# Patient Record
Sex: FEMALE | Race: WHITE | NOT HISPANIC OR LATINO | Employment: OTHER | URBAN - METROPOLITAN AREA
[De-identification: names, ages, dates, MRNs, and addresses within clinical notes are randomized per-mention and may not be internally consistent; named-entity substitution may affect disease eponyms.]

---

## 2017-01-12 ENCOUNTER — OFFICE VISIT (OUTPATIENT)
Dept: CARDIOLOGY | Facility: CLINIC | Age: 72
End: 2017-01-12
Payer: MEDICARE

## 2017-01-12 VITALS
DIASTOLIC BLOOD PRESSURE: 81 MMHG | HEIGHT: 66 IN | BODY MASS INDEX: 34.22 KG/M2 | HEART RATE: 83 BPM | SYSTOLIC BLOOD PRESSURE: 152 MMHG | WEIGHT: 212.94 LBS

## 2017-01-12 DIAGNOSIS — Z78.9 STATIN INTOLERANCE: ICD-10-CM

## 2017-01-12 DIAGNOSIS — E78.5 DYSLIPIDEMIA: ICD-10-CM

## 2017-01-12 DIAGNOSIS — Z95.5 S/P CORONARY ARTERY STENT PLACEMENT: Chronic | ICD-10-CM

## 2017-01-12 DIAGNOSIS — I10 ESSENTIAL HYPERTENSION: ICD-10-CM

## 2017-01-12 DIAGNOSIS — I25.10 CORONARY ARTERY DISEASE INVOLVING NATIVE CORONARY ARTERY OF NATIVE HEART WITHOUT ANGINA PECTORIS: Chronic | ICD-10-CM

## 2017-01-12 DIAGNOSIS — Z95.1 S/P CABG (CORONARY ARTERY BYPASS GRAFT): Primary | ICD-10-CM

## 2017-01-12 DIAGNOSIS — E11.9 TYPE 2 DIABETES MELLITUS WITHOUT COMPLICATION, WITHOUT LONG-TERM CURRENT USE OF INSULIN: ICD-10-CM

## 2017-01-12 DIAGNOSIS — R94.39 ABNORMAL THALLIUM STRESS TEST: ICD-10-CM

## 2017-01-12 PROCEDURE — 99999 PR PBB SHADOW E&M-EST. PATIENT-LVL III: CPT | Mod: PBBFAC,,, | Performed by: INTERNAL MEDICINE

## 2017-01-12 PROCEDURE — 3046F HEMOGLOBIN A1C LEVEL >9.0%: CPT | Mod: S$GLB,,, | Performed by: INTERNAL MEDICINE

## 2017-01-12 PROCEDURE — 3079F DIAST BP 80-89 MM HG: CPT | Mod: S$GLB,,, | Performed by: INTERNAL MEDICINE

## 2017-01-12 PROCEDURE — 3077F SYST BP >= 140 MM HG: CPT | Mod: S$GLB,,, | Performed by: INTERNAL MEDICINE

## 2017-01-12 PROCEDURE — 99214 OFFICE O/P EST MOD 30 MIN: CPT | Mod: S$GLB,,, | Performed by: INTERNAL MEDICINE

## 2017-01-12 PROCEDURE — 1157F ADVNC CARE PLAN IN RCRD: CPT | Mod: S$GLB,,, | Performed by: INTERNAL MEDICINE

## 2017-01-12 PROCEDURE — 2022F DILAT RTA XM EVC RTNOPTHY: CPT | Mod: S$GLB,,, | Performed by: INTERNAL MEDICINE

## 2017-01-12 PROCEDURE — 1159F MED LIST DOCD IN RCRD: CPT | Mod: S$GLB,,, | Performed by: INTERNAL MEDICINE

## 2017-01-12 PROCEDURE — 1126F AMNT PAIN NOTED NONE PRSNT: CPT | Mod: S$GLB,,, | Performed by: INTERNAL MEDICINE

## 2017-01-12 PROCEDURE — 1160F RVW MEDS BY RX/DR IN RCRD: CPT | Mod: S$GLB,,, | Performed by: INTERNAL MEDICINE

## 2017-01-12 PROCEDURE — 4010F ACE/ARB THERAPY RXD/TAKEN: CPT | Mod: S$GLB,,, | Performed by: INTERNAL MEDICINE

## 2017-01-12 RX ORDER — LOSARTAN POTASSIUM 25 MG/1
25 TABLET ORAL EVERY MORNING
Qty: 90 TABLET | Refills: 6 | Status: SHIPPED | OUTPATIENT
Start: 2017-01-12 | End: 2017-04-24 | Stop reason: SDUPTHER

## 2017-01-12 RX ORDER — CARVEDILOL 12.5 MG/1
12.5 TABLET ORAL 2 TIMES DAILY
Qty: 180 TABLET | Refills: 5 | Status: SHIPPED | OUTPATIENT
Start: 2017-01-12 | End: 2017-04-05

## 2017-01-12 RX ORDER — FENOFIBRATE 54 MG/1
54 TABLET ORAL NIGHTLY
Qty: 90 TABLET | Refills: 3 | Status: SHIPPED | OUTPATIENT
Start: 2017-01-12 | End: 2017-04-24

## 2017-01-12 NOTE — MR AVS SNAPSHOT
[place] [facility]  [address]  [facility] [address]  Phone: [phone]                  Mayra Pinedo   2017 3:00 PM   Office Visit    Description:  Female : 1945   Provider:  Jun Thompson MD   Department:  [place] [facility]           Reason for Visit     Follow-up     Hypertension     Coronary Artery Disease           Diagnoses this Visit        Comments    S/P CABG (coronary artery bypass graft)    -  Primary     S/P coronary artery stent placement         Essential hypertension         Dyslipidemia         Coronary artery disease involving native coronary artery of native heart without angina pectoris         Abnormal thallium stress test         S/P coronary artery stent placement     2015 D1- promus 2.5x16    Coronary artery disease involving native coronary artery of native heart without angina pectoris     2015 TUCKER [facility]  LM- 50%, LAD occluded mid. D1-90%, Cx/OM- mild dsx, RCA- occluded. VG- RCA patent LIMA- LAD ??50% at anastomosis VG- OM occluded    Aortic sclerosis         Type 2 diabetes mellitus without complication, without long-term current use of insulin                To Do List           Goals (5 Years of Data)     None      Follow-Up and Disposition     Return in about 10 months (around 2017).       These Medications        Disp Refills Start End    losartan (COZAAR) 25 MG tablet 90 tablet 6 2017     Take 1 tablet (25 mg total) by mouth every morning. - Oral    Pharmacy: [id number] - [place], [place]  [address] Ph #: [phone]       carvedilol (COREG) 12.5 MG tablet 180 tablet 5 2017    Take 1 tablet (12.5 mg total) by mouth 2 (two) times daily. - Oral    Pharmacy: [id number] - [place], [place] - [address] Ph #: [phone]       fenofibrate (TRICOR) 54 MG tablet 90 tablet 3 2017    Take 1 tablet (54 mg total) by mouth nightly. - Oral    Pharmacy: [facility]  [place] - [address]  #: [phone]         [facility]     [facility] - 24/7 Assistance  Registered nurses in the [facility] provide clinical advisement, health education, appointment booking, and other advisory services.  Call for this free service at [phone].             Medications           Message regarding Medications     Verify the changes and/or additions to your medication regime listed below are the same as discussed with your clinician today.  If any of these changes or additions are incorrect, please notify your healthcare provider.        START taking these NEW medications        Refills    fenofibrate (TRICOR) 54 MG tablet 3    Sig: Take 1 tablet (54 mg total) by mouth nightly.    Class: Normal    Route: Oral      CHANGE how you are taking these medications     Start Taking Instead of    losartan (COZAAR) 25 MG tablet losartan (COZAAR) 25 MG tablet    Dosage:  Take 1 tablet (25 mg total) by mouth every morning. Dosage:  Take 1 tablet (25 mg total) by mouth once daily.    Reason for Change:  Reorder     carvedilol (COREG) 12.5 MG tablet carvedilol (COREG) 6.25 MG tablet    Dosage:  Take 1 tablet (12.5 mg total) by mouth 2 (two) times daily. Dosage:  Take 1 tablet (6.25 mg total) by mouth 2 (two) times daily.    Reason for Change:  Reorder       STOP taking these medications     gemfibrozil (LOPID) 600 MG tablet Take 1 tablet (600 mg total) by mouth 2 (two) times daily before meals.           Verify that the below list of medications is an accurate representation of the medications you are currently taking.  If none reported, the list may be blank. If incorrect, please contact your healthcare provider. Carry this list with you in case of emergency.           Current Medications     ALBUTEROL SULFATE (VENTOLIN HFA INHL) Inhale 2 Inhalers into the lungs as needed.     aspirin (ECOTRIN) 81 MG EC tablet Take 81 mg by mouth once daily.   "    carvedilol (COREG) 12.5 MG tablet Take 1 tablet (12.5 mg total) by mouth 2 (two) times daily.    cetirizine (ZYRTEC) 10 MG tablet Take 1 tablet by mouth.    clopidogrel (PLAVIX) 75 mg tablet Take 1 tablet (75 mg total) by mouth once.    cyclobenzaprine (FLEXERIL) 10 MG tablet Take 10 mg by mouth 3 (three) times daily as needed for Muscle spasms.    isosorbide mononitrate (IMDUR) 60 MG 24 hr tablet Take 1 tablet (60 mg total) by mouth every evening.    losartan (COZAAR) 25 MG tablet Take 1 tablet (25 mg total) by mouth every morning.    meloxicam (MOBIC) 7.5 MG tablet Take 15 mg by mouth once daily.     metformin (GLUCOPHAGE-XR) 500 MG 24 hr tablet Take 1 tablet (500 mg total) by mouth once daily.    nitroGLYCERIN (NITROSTAT) 0.4 MG SL tablet Place 1 tablet (0.4 mg total) under the tongue every 5 (five) minutes as needed for Chest pain.    omega-3 fatty acids-vitamin E (FISH OIL) 1,000 mg Cap Take 2,400 mg by mouth 2 (two) times daily.     pantoprazole (PROTONIX) 40 MG tablet Take 40 mg by mouth once daily.    pitavastatin (LIVALO) 4 mg Tab Take 4 mg by mouth once daily.    fenofibrate (TRICOR) 54 MG tablet Take 1 tablet (54 mg total) by mouth nightly.           Clinical Reference Information           Vital Signs - Last Recorded  Most recent update: 1/12/2017  3:20 PM by Taylor Huffman    BP Pulse Ht Wt BMI    (!) 152/81 (BP Location: Left arm, Patient Position: Sitting, BP Method: Automatic) 83 5' 6" (1.676 m) 96.6 kg (212 lb 15.4 oz) 34.37 kg/m2      Blood Pressure          Most Recent Value    BP  (!)  152/81      Allergies as of 1/12/2017     Oxycodone-acetaminophen    Digitalis Glycosides    Statins-hmg-coa Reductase Inhibitors    Erythromycin    Latex, Natural Rubber    Penicillins    Sulfa (Sulfonamide Antibiotics)      Immunizations Administered on Date of Encounter - 1/12/2017     None      Orders Placed During Today's Visit     Future Labs/Procedures Expected by Expires    2D echo with color flow " doppler  11/12/2017 1/13/2018    CAR Ultrasound doppler carotid bliateral  11/12/2017 1/12/2018    CBC auto differential  11/12/2017 3/13/2018    CK  11/12/2017 1/13/2018    Comprehensive metabolic panel  11/12/2017 1/13/2018    Lipid panel  11/12/2017 1/13/2018

## 2017-01-12 NOTE — PROGRESS NOTES
Subjective:    Patient ID:  Mayra Pinedo is a 71 y.o. female who presents for follow-up of Follow-up (stent 07/08/2016); Hypertension; and Coronary Artery Disease      HPI   Here for follow up of CABG-PCI 7/16. No angina. 2 episodes of palpitation occurring while on vacation lasting < 4 hrs. None since then.       Review of Systems   Constitution: Negative for malaise/fatigue.   Eyes: Negative for blurred vision.   Cardiovascular: Negative for chest pain, claudication, cyanosis, dyspnea on exertion, irregular heartbeat, leg swelling, near-syncope, orthopnea, palpitations, paroxysmal nocturnal dyspnea and syncope.   Respiratory: Negative for cough and shortness of breath.    Hematologic/Lymphatic: Does not bruise/bleed easily.   Musculoskeletal: Negative for back pain, falls, joint pain, muscle cramps, muscle weakness and myalgias.   Gastrointestinal: Negative for abdominal pain, change in bowel habit, nausea and vomiting.   Genitourinary: Negative for urgency.   Neurological: Negative for dizziness, focal weakness and light-headedness.        Objective:    Physical Exam   Constitutional: She is oriented to person, place, and time. She appears well-developed and well-nourished.   HENT:   Head: Normocephalic.   Eyes: Conjunctivae are normal.   Neck: Normal range of motion. Neck supple. No JVD present.   Cardiovascular: Normal rate, regular rhythm, normal heart sounds and intact distal pulses.    Pulses:       Carotid pulses are 2+ on the right side, and 2+ on the left side.       Radial pulses are 2+ on the right side, and 2+ on the left side.        Dorsalis pedis pulses are 2+ on the right side, and 2+ on the left side.        Posterior tibial pulses are 2+ on the right side, and 2+ on the left side.   Well healed midline sternal incision.     Pulmonary/Chest: Effort normal and breath sounds normal.   Abdominal: Soft. Bowel sounds are normal.   Musculoskeletal: She exhibits no edema or tenderness.    Neurological: She is alert and oriented to person, place, and time. Gait normal.   Skin: Skin is warm, dry and intact. No cyanosis. Nails show no clubbing.   Psychiatric: She has a normal mood and affect. Her speech is normal and behavior is normal. Thought content normal.             ..    Chemistry        Component Value Date/Time     07/08/2016 0652    K 4.1 07/08/2016 0652     07/08/2016 0652    CO2 26 07/08/2016 0652    BUN 14 07/08/2016 0652    CREATININE 0.67 07/08/2016 0652     (H) 07/08/2016 0652        Component Value Date/Time    CALCIUM 9.4 07/08/2016 0652    ALKPHOS 57 06/21/2016 0843    AST 22 06/21/2016 0843    ALT 24 06/21/2016 0843    BILITOT 0.6 06/21/2016 0843            ..  Lab Results   Component Value Date    CHOL 134 06/21/2016     Lab Results   Component Value Date    HDL 34 (L) 06/21/2016     Lab Results   Component Value Date    LDLCALC 59.8 (L) 06/21/2016     Lab Results   Component Value Date    TRIG 201 (H) 06/21/2016     Lab Results   Component Value Date    CHOLHDL 25.4 06/21/2016     ..  Lab Results   Component Value Date    WBC 3.89 (L) 07/08/2016    HGB 12.3 07/08/2016    HCT 36.3 (L) 07/08/2016    MCV 93 07/08/2016     07/08/2016 12/2016   HDL 45 LDL 43  CMP- NL    Test(s) Reviewed  I have reviewed the following in detail:  [] Stress test   [] Angiography   [x] Echocardiogram   [x] Labs   [x] Other:       Assessment:       1. S/P CABG (coronary artery bypass graft) x 3 1991    2. S/P coronary artery stent placement    3. Essential hypertension    4. Dyslipidemia    5. Coronary artery disease involving native coronary artery of native heart without angina pectoris    6. Abnormal thallium stress test         Plan:       Return to clinic 8 months   Aerobic exercise 5x's/wk. Heart healthy diet and risk factor modification.    See labs and med orders.  INCREASE COREG

## 2017-02-20 ENCOUNTER — TELEPHONE (OUTPATIENT)
Dept: CARDIOLOGY | Facility: CLINIC | Age: 72
End: 2017-02-20

## 2017-02-20 NOTE — TELEPHONE ENCOUNTER
----- Message from Ashley Zaldivar sent at 2/20/2017  9:33 AM CST -----  Contact: self  Patient called stating she was seen in the ER at [facility] over the week end for AFIB   She was told to follow up with her cardio   Nothing showing until 4/16/17  Please call her at  her cell to schedule or advise.     Thanks

## 2017-03-02 ENCOUNTER — TELEPHONE (OUTPATIENT)
Dept: CARDIOLOGY | Facility: CLINIC | Age: 72
End: 2017-03-02

## 2017-03-02 ENCOUNTER — PATIENT MESSAGE (OUTPATIENT)
Dept: CARDIOLOGY | Facility: CLINIC | Age: 72
End: 2017-03-02

## 2017-03-02 NOTE — TELEPHONE ENCOUNTER
Dr Thompson,    email below from Patient. Do you want to re-adjust her Coreg dosage? Please advise    Ever since I have increased the Coreg after my SVT/A-Fib, and going to [facility],  I have been extremely dizzy when I wake up in the morning. It usually gets better as the day goes on, but a few times it has lasted all day. Earlier this week I tried cutting back to the 12.5 ml to see if that would help, it did for a couple of days. But today I am just as dizzy as when taking 25 ml.     Could the Feniofibrate make me dizzy, as that is new to my medications, too.? I take it as prescribed.     This is very uncomfortable to wake up that way, hope I don't fall, and when it stays with me I feel so limited in what I am able to do.

## 2017-04-05 ENCOUNTER — OFFICE VISIT (OUTPATIENT)
Dept: CARDIOLOGY | Facility: CLINIC | Age: 72
End: 2017-04-05
Payer: MEDICARE

## 2017-04-05 ENCOUNTER — TELEPHONE (OUTPATIENT)
Dept: CARDIOLOGY | Facility: CLINIC | Age: 72
End: 2017-04-05

## 2017-04-05 ENCOUNTER — TELEPHONE (OUTPATIENT)
Dept: ELECTROPHYSIOLOGY | Facility: CLINIC | Age: 72
End: 2017-04-05

## 2017-04-05 VITALS
SYSTOLIC BLOOD PRESSURE: 134 MMHG | DIASTOLIC BLOOD PRESSURE: 72 MMHG | HEIGHT: 66 IN | WEIGHT: 212.94 LBS | BODY MASS INDEX: 34.22 KG/M2 | HEART RATE: 66 BPM

## 2017-04-05 DIAGNOSIS — I25.10 CORONARY ARTERY DISEASE INVOLVING NATIVE CORONARY ARTERY OF NATIVE HEART WITHOUT ANGINA PECTORIS: Chronic | ICD-10-CM

## 2017-04-05 DIAGNOSIS — Z95.5 S/P CORONARY ARTERY STENT PLACEMENT: Primary | Chronic | ICD-10-CM

## 2017-04-05 DIAGNOSIS — I10 ESSENTIAL HYPERTENSION: ICD-10-CM

## 2017-04-05 DIAGNOSIS — Z95.1 S/P CABG (CORONARY ARTERY BYPASS GRAFT): ICD-10-CM

## 2017-04-05 DIAGNOSIS — E78.5 DYSLIPIDEMIA: ICD-10-CM

## 2017-04-05 PROCEDURE — 1160F RVW MEDS BY RX/DR IN RCRD: CPT | Mod: S$GLB,,, | Performed by: INTERNAL MEDICINE

## 2017-04-05 PROCEDURE — 99999 PR PBB SHADOW E&M-EST. PATIENT-LVL III: CPT | Mod: PBBFAC,,, | Performed by: INTERNAL MEDICINE

## 2017-04-05 PROCEDURE — 3078F DIAST BP <80 MM HG: CPT | Mod: S$GLB,,, | Performed by: INTERNAL MEDICINE

## 2017-04-05 PROCEDURE — 1126F AMNT PAIN NOTED NONE PRSNT: CPT | Mod: S$GLB,,, | Performed by: INTERNAL MEDICINE

## 2017-04-05 PROCEDURE — 1157F ADVNC CARE PLAN IN RCRD: CPT | Mod: S$GLB,,, | Performed by: INTERNAL MEDICINE

## 2017-04-05 PROCEDURE — 99213 OFFICE O/P EST LOW 20 MIN: CPT | Mod: S$GLB,,, | Performed by: INTERNAL MEDICINE

## 2017-04-05 PROCEDURE — 1159F MED LIST DOCD IN RCRD: CPT | Mod: S$GLB,,, | Performed by: INTERNAL MEDICINE

## 2017-04-05 PROCEDURE — 3075F SYST BP GE 130 - 139MM HG: CPT | Mod: S$GLB,,, | Performed by: INTERNAL MEDICINE

## 2017-04-05 RX ORDER — CARVEDILOL 25 MG/1
25 TABLET ORAL 2 TIMES DAILY
Qty: 180 TABLET | Refills: 5 | Status: SHIPPED | OUTPATIENT
Start: 2017-04-05 | End: 2018-04-12 | Stop reason: SDUPTHER

## 2017-04-05 NOTE — PROGRESS NOTES
Subjective:    Patient ID:  Mayra Pinedo is a 71 y.o. female who presents for follow-up of No chief complaint on file.      HPI   Here for follow up of CABG- PCI (NICOLE 7/16). Was seen in ED for PSVT assoc with dizziness (has has 3 episodes similar in past 2 years). Doing well since then. Patients states is doing well no chest pain, SOB or change in exertional tolerence. Patient does not exercise but remains very active with out change in exertional tolerance or chest pain.     Review of Systems   Constitution: Negative for chills, decreased appetite, weakness and night sweats.   HENT: Negative for headaches and nosebleeds.    Eyes: Negative for blurred vision and visual disturbance.   Cardiovascular: Negative for chest pain, claudication, cyanosis, dyspnea on exertion, irregular heartbeat, leg swelling, near-syncope, orthopnea, palpitations, paroxysmal nocturnal dyspnea and syncope.   Respiratory: Negative for cough and shortness of breath.    Endocrine: Negative for polyuria.   Hematologic/Lymphatic: Does not bruise/bleed easily.   Skin: Negative for flushing and rash.   Musculoskeletal: Negative for arthritis, joint pain, muscle cramps and myalgias.   Gastrointestinal: Negative for abdominal pain, change in bowel habit and heartburn.   Genitourinary: Negative for bladder incontinence.   Neurological: Negative for dizziness, light-headedness and loss of balance.   Psychiatric/Behavioral: Negative for altered mental status.        Objective:    Physical Exam   Constitutional: She is oriented to person, place, and time. She appears well-developed and well-nourished.   HENT:   Head: Normocephalic.   Eyes: Conjunctivae are normal.   Neck: Normal range of motion. Neck supple. No JVD present.   Cardiovascular: Normal rate, regular rhythm, normal heart sounds and intact distal pulses.    Pulses:       Carotid pulses are 2+ on the right side, and 2+ on the left side.       Radial pulses are 2+ on the right side, and 2+  on the left side.        Dorsalis pedis pulses are 2+ on the right side, and 2+ on the left side.        Posterior tibial pulses are 2+ on the right side, and 2+ on the left side.   Well healed midline sternal incision.     Pulmonary/Chest: Effort normal and breath sounds normal.   Abdominal: Soft. Bowel sounds are normal.   Musculoskeletal: She exhibits no edema or tenderness.   Neurological: She is alert and oriented to person, place, and time. Gait normal.   Skin: Skin is warm, dry and intact. No cyanosis. Nails show no clubbing.   Psychiatric: She has a normal mood and affect. Her speech is normal and behavior is normal. Thought content normal.             ..    Chemistry        Component Value Date/Time     02/18/2017 1439    K 4.1 02/18/2017 1439     02/18/2017 1439    CO2 24 02/18/2017 1439    BUN 15 02/18/2017 1439    CREATININE 0.85 02/18/2017 1439     (H) 02/18/2017 1439        Component Value Date/Time    CALCIUM 9.6 02/18/2017 1439    ALKPHOS 55 02/18/2017 1439    AST 28 02/18/2017 1439    ALT 41 02/18/2017 1439    BILITOT 0.6 02/18/2017 1439            ..  Lab Results   Component Value Date    CHOL 134 06/21/2016     Lab Results   Component Value Date    HDL 34 (L) 06/21/2016     Lab Results   Component Value Date    LDLCALC 59.8 (L) 06/21/2016     Lab Results   Component Value Date    TRIG 201 (H) 06/21/2016     Lab Results   Component Value Date    CHOLHDL 25.4 06/21/2016     ..  Lab Results   Component Value Date    WBC 6.84 02/18/2017    HGB 13.5 02/18/2017    HCT 39.9 02/18/2017    MCV 93 02/18/2017     02/18/2017       Test(s) Reviewed  I have reviewed the following in detail:  [] Stress test   [] Angiography   [] Echocardiogram   [x] Labs   [x] Other:       Assessment:         ICD-10-CM ICD-9-CM   1. S/P coronary artery stent placement Z95.5 V45.82   2. S/P CABG (coronary artery bypass graft) x 3 1991 Z95.1 V45.81   3. Dyslipidemia E78.5 272.4   4. Essential hypertension  I10 401.9   5. Coronary artery disease involving native coronary artery of native heart without angina pectoris I25.10 414.01     Problem List Items Addressed This Visit     Coronary artery disease involving native coronary artery of native heart without angina pectoris (Chronic)    Dyslipidemia    Essential hypertension    S/P CABG (coronary artery bypass graft) x 3 1991    Overview     S/P CABG  x 3 1991 LIMA-LAD, VG-D (occluded), VG-RCA         S/P coronary artery stent placement - Primary (Chronic)    Overview     7/2016 PDA- 2.25 x 24, synergy    8/15 lad/diag promus 2.5x16    8/2009 vg-rca- promus 3.5x8                Plan:           Return to clinic 6 months   Aerobic exercise 5x's/wk. Heart healthy diet and risk factor modification.    See labs and med orders.  Refer to EP due to PSVT with sx's.

## 2017-04-05 NOTE — MR AVS SNAPSHOT
[facility]  [address]  [place] [address]  Phone: [phone]                  Mayra Pinedo   2017 10:00 AM   Office Visit    Description:  Female : 1945   Provider:  Jun Thompson MD   Department:  [place] - Cardiology           Reason for Visit     PSVT           Diagnoses this Visit        Comments    S/P coronary artery stent placement    -  Primary     S/P CABG (coronary artery bypass graft)         Dyslipidemia         Essential hypertension         Coronary artery disease involving native coronary artery of native heart without angina pectoris                To Do List           Goals (5 Years of Data)     None      Follow-Up and Disposition     Return in about 6 months (around 10/5/2017).       These Medications        Disp Refills Start End    carvedilol (COREG) 25 MG tablet 180 tablet 5 2017    Take 1 tablet (25 mg total) by mouth 2 (two) times daily. - Oral    Pharmacy: [facility] [address] [place], [place] [address]  #: [phone]         [facility]     [facility] -  Assistance  Unless otherwise directed by your provider, please contact [facility], our nurse care line that is available for  assistance.     Registered nurses in the [facility] provide: appointment scheduling, clinical advisement, health education, and other advisory services.  Call: [phone] (toll free)               Medications           Message regarding Medications     Verify the changes and/or additions to your medication regime listed below are the same as discussed with your clinician today.  If any of these changes or additions are incorrect, please notify your healthcare provider.             Verify that the below list of medications is an accurate representation of the medications you are currently taking.  If none reported, the list may be blank. If incorrect, please contact your healthcare  "provider. Carry this list with you in case of emergency.           Current Medications     ALBUTEROL SULFATE (VENTOLIN HFA INHL) Inhale 2 Inhalers into the lungs as needed.     aspirin (ECOTRIN) 81 MG EC tablet Take 81 mg by mouth once daily.      carvedilol (COREG) 25 MG tablet Take 1 tablet (25 mg total) by mouth 2 (two) times daily.    cetirizine (ZYRTEC) 10 MG tablet Take 1 tablet by mouth.    clopidogrel (PLAVIX) 75 mg tablet Take 1 tablet (75 mg total) by mouth once.    cyclobenzaprine (FLEXERIL) 10 MG tablet Take 10 mg by mouth 3 (three) times daily as needed for Muscle spasms.    fenofibrate (TRICOR) 54 MG tablet Take 1 tablet (54 mg total) by mouth nightly.    isosorbide mononitrate (IMDUR) 60 MG 24 hr tablet Take 1 tablet (60 mg total) by mouth every evening.    losartan (COZAAR) 25 MG tablet Take 1 tablet (25 mg total) by mouth every morning.    meloxicam (MOBIC) 7.5 MG tablet Take 15 mg by mouth once daily.     metformin (GLUCOPHAGE-XR) 500 MG 24 hr tablet Take 1 tablet (500 mg total) by mouth once daily.    nitroGLYCERIN (NITROSTAT) 0.4 MG SL tablet Place 1 tablet (0.4 mg total) under the tongue every 5 (five) minutes as needed for Chest pain.    omega-3 fatty acids-vitamin E (FISH OIL) 1,000 mg Cap Take 2,400 mg by mouth 2 (two) times daily.     pantoprazole (PROTONIX) 40 MG tablet Take 40 mg by mouth once daily.    pitavastatin (LIVALO) 4 mg Tab Take 4 mg by mouth once daily.           Clinical Reference Information           Your Vitals Were     BP Pulse Height Weight BMI    134/72 (BP Location: Right arm, Patient Position: Sitting, BP Method: Automatic) 66 5' 6" (1.676 m) 96.6 kg (212 lb 15.4 oz) 34.37 kg/m2      Blood Pressure          Most Recent Value    BP  134/72      Allergies as of 4/5/2017     Oxycodone-acetaminophen    Digitalis Glycosides    Statins-hmg-coa Reductase Inhibitors    Erythromycin    Latex, Natural Rubber    Penicillins    Sulfa (Sulfonamide Antibiotics)      Immunizations " Administered on Date of Encounter - 4/5/2017     None      Orders Placed During Today's Visit     Future Labs/Procedures Expected by Expires    Ambulatory Referral to Electrophysiology  11/5/2017 4/5/2018    CBC auto differential  11/5/2017 6/4/2018    CK  11/5/2017 4/6/2018    Comprehensive metabolic panel  11/5/2017 4/6/2018    Lipid panel  11/5/2017 4/6/2018      Language Assistance Services     ATTENTION: Language assistance services are available, free of charge. Please call [phone].      ATENCIÓN: Si habla español, tiene a barreto disposición servicios gratuitos de asistencia lingüística. Llame al [phone].     CHÚ Ý: N?u b?n nói Ti?ng Vi?t, có các d?ch v? h? tr? ngôn ng? mi?n phí dành cho b?n. G?i s? [phone].         [facility] complies with applicable Federal civil rights laws and does not discriminate on the basis of race, color, national origin, age, disability, or sex.

## 2017-04-05 NOTE — TELEPHONE ENCOUNTER
Dr Thompson put in a referral for pt to see Dr [place]. i am unable to schedule. Please call pt to schedule.   Thank you

## 2017-04-14 RX ORDER — PITAVASTATIN CALCIUM 4.18 MG/1
TABLET, FILM COATED ORAL
Qty: 90 TABLET | Refills: 0 | Status: SHIPPED | OUTPATIENT
Start: 2017-04-14 | End: 2017-07-20 | Stop reason: SDUPTHER

## 2017-04-24 ENCOUNTER — INITIAL CONSULT (OUTPATIENT)
Dept: CARDIOLOGY | Facility: CLINIC | Age: 72
End: 2017-04-24
Payer: MEDICARE

## 2017-04-24 VITALS
WEIGHT: 215.63 LBS | HEIGHT: 66 IN | HEART RATE: 59 BPM | SYSTOLIC BLOOD PRESSURE: 160 MMHG | DIASTOLIC BLOOD PRESSURE: 74 MMHG | BODY MASS INDEX: 34.65 KG/M2

## 2017-04-24 DIAGNOSIS — Z95.1 S/P CABG (CORONARY ARTERY BYPASS GRAFT): ICD-10-CM

## 2017-04-24 DIAGNOSIS — I47.10 SVT (SUPRAVENTRICULAR TACHYCARDIA): ICD-10-CM

## 2017-04-24 DIAGNOSIS — I25.10 CORONARY ARTERY DISEASE INVOLVING NATIVE CORONARY ARTERY OF NATIVE HEART WITHOUT ANGINA PECTORIS: Chronic | ICD-10-CM

## 2017-04-24 DIAGNOSIS — I10 ESSENTIAL HYPERTENSION: ICD-10-CM

## 2017-04-24 DIAGNOSIS — Z95.5 S/P CORONARY ARTERY STENT PLACEMENT: Chronic | ICD-10-CM

## 2017-04-24 DIAGNOSIS — E78.5 DYSLIPIDEMIA: ICD-10-CM

## 2017-04-24 DIAGNOSIS — E11.9 TYPE 2 DIABETES MELLITUS WITHOUT COMPLICATION, WITHOUT LONG-TERM CURRENT USE OF INSULIN: Primary | ICD-10-CM

## 2017-04-24 PROCEDURE — 93000 ELECTROCARDIOGRAM COMPLETE: CPT | Mod: S$GLB,,, | Performed by: INTERNAL MEDICINE

## 2017-04-24 PROCEDURE — 4010F ACE/ARB THERAPY RXD/TAKEN: CPT | Mod: S$GLB,,, | Performed by: INTERNAL MEDICINE

## 2017-04-24 PROCEDURE — 1126F AMNT PAIN NOTED NONE PRSNT: CPT | Mod: S$GLB,,, | Performed by: INTERNAL MEDICINE

## 2017-04-24 PROCEDURE — 99999 PR PBB SHADOW E&M-EST. PATIENT-LVL III: CPT | Mod: PBBFAC,,, | Performed by: INTERNAL MEDICINE

## 2017-04-24 PROCEDURE — 3078F DIAST BP <80 MM HG: CPT | Mod: S$GLB,,, | Performed by: INTERNAL MEDICINE

## 2017-04-24 PROCEDURE — 1159F MED LIST DOCD IN RCRD: CPT | Mod: S$GLB,,, | Performed by: INTERNAL MEDICINE

## 2017-04-24 PROCEDURE — 1160F RVW MEDS BY RX/DR IN RCRD: CPT | Mod: S$GLB,,, | Performed by: INTERNAL MEDICINE

## 2017-04-24 PROCEDURE — 3077F SYST BP >= 140 MM HG: CPT | Mod: S$GLB,,, | Performed by: INTERNAL MEDICINE

## 2017-04-24 PROCEDURE — 99204 OFFICE O/P NEW MOD 45 MIN: CPT | Mod: S$GLB,,, | Performed by: INTERNAL MEDICINE

## 2017-04-24 RX ORDER — LOSARTAN POTASSIUM 25 MG/1
25 TABLET ORAL EVERY MORNING
Qty: 90 TABLET | Refills: 6 | Status: SHIPPED | OUTPATIENT
Start: 2017-04-24 | End: 2018-07-05 | Stop reason: SDUPTHER

## 2017-04-24 NOTE — PROGRESS NOTES
Subjective:    Patient ID:  Mayra Pinedo is a 71 y.o. female who presents for evaluation of Tachycardia (Ref by Dr. Thompson )      HPI 70 yo female with SVT, CAD s/p CABG + PCI, Htn, DM, obesity.  Primary cardiologist is Dr. Thompson.  Has noted palpitations over the past 5 years.  Can usually terminate them with deep breathing and bearing down.  Presented 2/18/17 with sustained palpitations, with chest pressure, unable to terminate.  ECG revealed SVT at 160 bpm.  IV Lopressor >> conversion to nsr.  Had another episode yesterday.  Occurring once every couple of months.  Denies syncope.  PCI to SVG >> RCA 7/8/16  ECG reveals nsr without evidence of pre-excitation.      Review of Systems   Constitution: Negative. Negative for weakness and malaise/fatigue.   Cardiovascular: Positive for chest pain and palpitations. Negative for dyspnea on exertion, irregular heartbeat, leg swelling, near-syncope, orthopnea, paroxysmal nocturnal dyspnea and syncope.   Respiratory: Negative for cough and shortness of breath.    Neurological: Negative for dizziness and light-headedness.   All other systems reviewed and are negative.       Objective:    Physical Exam   Constitutional: She is oriented to person, place, and time. She appears well-developed and well-nourished.   Eyes: Conjunctivae are normal. No scleral icterus.   Neck: No JVD present. No tracheal deviation present.   Cardiovascular: Normal rate and regular rhythm.  PMI is not displaced.    Pulmonary/Chest: Effort normal and breath sounds normal. No respiratory distress.   Abdominal: Soft. There is no hepatosplenomegaly. There is no tenderness.   Musculoskeletal: She exhibits no edema or tenderness.   Neurological: She is alert and oriented to person, place, and time.   Skin: Skin is warm and dry. No rash noted.   Psychiatric: She has a normal mood and affect. Her behavior is normal.         Assessment:       1. Type 2 diabetes mellitus without complication, without long-term  current use of insulin    2. S/P coronary artery stent placement    3. S/P CABG (coronary artery bypass graft) x 3 1991    4. Dyslipidemia    5. Essential hypertension    6. Coronary artery disease involving native coronary artery of native heart without angina pectoris    7. SVT (supraventricular tachycardia)         Plan:       Recurrent SVT.  Recommend RFA.  Risks and benefits of RFA discussed, she would like to proceed.  Anesthesia (prefer MAC).  Hold Coreg 2 days prior.   Increase Cozaar back to 25 mg daily.

## 2017-04-24 NOTE — LETTER
April 24, 2017      Jun Thompson MD  [address] [facility] [address] [place] [address]           [facility]  [address] [facility] [address] [place] [address]  Phone: [phone]          Patient: Mayra Pinedo   MR Number: [id number]   YOB: 1945   Date of Visit: 4/24/2017       Dear Dr. Jun Thompson:    Thank you for referring Mayra Pinedo to me for evaluation. Attached you will find relevant portions of my assessment and plan of care.    If you have questions, please do not hesitate to call me. I look forward to following Mayra Pinedo along with you.    Sincerely,    Emmanuel Colorado MD    Enclosure  CC:  No Recipients    If you would like to receive this communication electronically, please contact [email or web address] or [phone] to request more information on [employer] Link access.    For providers and/or their staff who would like to refer a patient to [facility], please contact us through our one-stop-shop provider referral line, [facility], at [phone].    If you feel you have received this communication in error or would no longer like to receive these types of communications, please e-mail [email or web address]

## 2017-04-25 DIAGNOSIS — I47.10 SVT (SUPRAVENTRICULAR TACHYCARDIA): Primary | ICD-10-CM

## 2017-05-07 ENCOUNTER — TELEPHONE (OUTPATIENT)
Dept: ELECTROPHYSIOLOGY | Facility: CLINIC | Age: 72
End: 2017-05-07

## 2017-05-07 NOTE — TELEPHONE ENCOUNTER
ABLATION EDUCATION CHECKLIST    6/06/17 - Lab Work   Pre-procedure labs have been ordered for you @ [facility].  [address] [place] [address].  Orders have already been faxed to this facility.   You do not have to fast for this lab work!    6/13/17 @ 11 AM - [facility]blation (arrival time)  Report to Cardiology Waiting Room on 3rd floor of the Hospital    (Do not report to clinic)  Directions for Reporting to Cardiology Waiting Area in the Hospital  If you park in the Parking Garage:  Take elevators to the 2nd floor  Walk up ramp and turn right by Gold Elevators  Take elevator to the 3rd floor  Upon exiting the elevator, turn away from the clinic areas  Walk long martinez around to front of hospital to area with windows overlooking [facility]  Check in at Reception Desk  OR  If family is dropping you off:  Have them drop you off at the front of the Hospital  (Near the ER, where all the flags are hung).  Take the E elevators to the 3rd floor.  Check in at the Reception Desk in the waiting room.    Do not eat or drink anything after: 12 mn on the night before your procedure    Medications:   HOLD carvedilol (Coreg) two (2) days prior to your procedure. YOUR LAST DOSE: Saturday, Lulu 10, 2017.   HOLD your blood sugar pill on the morning of your procedure - metformin (Glucophage).   You may take your OTHER usual morning medications with a sip of water    You will be spending the night after your procedure  You will need someone to drive you home the day after your procedure.    Your pain during your procedure will be managed by the anesthesia team.     THE ABOVE INSTRUCTIONS WERE GIVEN TO THE PATIENT VERBALLY AND THEY VERBALIZED UNDERSTANDING.  THEY DO NOT REQUIRE ANY SPECIAL NEEDS AND DO NOT HAVE ANY LEARNING BARRIERS.    Any need to reschedule or cancel procedures, or any questions regarding your procedures should be addressed directly with the Arrhythmia Department Nurses at the following phone number:  [phone]

## 2017-05-18 ENCOUNTER — PATIENT MESSAGE (OUTPATIENT)
Dept: ELECTROPHYSIOLOGY | Facility: CLINIC | Age: 72
End: 2017-05-18

## 2017-05-18 DIAGNOSIS — I47.10 SVT (SUPRAVENTRICULAR TACHYCARDIA): Primary | ICD-10-CM

## 2017-06-13 ENCOUNTER — SURGERY (OUTPATIENT)
Age: 72
End: 2017-06-13

## 2017-06-13 ENCOUNTER — HOSPITAL ENCOUNTER (OUTPATIENT)
Facility: HOSPITAL | Age: 72
Discharge: HOME | End: 2017-06-14
Attending: INTERNAL MEDICINE | Admitting: INTERNAL MEDICINE
Payer: MEDICARE

## 2017-06-13 ENCOUNTER — ANESTHESIA EVENT (OUTPATIENT)
Dept: MEDSURG UNIT | Facility: HOSPITAL | Age: 72
End: 2017-06-13
Payer: MEDICARE

## 2017-06-13 ENCOUNTER — ANESTHESIA (OUTPATIENT)
Dept: MEDSURG UNIT | Facility: HOSPITAL | Age: 72
End: 2017-06-13
Payer: MEDICARE

## 2017-06-13 DIAGNOSIS — I47.10 SVT (SUPRAVENTRICULAR TACHYCARDIA): ICD-10-CM

## 2017-06-13 LAB
POCT GLUCOSE: 104 MG/DL (ref 70–110)
POCT GLUCOSE: 113 MG/DL (ref 70–110)
POCT GLUCOSE: 149 MG/DL (ref 70–110)
POCT GLUCOSE: 95 MG/DL (ref 70–110)

## 2017-06-13 PROCEDURE — 25000003 PHARM REV CODE 250: Performed by: NURSE ANESTHETIST, CERTIFIED REGISTERED

## 2017-06-13 PROCEDURE — 93621 COMP EP EVL L PAC&REC C SINS: CPT | Mod: 26,,, | Performed by: INTERNAL MEDICINE

## 2017-06-13 PROCEDURE — 93010 ELECTROCARDIOGRAM REPORT: CPT | Mod: S$GLB,,, | Performed by: INTERNAL MEDICINE

## 2017-06-13 PROCEDURE — 93005 ELECTROCARDIOGRAM TRACING: CPT | Mod: 59

## 2017-06-13 PROCEDURE — 93623 PRGRMD STIMJ&PACG IV RX NFS: CPT | Mod: 26,,, | Performed by: INTERNAL MEDICINE

## 2017-06-13 PROCEDURE — 82962 GLUCOSE BLOOD TEST: CPT

## 2017-06-13 PROCEDURE — 82962 GLUCOSE BLOOD TEST: CPT | Mod: 91 | Performed by: INTERNAL MEDICINE

## 2017-06-13 PROCEDURE — 37000008 HC ANESTHESIA 1ST 15 MINUTES: Performed by: INTERNAL MEDICINE

## 2017-06-13 PROCEDURE — 93613 INTRACARDIAC EPHYS 3D MAPG: CPT | Mod: ,,, | Performed by: INTERNAL MEDICINE

## 2017-06-13 PROCEDURE — 63600175 PHARM REV CODE 636 W HCPCS: Performed by: NURSE ANESTHETIST, CERTIFIED REGISTERED

## 2017-06-13 PROCEDURE — D9220A PRA ANESTHESIA: Mod: CRNA,,, | Performed by: NURSE ANESTHETIST, CERTIFIED REGISTERED

## 2017-06-13 PROCEDURE — 25000003 PHARM REV CODE 250: Performed by: INTERNAL MEDICINE

## 2017-06-13 PROCEDURE — 25000003 PHARM REV CODE 250

## 2017-06-13 PROCEDURE — D9220A PRA ANESTHESIA: Mod: ANES,,, | Performed by: ANESTHESIOLOGY

## 2017-06-13 PROCEDURE — 37000009 HC ANESTHESIA EA ADD 15 MINS: Performed by: INTERNAL MEDICINE

## 2017-06-13 PROCEDURE — 93653 COMPRE EP EVAL TX SVT: CPT

## 2017-06-13 PROCEDURE — 93653 COMPRE EP EVAL TX SVT: CPT | Mod: ,,, | Performed by: INTERNAL MEDICINE

## 2017-06-13 RX ORDER — PROPOFOL 10 MG/ML
VIAL (ML) INTRAVENOUS
Status: DISCONTINUED | OUTPATIENT
Start: 2017-06-13 | End: 2017-06-13

## 2017-06-13 RX ORDER — ISOSORBIDE MONONITRATE 60 MG/1
60 TABLET, EXTENDED RELEASE ORAL DAILY
Status: DISCONTINUED | OUTPATIENT
Start: 2017-06-14 | End: 2017-06-14 | Stop reason: HOSPADM

## 2017-06-13 RX ORDER — ASPIRIN 81 MG/1
81 TABLET ORAL DAILY
Status: DISCONTINUED | OUTPATIENT
Start: 2017-06-14 | End: 2017-06-14 | Stop reason: HOSPADM

## 2017-06-13 RX ORDER — PHENYLEPHRINE HYDROCHLORIDE 10 MG/ML
INJECTION INTRAVENOUS
Status: DISCONTINUED | OUTPATIENT
Start: 2017-06-13 | End: 2017-06-13

## 2017-06-13 RX ORDER — LIDOCAINE HCL/PF 100 MG/5ML
SYRINGE (ML) INTRAVENOUS
Status: DISCONTINUED | OUTPATIENT
Start: 2017-06-13 | End: 2017-06-13

## 2017-06-13 RX ORDER — CARVEDILOL 25 MG/1
25 TABLET ORAL 2 TIMES DAILY
Status: DISCONTINUED | OUTPATIENT
Start: 2017-06-13 | End: 2017-06-14 | Stop reason: HOSPADM

## 2017-06-13 RX ORDER — ONDANSETRON 2 MG/ML
INJECTION INTRAMUSCULAR; INTRAVENOUS
Status: DISCONTINUED | OUTPATIENT
Start: 2017-06-13 | End: 2017-06-13

## 2017-06-13 RX ORDER — PANTOPRAZOLE SODIUM 40 MG/1
40 TABLET, DELAYED RELEASE ORAL DAILY
Status: DISCONTINUED | OUTPATIENT
Start: 2017-06-14 | End: 2017-06-14 | Stop reason: HOSPADM

## 2017-06-13 RX ORDER — LOSARTAN POTASSIUM 25 MG/1
25 TABLET ORAL EVERY MORNING
Status: DISCONTINUED | OUTPATIENT
Start: 2017-06-14 | End: 2017-06-14 | Stop reason: HOSPADM

## 2017-06-13 RX ORDER — CLOPIDOGREL BISULFATE 75 MG/1
75 TABLET ORAL NIGHTLY
Status: DISCONTINUED | OUTPATIENT
Start: 2017-06-13 | End: 2017-06-14 | Stop reason: HOSPADM

## 2017-06-13 RX ORDER — MIDAZOLAM HYDROCHLORIDE 1 MG/ML
INJECTION, SOLUTION INTRAMUSCULAR; INTRAVENOUS
Status: DISCONTINUED | OUTPATIENT
Start: 2017-06-13 | End: 2017-06-13

## 2017-06-13 RX ORDER — INSULIN ASPART 100 [IU]/ML
1-10 INJECTION, SOLUTION INTRAVENOUS; SUBCUTANEOUS
Status: DISCONTINUED | OUTPATIENT
Start: 2017-06-13 | End: 2017-06-14 | Stop reason: HOSPADM

## 2017-06-13 RX ORDER — CYCLOBENZAPRINE HCL 5 MG
10 TABLET ORAL 3 TIMES DAILY PRN
Status: DISCONTINUED | OUTPATIENT
Start: 2017-06-13 | End: 2017-06-14 | Stop reason: HOSPADM

## 2017-06-13 RX ORDER — IBUPROFEN 200 MG
16 TABLET ORAL
Status: DISCONTINUED | OUTPATIENT
Start: 2017-06-13 | End: 2017-06-14 | Stop reason: HOSPADM

## 2017-06-13 RX ORDER — LABETALOL HYDROCHLORIDE 5 MG/ML
INJECTION, SOLUTION INTRAVENOUS
Status: DISCONTINUED | OUTPATIENT
Start: 2017-06-13 | End: 2017-06-13

## 2017-06-13 RX ORDER — PROPOFOL 10 MG/ML
VIAL (ML) INTRAVENOUS CONTINUOUS PRN
Status: DISCONTINUED | OUTPATIENT
Start: 2017-06-13 | End: 2017-06-13

## 2017-06-13 RX ORDER — IBUPROFEN 200 MG
24 TABLET ORAL
Status: DISCONTINUED | OUTPATIENT
Start: 2017-06-13 | End: 2017-06-14 | Stop reason: HOSPADM

## 2017-06-13 RX ADMIN — LIDOCAINE HYDROCHLORIDE 75 MG: 20 INJECTION, SOLUTION INTRAVENOUS at 01:06

## 2017-06-13 RX ADMIN — PHENYLEPHRINE HYDROCHLORIDE 100 MCG: 10 INJECTION INTRAVENOUS at 02:06

## 2017-06-13 RX ADMIN — ONDANSETRON 4 MG: 2 INJECTION INTRAMUSCULAR; INTRAVENOUS at 03:06

## 2017-06-13 RX ADMIN — PHENYLEPHRINE HYDROCHLORIDE 200 MCG: 10 INJECTION INTRAVENOUS at 02:06

## 2017-06-13 RX ADMIN — PROPOFOL 75 MG: 10 INJECTION, EMULSION INTRAVENOUS at 01:06

## 2017-06-13 RX ADMIN — PROPOFOL 150 MCG/KG/MIN: 10 INJECTION, EMULSION INTRAVENOUS at 01:06

## 2017-06-13 RX ADMIN — SODIUM CHLORIDE, SODIUM GLUCONATE, SODIUM ACETATE, POTASSIUM CHLORIDE, MAGNESIUM CHLORIDE, SODIUM PHOSPHATE, DIBASIC, AND POTASSIUM PHOSPHATE: .53; .5; .37; .037; .03; .012; .00082 INJECTION, SOLUTION INTRAVENOUS at 01:06

## 2017-06-13 RX ADMIN — SODIUM CHLORIDE, SODIUM GLUCONATE, SODIUM ACETATE, POTASSIUM CHLORIDE, MAGNESIUM CHLORIDE, SODIUM PHOSPHATE, DIBASIC, AND POTASSIUM PHOSPHATE: .53; .5; .37; .037; .03; .012; .00082 INJECTION, SOLUTION INTRAVENOUS at 03:06

## 2017-06-13 RX ADMIN — MIDAZOLAM 2 MG: 1 INJECTION INTRAMUSCULAR; INTRAVENOUS at 01:06

## 2017-06-13 RX ADMIN — LABETALOL HYDROCHLORIDE 5 MG: 5 INJECTION INTRAVENOUS at 03:06

## 2017-06-13 RX ADMIN — ISOPROTERENOL HYDROCHLORIDE 2 MCG/MIN: 0.2 INJECTION, SOLUTION INTRACARDIAC; INTRAMUSCULAR; INTRAVENOUS; SUBCUTANEOUS at 02:06

## 2017-06-13 NOTE — ANESTHESIA POSTPROCEDURE EVALUATION
"Anesthesia Post Evaluation    Patient: Mayra Pinedo    Procedure(s) Performed: Procedure(s) (LRB):  ABLATION (N/A)    Final Anesthesia Type: general  Patient location during evaluation: PACU  Patient participation: Yes- Able to Participate  Level of consciousness: awake and alert and oriented  Post-procedure vital signs: reviewed and stable  Pain management: adequate  Airway patency: patent  PONV status at discharge: No PONV  Anesthetic complications: no      Cardiovascular status: stable  Respiratory status: unassisted  Hydration status: euvolemic  Follow-up not needed.        Visit Vitals  BP (!) 170/79   Pulse 74   Temp 36.6 °C (97.8 °F) (Oral)   Resp 20   Ht 5' 6" (1.676 m)   Wt 97.5 kg (215 lb)   SpO2 98%   Breastfeeding? No   BMI 34.70 kg/m²       Pain/Cristela Score: Pain Assessment Performed: Yes (6/13/2017  5:30 PM)  Presence of Pain: denies (6/13/2017  5:30 PM)  Cristela Score: 10 (6/13/2017  4:00 PM)      "

## 2017-06-13 NOTE — TRANSFER OF CARE
"Anesthesia Transfer of Care Note    Patient: Mayra Pinedo    Procedure(s) Performed: Procedure(s) (LRB):  ABLATION (N/A)    Patient location: PACU    Anesthesia Type: general    Transport from OR: Transported from OR on 6-10 L/min O2 by face mask with adequate spontaneous ventilation    Post pain: adequate analgesia    Post assessment: no apparent anesthetic complications    Post vital signs: stable    Level of consciousness: sedated    Nausea/Vomiting: no nausea/vomiting    Complications: none    Transfer of care protocol was followed      Last vitals:   Visit Vitals  BP (!) 149/74   Pulse 79   Temp 36.6 °C (97.8 °F) (Axillary)   Resp (!) 21   Ht 5' 6" (1.676 m)   Wt 97.5 kg (215 lb)   SpO2 99%   Breastfeeding? No   BMI 34.70 kg/m²     "

## 2017-06-13 NOTE — ANESTHESIA RELEASE NOTE
"Anesthesia Release from PACU Note    Patient: Mayra Pinedo    Procedure(s) Performed: Procedure(s) (LRB):  ABLATION (N/A)    Anesthesia type: general    Post pain: Adequate analgesia    Post assessment: no apparent anesthetic complications, tolerated procedure well and no evidence of recall    Last Vitals:   Visit Vitals  BP (!) 170/79   Pulse 74   Temp 36.6 °C (97.8 °F) (Oral)   Resp 20   Ht 5' 6" (1.676 m)   Wt 97.5 kg (215 lb)   SpO2 98%   Breastfeeding? No   BMI 34.70 kg/m²       Post vital signs: stable    Level of consciousness: awake, alert  and oriented    Nausea/Vomiting: no nausea/no vomiting    Complications: none    Airway Patency: patent    Respiratory: unassisted    Cardiovascular: stable and blood pressure at baseline    Hydration: euvolemic  "

## 2017-06-13 NOTE — PROGRESS NOTES
Bleeding noted to L Groin dsg. Constant pressure held for 15mins with new dsg applied. Dr. Colorado came to check on pt while RN holding pressure. Hemostasis time reset for 1630. Will continue to monitor.

## 2017-06-13 NOTE — H&P
[facility]  Cardiology Electrophysiology  History and Physical     Patient Name: Mayra Pinedo  MRN: [id number]  Admission Date: 6/13/2017  Attending Provider: Emmanuel Colorado MD  Principal Problem: SVT (supraventricular tachycardia)    Patient information was obtained from patient     Subjective:     Chief Complaint:  SVT     HPI:  72 year old female female withCAD s/p CABG + PCI ( PCI to SVG >> RCA 7/8/16), HTN, DM, obesity with    noted palpitations over the past 5 years.  EKG revealed SVT. Episodes occurring about 3 times per month, last episode was about 1 week ago.   Pt denies any acute symptoms at present. Pt doing well     EKG today reveals NSR at 80 BPM         Past Medical History:   Diagnosis Date    Anticoagulant long-term use     Plavix and ASA therapy    Arthritis     Asthma     seasonal, allergies    Blood in stool     Blood transfusion     Coronary artery disease     stent X1    Diabetes mellitus     GERD (gastroesophageal reflux disease)     Hypertension        Past Surgical History:   Procedure Laterality Date    ACHILLES TENDON SURGERY Left     torn tendon    CARDIAC SURGERY      x 4--22 years ago    cataract surgery      CHOLECYSTECTOMY      cholescystectomy      COLONOSCOPY  7/20/2006  [facility]    One 1 to 2 mm polyp in the proximal descending colon.  HYPERPLASTIC POLYP.    Internal hemorrhoids.    CORONARY STENT PLACEMENT      2009 X 1; 2014 X 1    HYSTERECTOMY            Review of patient's allergies indicates:   Allergen Reactions    Oxycodone-acetaminophen Hives    Digitalis glycosides Other (See Comments)     Severe joint pain    Statins-hmg-coa reductase inhibitors      Myalgias, alpoecia    Erythromycin Other (See Comments)     Pt reports tachycardia and andres skin to neck and face    Latex, natural rubber Nausea Only and Rash     Nausea at dentist    Penicillins Rash    Sulfa (sulfonamide antibiotics) Rash        No current facility-administered  medications on file prior to encounter.      Current Outpatient Prescriptions on File Prior to Encounter   Medication Sig Dispense Refill    aspirin (ECOTRIN) 81 MG EC tablet Take 81 mg by mouth once daily.        carvedilol (COREG) 25 MG tablet Take 1 tablet (25 mg total) by mouth 2 (two) times daily. 180 tablet 5    cetirizine (ZYRTEC) 10 MG tablet Take 1 tablet by mouth every evening.       clopidogrel (PLAVIX) 75 mg tablet Take 1 tablet (75 mg total) by mouth once. (Patient taking differently: Take 75 mg by mouth every evening. ) 90 tablet 6    isosorbide mononitrate (IMDUR) 60 MG 24 hr tablet Take 1 tablet (60 mg total) by mouth every evening. (Patient taking differently: Take 60 mg by mouth once daily. ) 90 tablet 6    LIVALO 4 mg Tab TAKE ONE TABLET BY MOUTH ONCE DAILY 90 tablet 0    losartan (COZAAR) 25 MG tablet Take 1 tablet (25 mg total) by mouth every morning. 90 tablet 6    meloxicam (MOBIC) 7.5 MG tablet Take 15 mg by mouth once daily.       metformin (GLUCOPHAGE-XR) 500 MG 24 hr tablet Take 1 tablet (500 mg total) by mouth once daily. 180 tablet 6    omega-3 fatty acids-vitamin E (FISH OIL) 1,000 mg Cap Take 2,400 mg by mouth 2 (two) times daily.       pantoprazole (PROTONIX) 40 MG tablet Take 40 mg by mouth once daily.      ALBUTEROL SULFATE (VENTOLIN HFA INHL) Inhale 2 Inhalers into the lungs as needed.       cyclobenzaprine (FLEXERIL) 10 MG tablet Take 10 mg by mouth 3 (three) times daily as needed for Muscle spasms.      nitroGLYCERIN (NITROSTAT) 0.4 MG SL tablet Place 1 tablet (0.4 mg total) under the tongue every 5 (five) minutes as needed for Chest pain. 30 tablet 12       Family History   Problem Relation Age of Onset    Hypertension Sister     Heart disease Brother     Hypertension Brother     Coronary artery disease Brother     Obesity Brother     Hypertension Maternal Grandmother     Heart disease Maternal Grandmother     Heart attack Maternal Grandmother      Hypertension Maternal Grandfather     Heart disease Maternal Grandfather     Breast cancer Neg Hx     Ovarian cancer Neg Hx        Social History   Substance Use Topics    Smoking status: Never Smoker    Smokeless tobacco: Not on file    Alcohol use Yes      Comment: 3 glasses wine daily         Review of Systems   Constitution: Negative  for weakness and malaise/fatigue.   HENT: Negative for ear pain and tinnitus.   Eyes: Negative for blurred vision.   Cardiovascular: Positive for chest pain and palpitations  Negative for near-syncope,syncope.   Respiratory:  Negative for shortness of breath.   Endocrine:  Negative for polyuria.   Hematologic/Lymphatic: Negative for bruise/bleed easily.   Skin:  Negative for rash.   Musculoskeletal: Negative for joint pain and muscle weakness.   Gastrointestinal:  Negative for abdominal pain and change in bowel habit.   Genitourinary: Negative for frequency.   Neurological:  Negative for dizziness.   Psychiatric/Behavioral:  Negative for depression, anxiety         Objective:     Temp: 98.2 °F (36.8 °C) (06/13/17 1155)  Pulse: 76 (06/13/17 1155)  Resp: 16 (06/13/17 1155)  BP: (!) 177/79 (06/13/17 1200)  SpO2: 98 % (06/13/17 1155)    Vital Signs (24h Range):  Temp:  [98.2 °F (36.8 °C)]   Pulse:  [76]   Resp:  [16]   BP: (177-206)/(79-86)   SpO2:  [98 %]       PE:   General: Well developed, well nourished. No distress on Room air   HEENT: Head is normocephalic, atraumatic;  Lungs: Clear to auscultation bilaterally.  No wheezing. Normal respiratory effort.  Cardiovascular:  S1 S2 Normal rate, regular rhythm and normal heart sounds.    PMI is not displaced  Extremities: No LE edema. Pulses 2+   Abdomen: Abdomen is soft, non-tender non-distended with normal bowel sounds.  Skin: Skin color, texture, turgor normal. No rashes.  Musculoskeletal: normal range of motion   Neurologic: Normal strength and tone. No focal numbness or weakness.   Psychiatric: normal mood and affect.   behavior is normal. Alert and oriented X 3.      Significant Labs:   Lab Results   Component Value Date    WBC 6.84 2017    HGB 13.5 2017    HCT 39.9 2017    MCV 93 2017     2017     BMP  Lab Results   Component Value Date     2017    K 4.1 2017     2017    CO2 24 2017    BUN 15 2017    CREATININE 0.85 2017    CALCIUM 9.6 2017    ANIONGAP 13 2017    ESTGFRAFRICA >60 2017    EGFRNONAA >60 2017      Lab Results   Component Value Date    INR 0.9 2017    INR 1.0 2016       Significant Imaging:   CONCLUSIONS:    NOTATIONS:  Occluded mid LAD, mid circumflex, vein graft to obtuse marginal, proximal right  coronary artery.  Patent LIMA to LAD.  Patent vein graft to right coronary artery with diffuse disease seen. The  posterolateral branch is seen filling via collaterals with successful  angioplasty and stenting of the PDA using 2.25 x 24, post-dilated to  approximately 2.5 proximally.    RECOMMENDATIONS, PLAN:  Dual antiplatelet therapy for one year and aggressive risk factor modification.    Prepared and signed by    Jun Thompson M.D.  Signed 2016 08:13:08    Recent 17  labs from outside labs reviewed,     EK2017 see above     Assessment and Plan:   73 y/o female with CAD s/p CABG, PCI on ASA/Plavix , with recurrent SVT. Coreg was held prior to procedure, last dose was Saturday morning     RFA for SVT  Anesthesia for sedation       Prior to procedure, extensive discussion with patient regarding risks and benefits of  ablation, and patient  would like to proceed.  Risks of procedure include but are not limited to the risk of infection, bleeding, stroke, paralysis,  MI, death, embolism, perforation requiring emergency draining or surgery, AV block, possibility for need for  pacemaker implantation.  The patient voices understanding and all questions have been answered. No further  questions/concerns voiced at this time.      Signed:  DAPHNEY Berman-BC  Cardiology Electrophysiology  NP   [facility]    Attending: MD Emmanuel Coloraod

## 2017-06-13 NOTE — ANESTHESIA PREPROCEDURE EVALUATION
06/13/2017  Mayra Pinedo is a 72 y.o., female with CAD s/p CABG (1990s) with subsequent PCI (1 year ago) now with SVT here for ablation.    Anesthesia Evaluation    I have reviewed the Patient Summary Reports.    I have reviewed the Nursing Notes.   I have reviewed the Medications.     Review of Systems  Anesthesia Hx:  No problems with previous Anesthesia    Cardiovascular:   Hypertension CAD  CABG/stent     Pulmonary:   Shortness of breath    Hepatic/GI:   GERD    Neurological:  Neurology Normal    Endocrine:   Diabetes, type 2        Physical Exam  General:  Obesity    Airway/Jaw/Neck:  Airway Findings: Mouth Opening: Normal Tongue: Normal  General Airway Assessment: Adult  Mallampati: II  TM Distance: 4 - 6 cm       Chest/Lungs:  Chest/Lungs Findings: Clear to auscultation, Normal Respiratory Rate     Heart/Vascular:  Heart Findings: Rate: Normal  Rhythm: Regular Rhythm             Anesthesia Plan  Type of Anesthesia, risks & benefits discussed:  Anesthesia Type:  general, MAC  Patient's Preference:   Intra-op Monitoring Plan:   Intra-op Monitoring Plan Comments:   Post Op Pain Control Plan: IV/PO Opioids PRN  Post Op Pain Control Plan Comments:   Induction:   IV  Beta Blocker:  Patient is on a Beta-Blocker and has received one dose within the past 24 hours (No further documentation required).       Informed Consent: Patient understands risks and agrees with Anesthesia plan.  Questions answered. Anesthesia consent signed with patient.  ASA Score: 3     Day of Surgery Review of History & Physical:            Ready For Surgery From Anesthesia Perspective.     Patient Active Problem List   Diagnosis    S/P coronary artery stent placement    Coronary artery disease involving native coronary artery of native heart without angina pectoris    Essential hypertension    Dyslipidemia    Type 2 diabetes  mellitus without complication    Aortic sclerosis    S/P CABG (coronary artery bypass graft) x 3 1991    SVT (supraventricular tachycardia)

## 2017-06-14 VITALS
BODY MASS INDEX: 34.55 KG/M2 | OXYGEN SATURATION: 96 % | HEART RATE: 76 BPM | RESPIRATION RATE: 18 BRPM | WEIGHT: 215 LBS | HEIGHT: 66 IN | DIASTOLIC BLOOD PRESSURE: 64 MMHG | TEMPERATURE: 99 F | SYSTOLIC BLOOD PRESSURE: 142 MMHG

## 2017-06-14 LAB — POCT GLUCOSE: 118 MG/DL (ref 70–110)

## 2017-06-14 PROCEDURE — 82962 GLUCOSE BLOOD TEST: CPT

## 2017-06-14 NOTE — DISCHARGE SUMMARY
[facility]  Discharge Note  Short Stay    Admit Date: 6/13/2017    Discharge Date and Time: 06/14/2017     Attending Physician: Emmanuel Colorado MD     Discharge Provider: Ayush Fan    Diagnoses:  Active Hospital Problems    Diagnosis  POA    *SVT (supraventricular tachycardia) [I47.1]  Yes      Resolved Hospital Problems    Diagnosis Date Resolved POA   No resolved problems to display.       Discharged Condition: good    Hospital Course: Patient was admitted for an outpatient procedure and tolerated the procedure well with no complications.  Bilateral groins upon discharge soft, non-tender, no hematoma.    Final Diagnoses: Same as principal problem.    Disposition: Home or Self Care    Follow up/Patient Instructions:    Medications:  Reconciled Home Medications:   Current Discharge Medication List      CONTINUE these medications which have NOT CHANGED    Details   aspirin (ECOTRIN) 81 MG EC tablet Take 81 mg by mouth once daily.        carvedilol (COREG) 25 MG tablet Take 1 tablet (25 mg total) by mouth 2 (two) times daily.  Qty: 180 tablet, Refills: 5    Associated Diagnoses: S/P coronary artery stent placement; S/P CABG (coronary artery bypass graft); Dyslipidemia; Essential hypertension; Coronary artery disease involving native coronary artery of native heart without angina pectoris      cetirizine (ZYRTEC) 10 MG tablet Take 1 tablet by mouth every evening.       clopidogrel (PLAVIX) 75 mg tablet Take 1 tablet (75 mg total) by mouth once.  Qty: 90 tablet, Refills: 6    Associated Diagnoses: S/P coronary artery stent placement; Coronary artery disease involving native coronary artery of native heart without angina pectoris; Essential hypertension; Dyslipidemia; Type 2 diabetes mellitus without complication; Aortic sclerosis      isosorbide mononitrate (IMDUR) 60 MG 24 hr tablet Take 1 tablet (60 mg total) by mouth every evening.  Qty: 90 tablet, Refills: 6    Associated Diagnoses: S/P coronary  artery stent placement; Coronary artery disease involving native coronary artery of native heart without angina pectoris; Essential hypertension; Dyslipidemia; Type 2 diabetes mellitus without complication; Aortic sclerosis      LIVALO 4 mg Tab TAKE ONE TABLET BY MOUTH ONCE DAILY  Qty: 90 tablet, Refills: 0      losartan (COZAAR) 25 MG tablet Take 1 tablet (25 mg total) by mouth every morning.  Qty: 90 tablet, Refills: 6    Associated Diagnoses: Essential hypertension; Dyslipidemia; Aortic sclerosis; S/P coronary artery stent placement; Coronary artery disease involving native coronary artery of native heart without angina pectoris      meloxicam (MOBIC) 7.5 MG tablet Take 15 mg by mouth once daily.       metformin (GLUCOPHAGE-XR) 500 MG 24 hr tablet Take 1 tablet (500 mg total) by mouth once daily.  Qty: 180 tablet, Refills: 6    Associated Diagnoses: S/P coronary artery stent placement; Coronary artery disease involving native coronary artery of native heart without angina pectoris; Essential hypertension; Dyslipidemia; Type 2 diabetes mellitus without complication; Aortic sclerosis      omega-3 fatty acids-vitamin E (FISH OIL) 1,000 mg Cap Take 2,400 mg by mouth 2 (two) times daily.       pantoprazole (PROTONIX) 40 MG tablet Take 40 mg by mouth once daily.      ALBUTEROL SULFATE (VENTOLIN HFA INHL) Inhale 2 Inhalers into the lungs as needed.       cyclobenzaprine (FLEXERIL) 10 MG tablet Take 10 mg by mouth 3 (three) times daily as needed for Muscle spasms.      nitroGLYCERIN (NITROSTAT) 0.4 MG SL tablet Place 1 tablet (0.4 mg total) under the tongue every 5 (five) minutes as needed for Chest pain.  Qty: 30 tablet, Refills: 12    Associated Diagnoses: S/P coronary artery stent placement; Coronary artery disease involving native coronary artery of native heart without angina pectoris; Essential hypertension; Dyslipidemia; Type 2 diabetes mellitus without complication; Aortic sclerosis           No discharge  procedures on file.  Follow-up Information     Emmanuel Colorado MD In 6 weeks.    Specialties:  Electrophysiology, Cardiology  Contact information:  [facility] [id number] [phone]                   Discharge Procedure Orders (must include Diet, Follow-up, Activity):  No discharge procedures on file.

## 2017-06-14 NOTE — NURSING TRANSFER
Nursing Transfer Note      6/13/2017     Transfer From: pacu    Transfer via stretcher    Transfer with cardiac monitoring    Transported by RN    Medicines sent: none    Chart send with patient: Yes    Notified: spouse    Patient reassessed at: 1830  6/13/2017    Upon arrival to floor: cardiac monitor applied, patient oriented to room, call bell in reach and bed in lowest position

## 2017-06-19 ENCOUNTER — PATIENT MESSAGE (OUTPATIENT)
Dept: CARDIOLOGY | Facility: CLINIC | Age: 72
End: 2017-06-19

## 2017-06-26 ENCOUNTER — TELEPHONE (OUTPATIENT)
Dept: ELECTROPHYSIOLOGY | Facility: CLINIC | Age: 72
End: 2017-06-26

## 2017-06-26 ENCOUNTER — HOSPITAL ENCOUNTER (OUTPATIENT)
Dept: RADIOLOGY | Facility: HOSPITAL | Age: 72
Discharge: HOME | End: 2017-06-26
Attending: OBSTETRICS & GYNECOLOGY
Payer: MEDICARE

## 2017-06-26 VITALS — BODY MASS INDEX: 34.55 KG/M2 | HEIGHT: 66 IN | WEIGHT: 215 LBS

## 2017-06-26 DIAGNOSIS — Z12.31 VISIT FOR SCREENING MAMMOGRAM: Primary | ICD-10-CM

## 2017-06-26 DIAGNOSIS — Z12.31 VISIT FOR SCREENING MAMMOGRAM: ICD-10-CM

## 2017-06-26 PROCEDURE — 77067 SCR MAMMO BI INCL CAD: CPT | Mod: TC

## 2017-06-26 PROCEDURE — 77067 SCR MAMMO BI INCL CAD: CPT | Mod: 26,,, | Performed by: RADIOLOGY

## 2017-06-26 PROCEDURE — 77063 BREAST TOMOSYNTHESIS BI: CPT | Mod: 26,,, | Performed by: RADIOLOGY

## 2017-06-26 NOTE — TELEPHONE ENCOUNTER
Pt called and LM that she will not be able to keep appts on 8/2/17- rescheduled appt for her convenience.

## 2017-07-12 DIAGNOSIS — I25.10 CORONARY ARTERY DISEASE INVOLVING NATIVE CORONARY ARTERY OF NATIVE HEART WITHOUT ANGINA PECTORIS: Chronic | ICD-10-CM

## 2017-07-12 DIAGNOSIS — Z95.5 S/P CORONARY ARTERY STENT PLACEMENT: Chronic | ICD-10-CM

## 2017-07-12 DIAGNOSIS — E78.5 DYSLIPIDEMIA: ICD-10-CM

## 2017-07-12 DIAGNOSIS — I10 ESSENTIAL HYPERTENSION: ICD-10-CM

## 2017-07-12 DIAGNOSIS — E11.9 TYPE 2 DIABETES MELLITUS WITHOUT COMPLICATION: ICD-10-CM

## 2017-07-12 RX ORDER — CLOPIDOGREL BISULFATE 75 MG/1
TABLET ORAL
Qty: 90 TABLET | Refills: 0 | Status: SHIPPED | OUTPATIENT
Start: 2017-07-12 | End: 2017-10-18 | Stop reason: SDUPTHER

## 2017-07-20 DIAGNOSIS — E11.9 TYPE 2 DIABETES MELLITUS WITHOUT COMPLICATION: ICD-10-CM

## 2017-07-20 DIAGNOSIS — I25.10 CORONARY ARTERY DISEASE INVOLVING NATIVE CORONARY ARTERY OF NATIVE HEART WITHOUT ANGINA PECTORIS: Chronic | ICD-10-CM

## 2017-07-20 DIAGNOSIS — I10 ESSENTIAL HYPERTENSION: ICD-10-CM

## 2017-07-20 DIAGNOSIS — E78.5 DYSLIPIDEMIA: ICD-10-CM

## 2017-07-20 DIAGNOSIS — Z95.5 S/P CORONARY ARTERY STENT PLACEMENT: Chronic | ICD-10-CM

## 2017-07-20 RX ORDER — PITAVASTATIN CALCIUM 4.18 MG/1
TABLET, FILM COATED ORAL
Qty: 90 TABLET | Refills: 4 | Status: SHIPPED | OUTPATIENT
Start: 2017-07-20 | End: 2017-11-30 | Stop reason: SDUPTHER

## 2017-07-20 RX ORDER — ISOSORBIDE MONONITRATE 60 MG/1
TABLET, EXTENDED RELEASE ORAL
Qty: 90 TABLET | Refills: 4 | Status: SHIPPED | OUTPATIENT
Start: 2017-07-20 | End: 2017-08-08 | Stop reason: SDUPTHER

## 2017-08-08 ENCOUNTER — OFFICE VISIT (OUTPATIENT)
Dept: ELECTROPHYSIOLOGY | Facility: CLINIC | Age: 72
End: 2017-08-08
Payer: MEDICARE

## 2017-08-08 ENCOUNTER — HOSPITAL ENCOUNTER (OUTPATIENT)
Dept: CARDIOLOGY | Facility: CLINIC | Age: 72
Discharge: HOME | End: 2017-08-08
Payer: MEDICARE

## 2017-08-08 VITALS
SYSTOLIC BLOOD PRESSURE: 124 MMHG | HEIGHT: 66 IN | WEIGHT: 219.56 LBS | HEART RATE: 72 BPM | BODY MASS INDEX: 35.29 KG/M2 | DIASTOLIC BLOOD PRESSURE: 68 MMHG

## 2017-08-08 DIAGNOSIS — I47.10 SVT (SUPRAVENTRICULAR TACHYCARDIA): ICD-10-CM

## 2017-08-08 DIAGNOSIS — E11.9 TYPE 2 DIABETES MELLITUS WITHOUT COMPLICATION, WITHOUT LONG-TERM CURRENT USE OF INSULIN: ICD-10-CM

## 2017-08-08 DIAGNOSIS — I25.10 CORONARY ARTERY DISEASE INVOLVING NATIVE CORONARY ARTERY OF NATIVE HEART WITHOUT ANGINA PECTORIS: Chronic | ICD-10-CM

## 2017-08-08 DIAGNOSIS — Z95.1 S/P CABG (CORONARY ARTERY BYPASS GRAFT): ICD-10-CM

## 2017-08-08 DIAGNOSIS — E78.5 DYSLIPIDEMIA: ICD-10-CM

## 2017-08-08 DIAGNOSIS — E66.01 SEVERE OBESITY (BMI 35.0-39.9) WITH COMORBIDITY: ICD-10-CM

## 2017-08-08 DIAGNOSIS — Z95.5 S/P CORONARY ARTERY STENT PLACEMENT: Chronic | ICD-10-CM

## 2017-08-08 DIAGNOSIS — I47.10 SVT (SUPRAVENTRICULAR TACHYCARDIA): Primary | ICD-10-CM

## 2017-08-08 DIAGNOSIS — I10 ESSENTIAL HYPERTENSION: ICD-10-CM

## 2017-08-08 PROCEDURE — 1125F AMNT PAIN NOTED PAIN PRSNT: CPT | Mod: S$GLB,,, | Performed by: NURSE PRACTITIONER

## 2017-08-08 PROCEDURE — 3078F DIAST BP <80 MM HG: CPT | Mod: S$GLB,,, | Performed by: NURSE PRACTITIONER

## 2017-08-08 PROCEDURE — 99999 PR PBB SHADOW E&M-EST. PATIENT-LVL III: CPT | Mod: PBBFAC,,, | Performed by: NURSE PRACTITIONER

## 2017-08-08 PROCEDURE — 3074F SYST BP LT 130 MM HG: CPT | Mod: S$GLB,,, | Performed by: NURSE PRACTITIONER

## 2017-08-08 PROCEDURE — 93000 ELECTROCARDIOGRAM COMPLETE: CPT | Mod: S$GLB,,, | Performed by: INTERNAL MEDICINE

## 2017-08-08 PROCEDURE — 3008F BODY MASS INDEX DOCD: CPT | Mod: S$GLB,,, | Performed by: NURSE PRACTITIONER

## 2017-08-08 PROCEDURE — 1159F MED LIST DOCD IN RCRD: CPT | Mod: S$GLB,,, | Performed by: NURSE PRACTITIONER

## 2017-08-08 PROCEDURE — 99214 OFFICE O/P EST MOD 30 MIN: CPT | Mod: S$GLB,,, | Performed by: NURSE PRACTITIONER

## 2017-08-08 PROCEDURE — 4010F ACE/ARB THERAPY RXD/TAKEN: CPT | Mod: S$GLB,,, | Performed by: NURSE PRACTITIONER

## 2017-08-08 RX ORDER — DICLOFENAC SODIUM 10 MG/G
4 GEL TOPICAL
COMMUNITY
Start: 2017-07-14

## 2017-08-08 RX ORDER — FENOFIBRATE 54 MG/1
54 TABLET ORAL DAILY
COMMUNITY
End: 2018-01-24 | Stop reason: SDUPTHER

## 2017-08-08 NOTE — PROGRESS NOTES
Subjective:    Patient ID:  Mayra Pinedo is a 72 y.o. female who presents for follow-up of SVT.     Mayra Pinedo is a patient of Dr. Colorado.  Primary Cardiologist: Dr. Thompson.    HPI     Ms. Pinedo is a 72 y.o. female with SVT s/p recent RFA (no accessory pathway; AT mapped to the posterior aspect near the IVC), CAD s/p CABG + PCI (2016), HTN, dyslipidemia, DM, and severe obesity. Ms. Pinedo has a long-standing hx of palpitations, dating back to at least 2012. She had historically been able to terminate episodes with deep breathing and/or valsalva maneuvers.  More recently however, Ms. Pinedo presented to [facility] ED (02/18/17) with sustained palpitations and associated chest pressure; she had been unable to terminate this episode. An ECG at the time revealed SVT at 160 bpm. She was given IV Lopressor, and subsequently converted to NSR.  At her initial [facility] EP [facility] visit (04/24/17), she reported experiencing episodes post-discharge. In the past, her episodes had occurred once every 2 months, on average.   Ms. Pinedo underwent a successful SVT RFA (06/13/17) without complication; EPS revealed normal baseline intervals, no evidence of an accessory pathway, and an AT, mapped to the posterior aspect near the IVC s/p successful RFA. Since the procedure, Ms. Pinedo reports feeling well overall. She notes occasional baseline SANCHEZ, but denies chest pain, overt SOB, dizziness, palpitations, or syncope. She states that her energy level remains stable. She has felt like she was going to have an episode (unusual sensation in her head preceding episodes in the past) in the first 2-3 weeks post-procedure, but the SVT never started; no recurrence. Ms. Pinedo remains very active without difficulty.     I reviewed today's ECG which demonstrated NSR at 72 bpm; , QRS 90, and QTc 427.    Review of Systems   Constitution: Negative for diaphoresis and malaise/fatigue.   HENT: Negative for headaches.    Eyes: Negative for  double vision.   Cardiovascular: Positive for dyspnea on exertion (baseline). Negative for chest pain, irregular heartbeat, near-syncope, palpitations and syncope.   Respiratory: Negative for shortness of breath.    Skin: Negative.    Musculoskeletal: Negative.    Neurological: Negative for dizziness and light-headedness.   Psychiatric/Behavioral: Negative for altered mental status.        Objective:    Physical Exam   Constitutional: She is oriented to person, place, and time. She appears well-developed and well-nourished.   HENT:   Head: Normocephalic and atraumatic.   Eyes: Pupils are equal, round, and reactive to light.   Cardiovascular: Normal rate, regular rhythm, normal heart sounds and intact distal pulses.    Pulmonary/Chest: Effort normal and breath sounds normal.   Musculoskeletal: Normal range of motion.   Neurological: She is alert and oriented to person, place, and time.   Skin: She is not diaphoretic.   Vitals reviewed.        Assessment:       1. SVT (supraventricular tachycardia)    2. Coronary artery disease involving native coronary artery of native heart without angina pectoris    3. S/P CABG (coronary artery bypass graft) x 3 1991    4. S/P coronary artery stent placement    5. Essential hypertension    6. Dyslipidemia    7. Type 2 diabetes mellitus without complication, without long-term current use of insulin    8. Severe obesity (BMI 35.0-39.9) with comorbidity         Plan:       In summary, Ms. Pinedo is a 72 y.o. female with SVT s/p recent RFA (no accessory pathway; AT mapped to the posterior aspect near the IVC), CAD s/p CABG + PCI, HTN, dyslipidemia, DM, and severe obesity. Ms. Pinedo is doing well from a rhythm perspective s/p recent RFA without clinical SVT/AT recurrence.    Continue current medication regimen.   Follow up with Dr. Thompson (Cardiology) as directed.   Follow up in EP clinic PRN.      Mirna Brown, MN, APRN, FNP-C      (A copy of today's note was sent to   [facility]

## 2017-09-26 DIAGNOSIS — I10 ESSENTIAL HYPERTENSION: ICD-10-CM

## 2017-09-26 DIAGNOSIS — E78.5 DYSLIPIDEMIA: ICD-10-CM

## 2017-09-26 DIAGNOSIS — E11.9 TYPE 2 DIABETES MELLITUS WITHOUT COMPLICATION: ICD-10-CM

## 2017-09-26 DIAGNOSIS — Z95.5 S/P CORONARY ARTERY STENT PLACEMENT: Chronic | ICD-10-CM

## 2017-09-26 DIAGNOSIS — I25.10 CORONARY ARTERY DISEASE INVOLVING NATIVE CORONARY ARTERY OF NATIVE HEART WITHOUT ANGINA PECTORIS: Chronic | ICD-10-CM

## 2017-09-26 NOTE — TELEPHONE ENCOUNTER
----- Message from Estephania Us sent at 9/26/2017  9:02 AM CDT -----  Contact: taryn Correa, and carotid will not schedule at written   Please call pt to schedule, she has had this happen before and had to reschedule   Call back  or cell  [phone]

## 2017-09-27 RX ORDER — METFORMIN HYDROCHLORIDE 500 MG/1
TABLET, EXTENDED RELEASE ORAL
Qty: 180 TABLET | Refills: 6 | Status: SHIPPED | OUTPATIENT
Start: 2017-09-27 | End: 2018-10-11 | Stop reason: SDUPTHER

## 2017-10-04 ENCOUNTER — PATIENT MESSAGE (OUTPATIENT)
Dept: CARDIOLOGY | Facility: CLINIC | Age: 72
End: 2017-10-04

## 2017-10-18 DIAGNOSIS — I25.10 CORONARY ARTERY DISEASE INVOLVING NATIVE CORONARY ARTERY OF NATIVE HEART WITHOUT ANGINA PECTORIS: Chronic | ICD-10-CM

## 2017-10-18 DIAGNOSIS — Z95.5 S/P CORONARY ARTERY STENT PLACEMENT: Chronic | ICD-10-CM

## 2017-10-18 DIAGNOSIS — E78.5 DYSLIPIDEMIA: ICD-10-CM

## 2017-10-18 DIAGNOSIS — I10 ESSENTIAL HYPERTENSION: ICD-10-CM

## 2017-10-18 DIAGNOSIS — E11.9 TYPE 2 DIABETES MELLITUS WITHOUT COMPLICATION: ICD-10-CM

## 2017-10-18 RX ORDER — CLOPIDOGREL BISULFATE 75 MG/1
TABLET ORAL
Qty: 90 TABLET | Refills: 0 | Status: SHIPPED | OUTPATIENT
Start: 2017-10-18 | End: 2018-01-19 | Stop reason: SDUPTHER

## 2017-11-15 ENCOUNTER — PATIENT MESSAGE (OUTPATIENT)
Dept: CARDIOLOGY | Facility: CLINIC | Age: 72
End: 2017-11-15

## 2017-11-15 ENCOUNTER — CLINICAL SUPPORT (OUTPATIENT)
Dept: CARDIOLOGY | Facility: CLINIC | Age: 72
End: 2017-11-15
Payer: MEDICARE

## 2017-11-15 ENCOUNTER — LAB VISIT (OUTPATIENT)
Dept: LAB | Facility: HOSPITAL | Age: 72
End: 2017-11-15
Attending: INTERNAL MEDICINE
Payer: MEDICARE

## 2017-11-15 DIAGNOSIS — I35.0 NONRHEUMATIC AORTIC VALVE STENOSIS: ICD-10-CM

## 2017-11-15 DIAGNOSIS — E78.5 DYSLIPIDEMIA: ICD-10-CM

## 2017-11-15 DIAGNOSIS — I25.10 CORONARY ARTERY DISEASE INVOLVING NATIVE CORONARY ARTERY OF NATIVE HEART WITHOUT ANGINA PECTORIS: Chronic | ICD-10-CM

## 2017-11-15 DIAGNOSIS — I70.0 ATHEROSCLEROSIS OF AORTA: Chronic | ICD-10-CM

## 2017-11-15 DIAGNOSIS — Z95.1 S/P CABG (CORONARY ARTERY BYPASS GRAFT): ICD-10-CM

## 2017-11-15 DIAGNOSIS — Z95.5 S/P CORONARY ARTERY STENT PLACEMENT: Chronic | ICD-10-CM

## 2017-11-15 DIAGNOSIS — I10 ESSENTIAL HYPERTENSION: ICD-10-CM

## 2017-11-15 DIAGNOSIS — R94.39 ABNORMAL THALLIUM STRESS TEST: ICD-10-CM

## 2017-11-15 DIAGNOSIS — E11.9 TYPE 2 DIABETES MELLITUS WITHOUT COMPLICATION, WITHOUT LONG-TERM CURRENT USE OF INSULIN: ICD-10-CM

## 2017-11-15 LAB
ALBUMIN SERPL BCP-MCNC: 3.6 G/DL
ALP SERPL-CCNC: 49 U/L
ALT SERPL W/O P-5'-P-CCNC: 29 U/L
ANION GAP SERPL CALC-SCNC: 8 MMOL/L
AORTIC VALVE STENOSIS: ABNORMAL
AST SERPL-CCNC: 26 U/L
BASOPHILS # BLD AUTO: 0.04 K/UL
BASOPHILS NFR BLD: 0.8 %
BILIRUB SERPL-MCNC: 0.6 MG/DL
BUN SERPL-MCNC: 11 MG/DL
CALCIUM SERPL-MCNC: 9.7 MG/DL
CHLORIDE SERPL-SCNC: 107 MMOL/L
CHOLEST SERPL-MCNC: 126 MG/DL
CHOLEST/HDLC SERPL: 3.8 {RATIO}
CK SERPL-CCNC: 61 U/L
CO2 SERPL-SCNC: 27 MMOL/L
CREAT SERPL-MCNC: 1 MG/DL
DIASTOLIC DYSFUNCTION: YES
DIFFERENTIAL METHOD: ABNORMAL
EOSINOPHIL # BLD AUTO: 0.1 K/UL
EOSINOPHIL NFR BLD: 2 %
ERYTHROCYTE [DISTWIDTH] IN BLOOD BY AUTOMATED COUNT: 14 %
EST. GFR  (AFRICAN AMERICAN): >60 ML/MIN/1.73 M^2
EST. GFR  (NON AFRICAN AMERICAN): 57.2 ML/MIN/1.73 M^2
ESTIMATED PA SYSTOLIC PRESSURE: 37.11
GLUCOSE SERPL-MCNC: 127 MG/DL
HCT VFR BLD AUTO: 36.5 %
HDLC SERPL-MCNC: 33 MG/DL
HDLC SERPL: 26.2 %
HGB BLD-MCNC: 12.1 G/DL
IMM GRANULOCYTES # BLD AUTO: 0.02 K/UL
IMM GRANULOCYTES NFR BLD AUTO: 0.4 %
INTERNAL CAROTID STENOSIS: NORMAL
LDLC SERPL CALC-MCNC: 37.8 MG/DL
LYMPHOCYTES # BLD AUTO: 1.1 K/UL
LYMPHOCYTES NFR BLD: 21.3 %
MCH RBC QN AUTO: 31.4 PG
MCHC RBC AUTO-ENTMCNC: 33.2 G/DL
MCV RBC AUTO: 95 FL
MITRAL VALVE MOBILITY: NORMAL
MITRAL VALVE REGURGITATION: ABNORMAL
MONOCYTES # BLD AUTO: 0.5 K/UL
MONOCYTES NFR BLD: 8.9 %
NEUTROPHILS # BLD AUTO: 3.4 K/UL
NEUTROPHILS NFR BLD: 66.6 %
NONHDLC SERPL-MCNC: 93 MG/DL
NRBC BLD-RTO: 0 /100 WBC
PLATELET # BLD AUTO: 169 K/UL
PMV BLD AUTO: 11.6 FL
POTASSIUM SERPL-SCNC: 4.6 MMOL/L
PROT SERPL-MCNC: 7.1 G/DL
RBC # BLD AUTO: 3.85 M/UL
RETIRED EF AND QEF - SEE NOTES: 55 (ref 55–65)
SODIUM SERPL-SCNC: 142 MMOL/L
TRICUSPID VALVE REGURGITATION: ABNORMAL
TRIGL SERPL-MCNC: 276 MG/DL
WBC # BLD AUTO: 5.08 K/UL

## 2017-11-15 PROCEDURE — 80053 COMPREHEN METABOLIC PANEL: CPT

## 2017-11-15 PROCEDURE — 82550 ASSAY OF CK (CPK): CPT

## 2017-11-15 PROCEDURE — 93880 EXTRACRANIAL BILAT STUDY: CPT | Mod: S$GLB,,, | Performed by: INTERNAL MEDICINE

## 2017-11-15 PROCEDURE — 85025 COMPLETE CBC W/AUTO DIFF WBC: CPT

## 2017-11-15 PROCEDURE — 80061 LIPID PANEL: CPT

## 2017-11-15 PROCEDURE — 93306 TTE W/DOPPLER COMPLETE: CPT | Mod: S$GLB,,, | Performed by: INTERNAL MEDICINE

## 2017-11-15 PROCEDURE — 36415 COLL VENOUS BLD VENIPUNCTURE: CPT | Mod: PO

## 2017-11-30 ENCOUNTER — OFFICE VISIT (OUTPATIENT)
Dept: CARDIOLOGY | Facility: CLINIC | Age: 72
End: 2017-11-30
Payer: MEDICARE

## 2017-11-30 VITALS
HEART RATE: 83 BPM | BODY MASS INDEX: 34.86 KG/M2 | WEIGHT: 216.94 LBS | SYSTOLIC BLOOD PRESSURE: 130 MMHG | DIASTOLIC BLOOD PRESSURE: 72 MMHG | HEIGHT: 66 IN

## 2017-11-30 DIAGNOSIS — I47.10 SVT (SUPRAVENTRICULAR TACHYCARDIA): ICD-10-CM

## 2017-11-30 DIAGNOSIS — Z95.5 S/P CORONARY ARTERY STENT PLACEMENT: Chronic | ICD-10-CM

## 2017-11-30 DIAGNOSIS — I10 ESSENTIAL HYPERTENSION: ICD-10-CM

## 2017-11-30 DIAGNOSIS — Z78.9 STATIN INTOLERANCE: ICD-10-CM

## 2017-11-30 DIAGNOSIS — E78.5 DYSLIPIDEMIA: ICD-10-CM

## 2017-11-30 DIAGNOSIS — I25.10 CORONARY ARTERY DISEASE INVOLVING NATIVE CORONARY ARTERY OF NATIVE HEART WITHOUT ANGINA PECTORIS: Chronic | ICD-10-CM

## 2017-11-30 DIAGNOSIS — Z95.1 S/P CABG (CORONARY ARTERY BYPASS GRAFT): Primary | ICD-10-CM

## 2017-11-30 PROCEDURE — 99999 PR PBB SHADOW E&M-EST. PATIENT-LVL III: CPT | Mod: PBBFAC,,, | Performed by: INTERNAL MEDICINE

## 2017-11-30 PROCEDURE — 99214 OFFICE O/P EST MOD 30 MIN: CPT | Mod: S$GLB,,, | Performed by: INTERNAL MEDICINE

## 2017-11-30 RX ORDER — MAGNESIUM 200 MG
TABLET ORAL ONCE
COMMUNITY
End: 2020-06-24

## 2017-11-30 RX ORDER — PITAVASTATIN CALCIUM 4.18 MG/1
1 TABLET, FILM COATED ORAL DAILY
Qty: 90 TABLET | Refills: 4 | Status: SHIPPED | OUTPATIENT
Start: 2017-11-30 | End: 2020-06-24

## 2017-11-30 NOTE — PROGRESS NOTES
Subjective:    Patient ID:  Mayra Pinedo is a 72 y.o. female who presents for follow-up of Hypertension (6 month f/u - review echo, carotid and labs ) and Aortic Stenosis      HPI  Here for follow up of CABG- PCI (NICOLE 7/16)/atrial tach s/p RFA (6/17). 1 episode palpitation at night in bed with wine and peanuts abruptly converted to NSR. No angina. Mild occasional rt calf claudication.     Review of Systems   Constitution: Negative for malaise/fatigue.   Eyes: Negative for blurred vision.   Cardiovascular: Negative for chest pain, claudication, cyanosis, dyspnea on exertion, irregular heartbeat, leg swelling, near-syncope, orthopnea, palpitations, paroxysmal nocturnal dyspnea and syncope.   Respiratory: Negative for cough and shortness of breath.    Hematologic/Lymphatic: Does not bruise/bleed easily.   Musculoskeletal: Negative for back pain, falls, joint pain, muscle cramps, muscle weakness and myalgias.   Gastrointestinal: Negative for abdominal pain, change in bowel habit, nausea and vomiting.   Genitourinary: Negative for urgency.   Neurological: Negative for dizziness, focal weakness and light-headedness.        Objective:    Physical Exam   Constitutional: She is oriented to person, place, and time. She appears well-developed and well-nourished.   HENT:   Head: Normocephalic.   Eyes: Conjunctivae are normal.   Neck: Normal range of motion. Neck supple. No JVD present.   Cardiovascular: Normal rate, regular rhythm, normal heart sounds and intact distal pulses.    Pulses:       Carotid pulses are 2+ on the right side, and 2+ on the left side.       Radial pulses are 2+ on the right side, and 2+ on the left side.        Dorsalis pedis pulses are 2+ on the right side, and 2+ on the left side.        Posterior tibial pulses are 2+ on the right side, and 2+ on the left side.   Well healed midline sternal incision.     Pulmonary/Chest: Effort normal and breath sounds normal.   Abdominal: Soft. Bowel sounds are  normal.   Musculoskeletal: She exhibits no edema or tenderness.   Neurological: She is alert and oriented to person, place, and time. Gait normal.   Skin: Skin is warm, dry and intact. No cyanosis. Nails show no clubbing.   Psychiatric: She has a normal mood and affect. Her speech is normal and behavior is normal. Thought content normal.           ..    Chemistry        Component Value Date/Time     11/15/2017 1017    K 4.6 11/15/2017 1017     11/15/2017 1017    CO2 27 11/15/2017 1017    BUN 11 11/15/2017 1017    CREATININE 1.0 11/15/2017 1017     (H) 11/15/2017 1017        Component Value Date/Time    CALCIUM 9.7 11/15/2017 1017    ALKPHOS 49 (L) 11/15/2017 1017    AST 26 11/15/2017 1017    ALT 29 11/15/2017 1017    BILITOT 0.6 11/15/2017 1017    ESTGFRAFRICA >60.0 11/15/2017 1017    EGFRNONAA 57.2 (A) 11/15/2017 1017            ..  Lab Results   Component Value Date    CHOL 126 11/15/2017    CHOL 134 06/21/2016     Lab Results   Component Value Date    HDL 33 (L) 11/15/2017    HDL 34 (L) 06/21/2016     Lab Results   Component Value Date    LDLCALC 37.8 (L) 11/15/2017    LDLCALC 59.8 (L) 06/21/2016     Lab Results   Component Value Date    TRIG 276 (H) 11/15/2017    TRIG 201 (H) 06/21/2016     Lab Results   Component Value Date    CHOLHDL 26.2 11/15/2017    CHOLHDL 25.4 06/21/2016     ..  Lab Results   Component Value Date    WBC 5.08 11/15/2017    HGB 12.1 11/15/2017    HCT 36.5 (L) 11/15/2017    MCV 95 11/15/2017     11/15/2017       Test(s) Reviewed  I have reviewed the following in detail:  [] Stress test   [] Angiography   [x] Echocardiogram   [x] Labs   [x] Other:       Assessment:         ICD-10-CM ICD-9-CM   1. S/P CABG (coronary artery bypass graft) x 3 1991 Z95.1 V45.81   2. S/P coronary artery stent placement Z95.5 V45.82   3. Coronary artery disease involving native coronary artery of native heart without angina pectoris I25.10 414.01   4. Dyslipidemia E78.5 272.4   5.  Essential hypertension I10 401.9   6. SVT (supraventricular tachycardia) I47.1 427.89     Problem List Items Addressed This Visit     Coronary artery disease involving native coronary artery of native heart without angina pectoris (Chronic)    Dyslipidemia    Essential hypertension    S/P CABG (coronary artery bypass graft) x 3 1991 - Primary    Overview     S/P CABG  x 3 1991 LIMA-LAD, VG-D (occluded), VG-RCA         S/P coronary artery stent placement (Chronic)    Overview     7/2016 PDA- 2.25 x 24, synergy    8/15 lad/diag promus 2.5x16    8/2009 vg-rca- promus 3.5x8         SVT (supraventricular tachycardia)    Overview     6/2017 EPS  1.  Normal baseline intervals.  2.  No evidence of an accessory pathway.  3.  Atrial tachycardia mapped to the posterior aspect near the IVC, status post radiofrequency ablation.                Plan:           Return to clinic 6 months   Low level/low impact aerobic exercise 5x's/wk. Heart healthy diet and risk factor modification.    See labs and med orders.

## 2017-12-05 ENCOUNTER — PATIENT MESSAGE (OUTPATIENT)
Dept: CARDIOLOGY | Facility: CLINIC | Age: 72
End: 2017-12-05

## 2018-01-19 DIAGNOSIS — I25.10 CORONARY ARTERY DISEASE INVOLVING NATIVE CORONARY ARTERY OF NATIVE HEART WITHOUT ANGINA PECTORIS: Chronic | ICD-10-CM

## 2018-01-19 DIAGNOSIS — Z95.5 S/P CORONARY ARTERY STENT PLACEMENT: Chronic | ICD-10-CM

## 2018-01-19 DIAGNOSIS — I10 ESSENTIAL HYPERTENSION: ICD-10-CM

## 2018-01-19 DIAGNOSIS — E78.5 DYSLIPIDEMIA: ICD-10-CM

## 2018-01-19 DIAGNOSIS — E11.9 TYPE 2 DIABETES MELLITUS WITHOUT COMPLICATION: ICD-10-CM

## 2018-01-19 RX ORDER — CLOPIDOGREL BISULFATE 75 MG/1
TABLET ORAL
Qty: 90 TABLET | Refills: 4 | Status: SHIPPED | OUTPATIENT
Start: 2018-01-19 | End: 2019-01-25 | Stop reason: SDUPTHER

## 2018-01-24 ENCOUNTER — PATIENT MESSAGE (OUTPATIENT)
Dept: CARDIOLOGY | Facility: CLINIC | Age: 73
End: 2018-01-24

## 2018-01-24 RX ORDER — FENOFIBRATE 54 MG/1
54 TABLET ORAL DAILY
Qty: 90 TABLET | Refills: 3 | Status: SHIPPED | OUTPATIENT
Start: 2018-01-24 | End: 2019-02-03 | Stop reason: SDUPTHER

## 2018-01-24 NOTE — TELEPHONE ENCOUNTER
Request for Cardiac Clearance    Mayra Pinedo  is having: Rotator cuff surgery     Patient is currently taking: ASA & Plavix     Please advise on clearance and direction for procedure and holding medications.

## 2018-01-29 ENCOUNTER — PATIENT MESSAGE (OUTPATIENT)
Dept: CARDIOLOGY | Facility: CLINIC | Age: 73
End: 2018-01-29

## 2018-01-29 NOTE — TELEPHONE ENCOUNTER
Request for Cardiac Clearance    Mayra Pinedo  is having: shoulder surgery    Patient is currently taking: ASA and Plavix    Please advise on clearance for procedure and holding medications.

## 2018-01-30 ENCOUNTER — PATIENT MESSAGE (OUTPATIENT)
Dept: CARDIOLOGY | Facility: CLINIC | Age: 73
End: 2018-01-30

## 2018-01-31 ENCOUNTER — PATIENT MESSAGE (OUTPATIENT)
Dept: CARDIOLOGY | Facility: CLINIC | Age: 73
End: 2018-01-31

## 2018-04-12 ENCOUNTER — PATIENT MESSAGE (OUTPATIENT)
Dept: CARDIOLOGY | Facility: CLINIC | Age: 73
End: 2018-04-12

## 2018-04-12 DIAGNOSIS — E78.5 DYSLIPIDEMIA: ICD-10-CM

## 2018-04-12 DIAGNOSIS — Z95.1 S/P CABG (CORONARY ARTERY BYPASS GRAFT): ICD-10-CM

## 2018-04-12 DIAGNOSIS — I10 ESSENTIAL HYPERTENSION: ICD-10-CM

## 2018-04-12 DIAGNOSIS — Z95.5 S/P CORONARY ARTERY STENT PLACEMENT: Chronic | ICD-10-CM

## 2018-04-12 DIAGNOSIS — I25.10 CORONARY ARTERY DISEASE INVOLVING NATIVE CORONARY ARTERY OF NATIVE HEART WITHOUT ANGINA PECTORIS: Chronic | ICD-10-CM

## 2018-04-12 RX ORDER — CARVEDILOL 25 MG/1
25 TABLET ORAL 2 TIMES DAILY
Qty: 180 TABLET | Refills: 4 | Status: SHIPPED | OUTPATIENT
Start: 2018-04-12 | End: 2019-04-22 | Stop reason: SDUPTHER

## 2018-04-12 RX ORDER — CARVEDILOL 25 MG/1
25 TABLET ORAL 2 TIMES DAILY
Qty: 180 TABLET | Refills: 5 | Status: SHIPPED | OUTPATIENT
Start: 2018-04-12 | End: 2018-10-02

## 2018-07-02 ENCOUNTER — PATIENT MESSAGE (OUTPATIENT)
Dept: CARDIOLOGY | Facility: CLINIC | Age: 73
End: 2018-07-02

## 2018-07-05 ENCOUNTER — PATIENT MESSAGE (OUTPATIENT)
Dept: CARDIOLOGY | Facility: CLINIC | Age: 73
End: 2018-07-05

## 2018-07-05 DIAGNOSIS — I25.10 CORONARY ARTERY DISEASE INVOLVING NATIVE CORONARY ARTERY OF NATIVE HEART WITHOUT ANGINA PECTORIS: Chronic | ICD-10-CM

## 2018-07-05 DIAGNOSIS — I10 ESSENTIAL HYPERTENSION: ICD-10-CM

## 2018-07-05 DIAGNOSIS — Z95.5 S/P CORONARY ARTERY STENT PLACEMENT: Chronic | ICD-10-CM

## 2018-07-05 DIAGNOSIS — E78.5 DYSLIPIDEMIA: ICD-10-CM

## 2018-07-05 RX ORDER — LOSARTAN POTASSIUM 25 MG/1
25 TABLET ORAL EVERY MORNING
Qty: 90 TABLET | Refills: 6 | Status: SHIPPED | OUTPATIENT
Start: 2018-07-05 | End: 2018-07-09 | Stop reason: SDUPTHER

## 2018-07-06 DIAGNOSIS — I25.10 CORONARY ARTERY DISEASE, ANGINA PRESENCE UNSPECIFIED, UNSPECIFIED VESSEL OR LESION TYPE, UNSPECIFIED WHETHER NATIVE OR TRANSPLANTED HEART: Primary | ICD-10-CM

## 2018-07-06 DIAGNOSIS — E78.5 DYSLIPIDEMIA: ICD-10-CM

## 2018-07-07 ENCOUNTER — PATIENT MESSAGE (OUTPATIENT)
Dept: CARDIOLOGY | Facility: CLINIC | Age: 73
End: 2018-07-07

## 2018-07-09 ENCOUNTER — PATIENT MESSAGE (OUTPATIENT)
Dept: CARDIOLOGY | Facility: CLINIC | Age: 73
End: 2018-07-09

## 2018-07-09 ENCOUNTER — LAB VISIT (OUTPATIENT)
Dept: LAB | Facility: HOSPITAL | Age: 73
End: 2018-07-09
Attending: INTERNAL MEDICINE
Payer: MEDICARE

## 2018-07-09 DIAGNOSIS — I10 ESSENTIAL HYPERTENSION: ICD-10-CM

## 2018-07-09 DIAGNOSIS — E78.5 DYSLIPIDEMIA: ICD-10-CM

## 2018-07-09 DIAGNOSIS — Z95.5 S/P CORONARY ARTERY STENT PLACEMENT: Chronic | ICD-10-CM

## 2018-07-09 DIAGNOSIS — I25.10 CORONARY ARTERY DISEASE INVOLVING NATIVE CORONARY ARTERY OF NATIVE HEART WITHOUT ANGINA PECTORIS: Chronic | ICD-10-CM

## 2018-07-09 DIAGNOSIS — I25.10 CORONARY ARTERY DISEASE, ANGINA PRESENCE UNSPECIFIED, UNSPECIFIED VESSEL OR LESION TYPE, UNSPECIFIED WHETHER NATIVE OR TRANSPLANTED HEART: ICD-10-CM

## 2018-07-09 DIAGNOSIS — Z95.1 S/P CABG (CORONARY ARTERY BYPASS GRAFT): ICD-10-CM

## 2018-07-09 LAB
ALBUMIN SERPL BCP-MCNC: 3.8 G/DL
ALP SERPL-CCNC: 50 U/L
ALT SERPL W/O P-5'-P-CCNC: 34 U/L
ANION GAP SERPL CALC-SCNC: 5 MMOL/L
AST SERPL-CCNC: 28 U/L
BILIRUB SERPL-MCNC: 0.6 MG/DL
BUN SERPL-MCNC: 11 MG/DL
CALCIUM SERPL-MCNC: 9.7 MG/DL
CHLORIDE SERPL-SCNC: 105 MMOL/L
CK SERPL-CCNC: 149 U/L
CO2 SERPL-SCNC: 28 MMOL/L
CREAT SERPL-MCNC: 1 MG/DL
EST. GFR  (AFRICAN AMERICAN): >60 ML/MIN/1.73 M^2
EST. GFR  (NON AFRICAN AMERICAN): 56.8 ML/MIN/1.73 M^2
GLUCOSE SERPL-MCNC: 118 MG/DL
POTASSIUM SERPL-SCNC: 4.2 MMOL/L
PROT SERPL-MCNC: 7.1 G/DL
SODIUM SERPL-SCNC: 138 MMOL/L

## 2018-07-09 PROCEDURE — 36415 COLL VENOUS BLD VENIPUNCTURE: CPT | Mod: PO

## 2018-07-09 PROCEDURE — 80053 COMPREHEN METABOLIC PANEL: CPT

## 2018-07-09 PROCEDURE — 82550 ASSAY OF CK (CPK): CPT

## 2018-07-10 ENCOUNTER — PATIENT MESSAGE (OUTPATIENT)
Dept: CARDIOLOGY | Facility: CLINIC | Age: 73
End: 2018-07-10

## 2018-07-10 RX ORDER — LOSARTAN POTASSIUM 25 MG/1
25 TABLET ORAL EVERY MORNING
Qty: 90 TABLET | Refills: 6 | Status: SHIPPED | OUTPATIENT
Start: 2018-07-10 | End: 2019-09-11 | Stop reason: SDUPTHER

## 2018-07-28 DIAGNOSIS — I25.10 CORONARY ARTERY DISEASE INVOLVING NATIVE CORONARY ARTERY OF NATIVE HEART WITHOUT ANGINA PECTORIS: Chronic | ICD-10-CM

## 2018-07-28 DIAGNOSIS — Z95.5 S/P CORONARY ARTERY STENT PLACEMENT: Chronic | ICD-10-CM

## 2018-07-28 DIAGNOSIS — E11.9 TYPE 2 DIABETES MELLITUS WITHOUT COMPLICATION: ICD-10-CM

## 2018-07-28 DIAGNOSIS — I10 ESSENTIAL HYPERTENSION: ICD-10-CM

## 2018-07-28 DIAGNOSIS — E78.5 DYSLIPIDEMIA: ICD-10-CM

## 2018-07-28 RX ORDER — ISOSORBIDE MONONITRATE 60 MG/1
TABLET, EXTENDED RELEASE ORAL
Qty: 90 TABLET | Refills: 4 | Status: SHIPPED | OUTPATIENT
Start: 2018-07-28 | End: 2019-08-04 | Stop reason: SDUPTHER

## 2018-07-30 ENCOUNTER — TELEPHONE (OUTPATIENT)
Dept: GASTROENTEROLOGY | Facility: CLINIC | Age: 73
End: 2018-07-30

## 2018-07-30 NOTE — TELEPHONE ENCOUNTER
----- Message from Mariann Cunningham sent at 7/30/2018 12:54 PM CDT -----  Type: Needs Medical Advice    Who Called: patientr  Symptoms (please be specific):  Na  How long has patient had these symptoms:  valentina  Pharmacy name and phone #: [facility]  Best Call Back Number: [phone] (home)     Additional Information: Would like to schedule colonoscopy, received a recall letter

## 2018-08-02 ENCOUNTER — HOSPITAL ENCOUNTER (OUTPATIENT)
Dept: RADIOLOGY | Facility: HOSPITAL | Age: 73
Discharge: HOME | End: 2018-08-02
Attending: OBSTETRICS & GYNECOLOGY
Payer: MEDICARE

## 2018-08-02 ENCOUNTER — OFFICE VISIT (OUTPATIENT)
Dept: OBSTETRICS AND GYNECOLOGY | Facility: CLINIC | Age: 73
End: 2018-08-02
Payer: MEDICARE

## 2018-08-02 VITALS
WEIGHT: 216 LBS | BODY MASS INDEX: 34.72 KG/M2 | WEIGHT: 216.25 LBS | HEIGHT: 66 IN | HEIGHT: 66 IN | BODY MASS INDEX: 34.75 KG/M2 | DIASTOLIC BLOOD PRESSURE: 90 MMHG | SYSTOLIC BLOOD PRESSURE: 128 MMHG

## 2018-08-02 DIAGNOSIS — Z12.31 SCREENING MAMMOGRAM, ENCOUNTER FOR: ICD-10-CM

## 2018-08-02 DIAGNOSIS — Z12.31 SCREENING MAMMOGRAM, ENCOUNTER FOR: Primary | ICD-10-CM

## 2018-08-02 PROCEDURE — 77063 BREAST TOMOSYNTHESIS BI: CPT | Mod: 26,,, | Performed by: RADIOLOGY

## 2018-08-02 PROCEDURE — G0101 CA SCREEN;PELVIC/BREAST EXAM: HCPCS | Mod: S$GLB,,, | Performed by: OBSTETRICS & GYNECOLOGY

## 2018-08-02 PROCEDURE — 77067 SCR MAMMO BI INCL CAD: CPT | Mod: 26,,, | Performed by: RADIOLOGY

## 2018-08-02 PROCEDURE — 99999 PR PBB SHADOW E&M-EST. PATIENT-LVL IV: CPT | Mod: PBBFAC,,, | Performed by: OBSTETRICS & GYNECOLOGY

## 2018-08-02 PROCEDURE — 77063 BREAST TOMOSYNTHESIS BI: CPT | Mod: TC,PN

## 2018-08-02 NOTE — PROGRESS NOTES
Chief Complaint   Patient presents with    Annual Exam       History and Physical:  No LMP recorded. Patient has had a hysterectomy.       Mayra Pinedo is a 73 y.o.  female who presents today for her routine annual GYN exam. The patient has no Gynecology complaints today. No bowel or bladder complaints. , no Vaginal Bleeding       Allergies:   Review of patient's allergies indicates:   Allergen Reactions    Oxycodone-acetaminophen Hives    Digitalis glycosides Other (See Comments)     Severe joint pain    Digoxin Other (See Comments)     Numbness and painful to move    Statins-hmg-coa reductase inhibitors      Myalgias, alpoecia    Erythromycin Other (See Comments)     Pt reports tachycardia and andres skin to neck and face    Latex, natural rubber Nausea Only and Rash     Nausea at dentist    Penicillins Rash    Sulfa (sulfonamide antibiotics) Rash       Past Medical History:   Diagnosis Date    Anticoagulant long-term use     Plavix and ASA therapy    Arthritis     Asthma     seasonal, allergies    Blood in stool     Blood transfusion     Coronary artery disease     stent X1    Diabetes mellitus     GERD (gastroesophageal reflux disease)     Hypertension        Past Surgical History:   Procedure Laterality Date    ACHILLES TENDON SURGERY Left     torn tendon    CARDIAC SURGERY      x 4--22 years ago    cataract surgery      CHOLECYSTECTOMY      cholescystectomy      COLONOSCOPY  7/20/2006  [facility]    One 1 to 2 mm polyp in the proximal descending colon.  HYPERPLASTIC POLYP.    Internal hemorrhoids.    CORONARY STENT PLACEMENT      2009 X 1; 2014 X 1    HYSTERECTOMY         MEDS:   Current Outpatient Prescriptions on File Prior to Visit   Medication Sig Dispense Refill    ALBUTEROL SULFATE (VENTOLIN HFA INHL) Inhale 2 Inhalers into the lungs as needed.       aspirin (ECOTRIN) 81 MG EC tablet Take 81 mg by mouth once daily.        carvedilol (COREG) 25 MG tablet Take 1 tablet  (25 mg total) by mouth 2 (two) times daily. 180 tablet 5    cetirizine (ZYRTEC) 10 MG tablet Take 1 tablet by mouth every evening.       clopidogrel (PLAVIX) 75 mg tablet TAKE ONE TABLET BY MOUTH ONCE DAILY 90 tablet 4    cyclobenzaprine (FLEXERIL) 10 MG tablet Take 10 mg by mouth 3 (three) times daily as needed for Muscle spasms.      diclofenac sodium 1 % Gel 4 g.      fenofibrate (TRICOR) 54 MG tablet Take 1 tablet (54 mg total) by mouth once daily. 90 tablet 3    isosorbide mononitrate (IMDUR) 60 MG 24 hr tablet TAKE ONE TABLET BY MOUTH IN THE EVENING 90 tablet 4    losartan (COZAAR) 25 MG tablet Take 1 tablet (25 mg total) by mouth every morning. 90 tablet 6    magnesium 200 mg Tab Take by mouth once.      metformin (GLUCOPHAGE-XR) 500 MG 24 hr tablet Take 1 tablet (500 mg total) by mouth once daily. 180 tablet 6    metformin (GLUCOPHAGE-XR) 500 MG 24 hr tablet TAKE ONE TABLET BY MOUTH TWICE DAILY WITH MEALS 180 tablet 6    nitroGLYCERIN (NITROSTAT) 0.4 MG SL tablet Place 1 tablet (0.4 mg total) under the tongue every 5 (five) minutes as needed for Chest pain. 30 tablet 12    omega-3 fatty acids-vitamin E (FISH OIL) 1,000 mg Cap Take 2,400 mg by mouth 2 (two) times daily.       pantoprazole (PROTONIX) 40 MG tablet Take 40 mg by mouth once daily.      pitavastatin calcium (LIVALO) 4 mg Tab Take 1 tablet by mouth once daily. 1/2-1 tab po qpm 90 tablet 4     No current facility-administered medications on file prior to visit.        OB History      Para Term  AB Living    6 6 3          SAB TAB Ectopic Multiple Live Births                       Social History     Social History    Marital status:      Spouse name: N/A    Number of children: N/A    Years of education: N/A     Occupational History    Not on file.     Social History Main Topics    Smoking status: Never Smoker    Smokeless tobacco: Never Used    Alcohol use Yes      Comment: 3 glasses wine daily    Drug use:  "No    Sexual activity: Yes     Partners: Male     Birth control/ protection: Surgical      Comment: hysterectomy      Other Topics Concern    Not on file     Social History Narrative    No narrative on file       Family History   Problem Relation Age of Onset    Hypertension Sister     Heart disease Brother     Hypertension Brother     Coronary artery disease Brother     Obesity Brother     Hypertension Maternal Grandmother     Heart disease Maternal Grandmother     Heart attack Maternal Grandmother     Hypertension Maternal Grandfather     Heart disease Maternal Grandfather     Breast cancer Neg Hx     Ovarian cancer Neg Hx          Past medical and surgical history reviewed.   I have reviewed the patient's medical history in detail and updated the computerized patient record.        Review of System:   General: no chills, fever, night sweats, weight gain or weight loss  Psychological: no depression or suicidal ideation  Breasts: no new or changing breast lumps, nipple discharge or masses.  Respiratory: no cough, shortness of breath, or wheezing  Cardiovascular: no chest pain or dyspnea on exertion  Gastrointestinal: no abdominal pain, change in bowel habits, or black or bloody stools  Genito-Urinary: no incontinence, urinary frequency/urgency or vulvar/vaginal symptoms, pelvic pain or abnormal vaginal bleeding.  Musculoskeletal: no gait disturbance or muscular weakness      Physical Exam:   BP (!) 128/90   Ht 5' 6" (1.676 m)   Wt 98.1 kg (216 lb 4.3 oz)   BMI 34.91 kg/m²   Constitutional: She is oriented to person, place, and time. She appears well-developed and well-nourished. No distress. overweight  HENT:   Head: Normocephalic and atraumatic.   Eyes: Conjunctivae and EOM are normal. No scleral icterus.   Neck: Normal range of motion. Neck supple. No tracheal deviation present.   Cardiovascular: Normal rate.    Pulmonary/Chest: Effort normal. No respiratory distress. She exhibits no " tenderness.  Breasts: are symmetrical.   Right breast exhibits no inverted nipple, no mass, no nipple discharge, no skin change and no tenderness.   Left breast exhibits no inverted nipple, no mass, no nipple discharge, no skin change and no tenderness.  Abdominal: Soft. She exhibits no distension and no mass. There is no tenderness. Large nonincarcerated ventral hernia, nontender.  There is no rebound and no guarding.   Genitourinary:    External rectal exam shows no thrombosed external hemorrhoids.    Pelvic exam was performed with patient supine.   No labial fusion.   There is no rash, lesion or injury on the right labia.   There is no rash, lesion or injury on the left labia.   No bleeding and no signs of injury around the vaginal introitus, urethra is without lesions and well supported.    No vaginal discharge found.    No significant Cystocele, Enterocele or rectocele, and cuff well supported.   Bimanual exam:   The urethra and vagina are without palpable masses or tenderness.   Uterus and cervix are surgically absents, vaginal cuff is intact and well supported.   Right adnexum displays no mass and no tenderness.   Left adnexum displays no mass and no tenderness.  Musculoskeletal: Normal range of motion.   Lymphadenopathy: No inguinal adenopathy present.   Neurological: She is alert and oriented to person, place, and time. Coordination normal.   Skin: Skin is warm and dry. She is not diaphoretic.   Psychiatric: She has a normal mood and affect.        Assessment:   Normal annual GYN exam  1. Screening mammogram, encounter for  CANCELED: Mammo Digital Screening Bilat With CAD   normal pelvic exam    Plan:   PAP not needed - s.p hysterectomy  Mammogram   Follow up in 2 year.

## 2018-08-03 ENCOUNTER — PATIENT MESSAGE (OUTPATIENT)
Dept: CARDIOLOGY | Facility: CLINIC | Age: 73
End: 2018-08-03

## 2018-08-08 ENCOUNTER — PATIENT MESSAGE (OUTPATIENT)
Dept: CARDIOLOGY | Facility: CLINIC | Age: 73
End: 2018-08-08

## 2018-09-12 RX ORDER — PITAVASTATIN CALCIUM 4.18 MG/1
TABLET, FILM COATED ORAL
Qty: 30 TABLET | Refills: 4 | Status: SHIPPED | OUTPATIENT
Start: 2018-09-12 | End: 2018-10-02

## 2018-09-13 ENCOUNTER — CLINICAL SUPPORT (OUTPATIENT)
Dept: CARDIOLOGY | Facility: CLINIC | Age: 73
End: 2018-09-13
Attending: INTERNAL MEDICINE
Payer: MEDICARE

## 2018-09-13 DIAGNOSIS — Z95.1 S/P CABG (CORONARY ARTERY BYPASS GRAFT): ICD-10-CM

## 2018-09-13 DIAGNOSIS — E78.5 DYSLIPIDEMIA: ICD-10-CM

## 2018-09-13 DIAGNOSIS — I25.10 CORONARY ARTERY DISEASE INVOLVING NATIVE CORONARY ARTERY OF NATIVE HEART WITHOUT ANGINA PECTORIS: ICD-10-CM

## 2018-09-13 DIAGNOSIS — Z95.5 S/P CORONARY ARTERY STENT PLACEMENT: ICD-10-CM

## 2018-09-13 PROCEDURE — 93880 EXTRACRANIAL BILAT STUDY: CPT | Mod: PBBFAC,PO | Performed by: INTERNAL MEDICINE

## 2018-09-14 LAB
LEFT ARM DIASTOLIC BLOOD PRESSURE: 60 MMHG
LEFT ARM SYSTOLIC BLOOD PRESSURE: 120 MMHG
LEFT CBA DIAS: 21 CM/S
LEFT CBA SYS: 107 CM/S
LEFT CCA DIST DIAS: 12 CM/S
LEFT CCA DIST SYS: 52 CM/S
LEFT CCA MID DIAS: 10 CM/S
LEFT CCA MID SYS: 64 CM/S
LEFT CCA PROX DIAS: 7 CM/S
LEFT CCA PROX SYS: 74 CM/S
LEFT ECA DIAS: 0 CM/S
LEFT ECA SYS: 94 CM/S
LEFT ICA DIST DIAS: 21 CM/S
LEFT ICA DIST SYS: 88 CM/S
LEFT ICA MID DIAS: 14 CM/S
LEFT ICA MID SYS: 69 CM/S
LEFT ICA PROX DIAS: 21 CM/S
LEFT ICA PROX SYS: 83 CM/S
LEFT VERTEBRAL DIAS: 13 CM/S
LEFT VERTEBRAL SYS: 49 CM/S
OHS CV CAROTID ULTRASOUND LEFT ICA/CCA RATIO: 1.19
OHS CV CAROTID ULTRASOUND RIGHT ICA/CCA RATIO: 0.99
RIGHT ARM DIASTOLIC BLOOD PRESSURE: 60 MMHG
RIGHT ARM SYSTOLIC BLOOD PRESSURE: 130 MMHG
RIGHT CBA DIAS: 10 CM/S
RIGHT CBA SYS: 65 CM/S
RIGHT CCA DIST DIAS: 11 CM/S
RIGHT CCA DIST SYS: 68 CM/S
RIGHT CCA MID DIAS: 6 CM/S
RIGHT CCA MID SYS: 53 CM/S
RIGHT CCA PROX DIAS: 7 CM/S
RIGHT CCA PROX SYS: 56 CM/S
RIGHT ECA DIAS: 0 CM/S
RIGHT ECA SYS: 80 CM/S
RIGHT ICA DIST DIAS: 17 CM/S
RIGHT ICA DIST SYS: 67 CM/S
RIGHT ICA MID DIAS: 13 CM/S
RIGHT ICA MID SYS: 58 CM/S
RIGHT ICA PROX DIAS: 14 CM/S
RIGHT ICA PROX SYS: 58 CM/S
RIGHT VERTEBRAL DIAS: 9 CM/S
RIGHT VERTEBRAL SYS: 50 CM/S

## 2018-09-27 ENCOUNTER — PATIENT MESSAGE (OUTPATIENT)
Dept: CARDIOLOGY | Facility: CLINIC | Age: 73
End: 2018-09-27

## 2018-10-02 ENCOUNTER — OFFICE VISIT (OUTPATIENT)
Dept: CARDIOLOGY | Facility: CLINIC | Age: 73
End: 2018-10-02
Payer: MEDICARE

## 2018-10-02 VITALS
DIASTOLIC BLOOD PRESSURE: 69 MMHG | BODY MASS INDEX: 34.65 KG/M2 | HEIGHT: 66 IN | SYSTOLIC BLOOD PRESSURE: 140 MMHG | WEIGHT: 215.63 LBS | HEART RATE: 63 BPM

## 2018-10-02 DIAGNOSIS — Z95.5 S/P CORONARY ARTERY STENT PLACEMENT: Primary | Chronic | ICD-10-CM

## 2018-10-02 DIAGNOSIS — E78.5 DYSLIPIDEMIA: ICD-10-CM

## 2018-10-02 DIAGNOSIS — I10 ESSENTIAL HYPERTENSION: ICD-10-CM

## 2018-10-02 DIAGNOSIS — Z78.9 STATIN INTOLERANCE: ICD-10-CM

## 2018-10-02 DIAGNOSIS — Z95.1 S/P CABG (CORONARY ARTERY BYPASS GRAFT): ICD-10-CM

## 2018-10-02 DIAGNOSIS — I47.10 SVT (SUPRAVENTRICULAR TACHYCARDIA): ICD-10-CM

## 2018-10-02 PROCEDURE — 3077F SYST BP >= 140 MM HG: CPT | Mod: CPTII,,, | Performed by: INTERNAL MEDICINE

## 2018-10-02 PROCEDURE — 99214 OFFICE O/P EST MOD 30 MIN: CPT | Mod: S$PBB,,, | Performed by: INTERNAL MEDICINE

## 2018-10-02 PROCEDURE — 99213 OFFICE O/P EST LOW 20 MIN: CPT | Mod: PBBFAC,PO | Performed by: INTERNAL MEDICINE

## 2018-10-02 PROCEDURE — 3078F DIAST BP <80 MM HG: CPT | Mod: CPTII,,, | Performed by: INTERNAL MEDICINE

## 2018-10-02 PROCEDURE — 99999 PR PBB SHADOW E&M-EST. PATIENT-LVL III: CPT | Mod: PBBFAC,,, | Performed by: INTERNAL MEDICINE

## 2018-10-02 PROCEDURE — 1101F PT FALLS ASSESS-DOCD LE1/YR: CPT | Mod: CPTII,,, | Performed by: INTERNAL MEDICINE

## 2018-10-02 NOTE — PROGRESS NOTES
Subjective:    Patient ID:  Mayra Pinedo is a 73 y.o. female who presents for follow-up of No chief complaint on file.      HPI  Here for follow up of CABG- PCI (NICOLE 7/16)/atrial tach s/p RFA (6/17). Patients states is doing well no chest pain, SOB or change in exertional tolerence. Patient does not exercise but remains very active with out change in exertional tolerance or chest pain. Patient denies palpitations, syncope, presyncope, lightheadedness or dizziness.  C/o excessive bruising.         Review of Systems   Constitution: Negative for malaise/fatigue.   Eyes: Negative for blurred vision.   Cardiovascular: Negative for chest pain, claudication, cyanosis, dyspnea on exertion, irregular heartbeat, leg swelling, near-syncope, orthopnea, palpitations, paroxysmal nocturnal dyspnea and syncope.   Respiratory: Negative for cough and shortness of breath.    Hematologic/Lymphatic: Does not bruise/bleed easily.   Musculoskeletal: Negative for back pain, falls, joint pain, muscle cramps, muscle weakness and myalgias.   Gastrointestinal: Negative for abdominal pain, change in bowel habit, nausea and vomiting.   Genitourinary: Negative for urgency.   Neurological: Negative for dizziness, focal weakness and light-headedness.        Objective:            Physical Exam   Constitutional: She is oriented to person, place, and time. She appears well-developed and well-nourished.   HENT:   Head: Normocephalic.   Eyes: Conjunctivae are normal.   Neck: Normal range of motion. Neck supple. No JVD present.   Cardiovascular: Normal rate, regular rhythm, normal heart sounds and intact distal pulses.   Pulses:       Carotid pulses are 2+ on the right side, and 2+ on the left side.       Radial pulses are 2+ on the right side, and 2+ on the left side.        Dorsalis pedis pulses are 2+ on the right side, and 2+ on the left side.        Posterior tibial pulses are 2+ on the right side, and 2+ on the left side.   Well healed midline  sternal incision.     Pulmonary/Chest: Effort normal and breath sounds normal.   Abdominal: Soft. Bowel sounds are normal.   Musculoskeletal: She exhibits no edema or tenderness.   Neurological: She is alert and oriented to person, place, and time. Gait normal.   Skin: Skin is warm, dry and intact. No cyanosis. Nails show no clubbing.   Psychiatric: She has a normal mood and affect. Her speech is normal and behavior is normal. Thought content normal.       ..    Chemistry        Component Value Date/Time     07/09/2018 0953    K 4.2 07/09/2018 0953     07/09/2018 0953    CO2 28 07/09/2018 0953    BUN 11 07/09/2018 0953    CREATININE 1.0 07/09/2018 0953     (H) 07/09/2018 0953        Component Value Date/Time    CALCIUM 9.7 07/09/2018 0953    ALKPHOS 50 (L) 07/09/2018 0953    AST 28 07/09/2018 0953    ALT 34 07/09/2018 0953    BILITOT 0.6 07/09/2018 0953    ESTGFRAFRICA >60.0 07/09/2018 0953    EGFRNONAA 56.8 (A) 07/09/2018 0953            ..  Lab Results   Component Value Date    CHOL 126 11/15/2017    CHOL 134 06/21/2016     Lab Results   Component Value Date    HDL 33 (L) 11/15/2017    HDL 34 (L) 06/21/2016     Lab Results   Component Value Date    LDLCALC 37.8 (L) 11/15/2017    LDLCALC 59.8 (L) 06/21/2016     Lab Results   Component Value Date    TRIG 276 (H) 11/15/2017    TRIG 201 (H) 06/21/2016     Lab Results   Component Value Date    CHOLHDL 26.2 11/15/2017    CHOLHDL 25.4 06/21/2016     ..  Lab Results   Component Value Date    WBC 5.08 11/15/2017    HGB 12.1 11/15/2017    HCT 36.5 (L) 11/15/2017    MCV 95 11/15/2017     11/15/2017       Test(s) Reviewed  I have reviewed the following in detail:  [] Stress test   [] Angiography   [x] Echocardiogram   [] Labs   [] Other:       Assessment:         ICD-10-CM ICD-9-CM   1. S/P coronary artery stent placement Z95.5 V45.82   2. S/P CABG (coronary artery bypass graft) x 3 1991 Z95.1 V45.81   3. Statin intolerance Z78.9 995.27   4. SVT  (supraventricular tachycardia) I47.1 427.89   5. Dyslipidemia E78.5 272.4   6. Essential hypertension I10 401.9     Problem List Items Addressed This Visit     Dyslipidemia    Essential hypertension    S/P CABG (coronary artery bypass graft) x 3 1991    Overview     S/P CABG  x 3 1991 LIMA-LAD, VG-D (occluded), VG-RCA         S/P coronary artery stent placement - Primary (Chronic)    Overview     7/2016 PDA- 2.25 x 24, synergy    8/15 lad/diag promus 2.5x16    8/2009 vg-rca- promus 3.5x8         Statin intolerance    Overview     Failed prava/lipitor/crestor. Tolerates livalo         SVT (supraventricular tachycardia)    Overview     6/2017 EPS  1.  Normal baseline intervals.  2.  No evidence of an accessory pathway.  3.  Atrial tachycardia mapped to the posterior aspect near the IVC, status post radiofrequency ablation.                Plan:           Return to clinic 9 months   Low level/low impact aerobic exercise 5x's/wk. Heart healthy diet and risk factor modification.    See labs and med orders.  Cbc, lipid this week

## 2018-10-03 ENCOUNTER — LAB VISIT (OUTPATIENT)
Dept: LAB | Facility: HOSPITAL | Age: 73
End: 2018-10-03
Attending: INTERNAL MEDICINE
Payer: MEDICARE

## 2018-10-03 DIAGNOSIS — E78.5 DYSLIPIDEMIA: ICD-10-CM

## 2018-10-03 DIAGNOSIS — Z95.5 S/P CORONARY ARTERY STENT PLACEMENT: Chronic | ICD-10-CM

## 2018-10-03 LAB
BASOPHILS # BLD AUTO: 0.05 K/UL
BASOPHILS NFR BLD: 1.1 %
CHOLEST SERPL-MCNC: 120 MG/DL
CHOLEST/HDLC SERPL: 3.2 {RATIO}
DIFFERENTIAL METHOD: ABNORMAL
EOSINOPHIL # BLD AUTO: 0.1 K/UL
EOSINOPHIL NFR BLD: 2.8 %
ERYTHROCYTE [DISTWIDTH] IN BLOOD BY AUTOMATED COUNT: 13.6 %
HCT VFR BLD AUTO: 37.6 %
HDLC SERPL-MCNC: 38 MG/DL
HDLC SERPL: 31.7 %
HGB BLD-MCNC: 12.2 G/DL
IMM GRANULOCYTES # BLD AUTO: 0.01 K/UL
IMM GRANULOCYTES NFR BLD AUTO: 0.2 %
LDLC SERPL CALC-MCNC: 51.2 MG/DL
LYMPHOCYTES # BLD AUTO: 1.2 K/UL
LYMPHOCYTES NFR BLD: 26.7 %
MCH RBC QN AUTO: 31.7 PG
MCHC RBC AUTO-ENTMCNC: 32.4 G/DL
MCV RBC AUTO: 98 FL
MONOCYTES # BLD AUTO: 0.5 K/UL
MONOCYTES NFR BLD: 10.7 %
NEUTROPHILS # BLD AUTO: 2.7 K/UL
NEUTROPHILS NFR BLD: 58.5 %
NONHDLC SERPL-MCNC: 82 MG/DL
NRBC BLD-RTO: 0 /100 WBC
PLATELET # BLD AUTO: 167 K/UL
PMV BLD AUTO: 11.5 FL
RBC # BLD AUTO: 3.85 M/UL
TRIGL SERPL-MCNC: 154 MG/DL
WBC # BLD AUTO: 4.6 K/UL

## 2018-10-03 PROCEDURE — 80061 LIPID PANEL: CPT

## 2018-10-03 PROCEDURE — 36415 COLL VENOUS BLD VENIPUNCTURE: CPT | Mod: PO

## 2018-10-03 PROCEDURE — 85025 COMPLETE CBC W/AUTO DIFF WBC: CPT

## 2018-10-11 DIAGNOSIS — E11.9 TYPE 2 DIABETES MELLITUS WITHOUT COMPLICATION: ICD-10-CM

## 2018-10-11 DIAGNOSIS — Z95.5 S/P CORONARY ARTERY STENT PLACEMENT: Chronic | ICD-10-CM

## 2018-10-11 DIAGNOSIS — I10 ESSENTIAL HYPERTENSION: ICD-10-CM

## 2018-10-11 DIAGNOSIS — E78.5 DYSLIPIDEMIA: ICD-10-CM

## 2018-10-11 DIAGNOSIS — I25.10 CORONARY ARTERY DISEASE INVOLVING NATIVE CORONARY ARTERY OF NATIVE HEART WITHOUT ANGINA PECTORIS: Chronic | ICD-10-CM

## 2018-10-11 RX ORDER — METFORMIN HYDROCHLORIDE 500 MG/1
TABLET, EXTENDED RELEASE ORAL
Qty: 180 TABLET | Refills: 4 | Status: SHIPPED | OUTPATIENT
Start: 2018-10-11 | End: 2020-09-26

## 2019-01-25 DIAGNOSIS — I25.10 CORONARY ARTERY DISEASE INVOLVING NATIVE CORONARY ARTERY OF NATIVE HEART WITHOUT ANGINA PECTORIS: Chronic | ICD-10-CM

## 2019-01-25 DIAGNOSIS — E78.5 DYSLIPIDEMIA: ICD-10-CM

## 2019-01-25 DIAGNOSIS — I10 ESSENTIAL HYPERTENSION: ICD-10-CM

## 2019-01-25 DIAGNOSIS — Z95.5 S/P CORONARY ARTERY STENT PLACEMENT: Chronic | ICD-10-CM

## 2019-01-25 DIAGNOSIS — E11.9 TYPE 2 DIABETES MELLITUS WITHOUT COMPLICATION: ICD-10-CM

## 2019-01-25 RX ORDER — CLOPIDOGREL BISULFATE 75 MG/1
TABLET ORAL
Qty: 90 TABLET | Refills: 4 | Status: SHIPPED | OUTPATIENT
Start: 2019-01-25 | End: 2020-02-04

## 2019-02-04 RX ORDER — FENOFIBRATE 54 MG/1
TABLET ORAL
Qty: 90 TABLET | Refills: 4 | Status: SHIPPED | OUTPATIENT
Start: 2019-02-04 | End: 2020-02-14

## 2019-04-22 DIAGNOSIS — E78.5 DYSLIPIDEMIA: ICD-10-CM

## 2019-04-22 DIAGNOSIS — Z95.1 S/P CABG (CORONARY ARTERY BYPASS GRAFT): ICD-10-CM

## 2019-04-22 DIAGNOSIS — I25.10 CORONARY ARTERY DISEASE INVOLVING NATIVE CORONARY ARTERY OF NATIVE HEART WITHOUT ANGINA PECTORIS: Chronic | ICD-10-CM

## 2019-04-22 DIAGNOSIS — Z95.5 S/P CORONARY ARTERY STENT PLACEMENT: Chronic | ICD-10-CM

## 2019-04-22 DIAGNOSIS — I10 ESSENTIAL HYPERTENSION: ICD-10-CM

## 2019-04-22 RX ORDER — CARVEDILOL 25 MG/1
TABLET ORAL
Qty: 180 TABLET | Refills: 4 | Status: SHIPPED | OUTPATIENT
Start: 2019-04-22 | End: 2020-07-29

## 2019-05-14 ENCOUNTER — PATIENT MESSAGE (OUTPATIENT)
Dept: CARDIOLOGY | Facility: CLINIC | Age: 74
End: 2019-05-14

## 2019-05-14 ENCOUNTER — TELEPHONE (OUTPATIENT)
Dept: UROGYNECOLOGY | Facility: CLINIC | Age: 74
End: 2019-05-14

## 2019-05-14 NOTE — TELEPHONE ENCOUNTER
----- Message from RT Niesha sent at 5/14/2019 10:52 AM CDT -----  Contact: pt    pt , requesting a call back soon she does not know why she received the f/u appt notice in the mail, thanks.

## 2019-05-16 ENCOUNTER — OFFICE VISIT (OUTPATIENT)
Dept: DERMATOLOGY | Facility: CLINIC | Age: 74
End: 2019-05-16
Payer: MEDICARE

## 2019-05-16 DIAGNOSIS — L82.1 SEBORRHEIC KERATOSES: ICD-10-CM

## 2019-05-16 DIAGNOSIS — D22.9 MULTIPLE BENIGN NEVI: ICD-10-CM

## 2019-05-16 DIAGNOSIS — L57.0 AK (ACTINIC KERATOSIS): ICD-10-CM

## 2019-05-16 DIAGNOSIS — D48.9 NEOPLASM OF UNCERTAIN BEHAVIOR: Primary | ICD-10-CM

## 2019-05-16 DIAGNOSIS — Z12.83 SCREENING EXAM FOR SKIN CANCER: ICD-10-CM

## 2019-05-16 PROCEDURE — 99999 PR PBB SHADOW E&M-EST. PATIENT-LVL II: CPT | Mod: PBBFAC,,, | Performed by: DERMATOLOGY

## 2019-05-16 PROCEDURE — 1101F PT FALLS ASSESS-DOCD LE1/YR: CPT | Mod: CPTII,S$GLB,, | Performed by: DERMATOLOGY

## 2019-05-16 PROCEDURE — 99999 PR PBB SHADOW E&M-EST. PATIENT-LVL II: ICD-10-PCS | Mod: PBBFAC,,, | Performed by: DERMATOLOGY

## 2019-05-16 PROCEDURE — 17000 DESTRUCT PREMALG LESION: CPT | Mod: 59,S$GLB,, | Performed by: DERMATOLOGY

## 2019-05-16 PROCEDURE — 17000 PR DESTRUCTION(LASER SURGERY,CRYOSURGERY,CHEMOSURGERY),PREMALIGNANT LESIONS,FIRST LESION: ICD-10-PCS | Mod: 59,S$GLB,, | Performed by: DERMATOLOGY

## 2019-05-16 PROCEDURE — 88305 TISSUE SPECIMEN TO PATHOLOGY, DERMATOLOGY: ICD-10-PCS | Mod: 26,,, | Performed by: PATHOLOGY

## 2019-05-16 PROCEDURE — 88305 TISSUE EXAM BY PATHOLOGIST: CPT | Mod: 26,,, | Performed by: PATHOLOGY

## 2019-05-16 PROCEDURE — 99203 PR OFFICE/OUTPT VISIT, NEW, LEVL III, 30-44 MIN: ICD-10-PCS | Mod: 25,S$GLB,, | Performed by: DERMATOLOGY

## 2019-05-16 PROCEDURE — 99203 OFFICE O/P NEW LOW 30 MIN: CPT | Mod: 25,S$GLB,, | Performed by: DERMATOLOGY

## 2019-05-16 PROCEDURE — 88305 TISSUE EXAM BY PATHOLOGIST: CPT | Performed by: PATHOLOGY

## 2019-05-16 PROCEDURE — 1101F PR PT FALLS ASSESS DOC 0-1 FALLS W/OUT INJ PAST YR: ICD-10-PCS | Mod: CPTII,S$GLB,, | Performed by: DERMATOLOGY

## 2019-05-16 PROCEDURE — 11102 PR TANGENTIAL BIOPSY, SKIN, SINGLE LESION: ICD-10-PCS | Mod: S$GLB,,, | Performed by: DERMATOLOGY

## 2019-05-16 PROCEDURE — 88342 IMHCHEM/IMCYTCHM 1ST ANTB: CPT | Mod: 26,,, | Performed by: PATHOLOGY

## 2019-05-16 PROCEDURE — 88342 TISSUE SPECIMEN TO PATHOLOGY, DERMATOLOGY: ICD-10-PCS | Mod: 26,,, | Performed by: PATHOLOGY

## 2019-05-16 PROCEDURE — 11102 TANGNTL BX SKIN SINGLE LES: CPT | Mod: S$GLB,,, | Performed by: DERMATOLOGY

## 2019-05-16 NOTE — PROGRESS NOTES
Subjective:       Patient ID:  Mayra Pinedo is a 73 y.o. female who presents for No chief complaint on file.    73 year old female with no history of skin cancer presents for lesion of concern on left temple x years. The patient denies any change, including change in color, increase in size, or spontaneous bleeding, associated with this lesion.  Intermittent pruritus. No tx.     Denies history of NMSC  Denies family history of melanoma    Past Medical History:  No date: Anticoagulant long-term use      Comment:  Plavix and ASA therapy  No date: Arthritis  No date: Asthma      Comment:  seasonal, allergies  No date: Blood in stool  No date: Blood transfusion  No date: Coronary artery disease      Comment:  stent X1  No date: Diabetes mellitus  No date: GERD (gastroesophageal reflux disease)  No date: Hypertension        Review of Systems   Skin: Positive for activity-related sunscreen use. Negative for daily sunscreen use and recent sunburn.   Hematologic/Lymphatic: Bruises/bleeds easily.        Objective:    Physical Exam   Constitutional: She appears well-developed and well-nourished. No distress.   Neurological: She is alert and oriented to person, place, and time. She is not disoriented.   Psychiatric: She has a normal mood and affect.   Skin:   Areas Examined (abnormalities noted in diagram):   Head / Face Inspection Performed  Neck Inspection Performed  Chest / Axilla Inspection Performed  Back Inspection Performed  RUE Inspected  LUE Inspection Performed                   Diagram Legend     Erythematous scaling macule/papule c/w actinic keratosis       Vascular papule c/w angioma      Pigmented verrucoid papule/plaque c/w seborrheic keratosis      Yellow umbilicated papule c/w sebaceous hyperplasia      Irregularly shaped tan macule c/w lentigo     1-2 mm smooth white papules consistent with Milia      Movable subcutaneous cyst with punctum c/w epidermal inclusion cyst      Subcutaneous movable cyst c/w  pilar cyst      Firm pink to brown papule c/w dermatofibroma      Pedunculated fleshy papule(s) c/w skin tag(s)      Evenly pigmented macule c/w junctional nevus     Mildly variegated pigmented, slightly irregular-bordered macule c/w mildly atypical nevus      Flesh colored to evenly pigmented papule c/w intradermal nevus       Pink pearly papule/plaque c/w basal cell carcinoma      Erythematous hyperkeratotic cursted plaque c/w SCC      Surgical scar with no sign of skin cancer recurrence      Open and closed comedones      Inflammatory papules and pustules      Verrucoid papule consistent consistent with wart     Erythematous eczematous patches and plaques     Dystrophic onycholytic nail with subungual debris c/w onychomycosis     Umbilicated papule    Erythematous-base heme-crusted tan verrucoid plaque consistent with inflamed seborrheic keratosis     Erythematous Silvery Scaling Plaque c/w Psoriasis     See annotation              Assessment / Plan:      Pathology Orders:     Normal Orders This Visit    Tissue Specimen To Pathology, Dermatology     Questions:    Directional Terms:  Other(comment)    Clinical Information:  lentigo r/o atypia    Specific Site:  left mid back        Neoplasm of uncertain behavior  -     Tissue Specimen To Pathology, Dermatology    Shave biopsy procedure note:    Shave biopsy performed after verbal consent including risk of infection, scar, recurrence, need for additional treatment of site. Area prepped with alcohol, anesthetized with approximately 1.0cc of 1% lidocaine with epinephrine. Lesional tissue shaved with razor blade. Hemostasis achieved with application of aluminum chloride followed by hyfrecation. No complications. Dressing applied. Wound care explained.        AK (actinic keratosis)  Premalignant nature discussed     Cryosurgery Procedure Note    Verbal consent from the patient is obtained including, but not limited to, risk of hypopigmentation/hyperpigmentation, scar,  recurrence of lesion. The patient is aware of the precancerous quality and need for treatment of these lesions. Liquid nitrogen cryosurgery is applied to the 1 actinic keratoses, as detailed in the physical exam, to produce a freeze injury. The patient is aware that blisters may form and is instructed on wound care with gentle cleansing and use of vaseline ointment to keep moist until healed. The patient is supplied a handout on cryosurgery and is instructed to call if lesions do not completely resolve.      Seborrheic keratoses  These are benign inherited growths without a malignant potential. Reassurance given to patient. No treatment is necessary.     Multiple benign nevi  upper body skin examination performed today including at least 6 points as noted in physical examination.  Reassurance provided.  Instructed patient to observe lesion(s) for changes and follow up in clinic if changes are noted. Discussed ABCDE's of moles and brochure provided.      Screening exam for skin cancer  Upper body skin examination performed today including at least 6 points as noted in physical examination. Suspicious lesions noted.               Follow up in about 1 year (around 5/16/2020).     ADDENDUM:  Skin, left midback, shave biopsy:  -MELANOCYTIC NEVUS, COMPOUND TYPE, IRRITATED  Diagnosed by: Jun Albert    Benign. No further treatment is needed.

## 2019-05-20 ENCOUNTER — PATIENT MESSAGE (OUTPATIENT)
Dept: DERMATOLOGY | Facility: CLINIC | Age: 74
End: 2019-05-20

## 2019-07-12 RX ORDER — PITAVASTATIN CALCIUM 4.18 MG/1
TABLET, FILM COATED ORAL
Qty: 30 TABLET | Refills: 4 | Status: SHIPPED | OUTPATIENT
Start: 2019-07-12 | End: 2020-04-27

## 2019-07-17 ENCOUNTER — LAB VISIT (OUTPATIENT)
Dept: LAB | Facility: HOSPITAL | Age: 74
End: 2019-07-17
Attending: INTERNAL MEDICINE
Payer: MEDICARE

## 2019-07-17 ENCOUNTER — CLINICAL SUPPORT (OUTPATIENT)
Dept: CARDIOLOGY | Facility: CLINIC | Age: 74
End: 2019-07-17
Attending: INTERNAL MEDICINE
Payer: MEDICARE

## 2019-07-17 VITALS
DIASTOLIC BLOOD PRESSURE: 70 MMHG | WEIGHT: 215 LBS | BODY MASS INDEX: 34.55 KG/M2 | SYSTOLIC BLOOD PRESSURE: 140 MMHG | HEIGHT: 66 IN

## 2019-07-17 DIAGNOSIS — Z95.5 S/P CORONARY ARTERY STENT PLACEMENT: ICD-10-CM

## 2019-07-17 DIAGNOSIS — Z78.9 STATIN INTOLERANCE: ICD-10-CM

## 2019-07-17 DIAGNOSIS — I47.10 SVT (SUPRAVENTRICULAR TACHYCARDIA): ICD-10-CM

## 2019-07-17 DIAGNOSIS — Z95.1 S/P CABG (CORONARY ARTERY BYPASS GRAFT): ICD-10-CM

## 2019-07-17 DIAGNOSIS — E78.5 DYSLIPIDEMIA: ICD-10-CM

## 2019-07-17 DIAGNOSIS — Z95.5 S/P CORONARY ARTERY STENT PLACEMENT: Chronic | ICD-10-CM

## 2019-07-17 LAB
ALBUMIN SERPL BCP-MCNC: 3.7 G/DL (ref 3.5–5.2)
ALP SERPL-CCNC: 41 U/L (ref 55–135)
ALT SERPL W/O P-5'-P-CCNC: 24 U/L (ref 10–44)
ANION GAP SERPL CALC-SCNC: 11 MMOL/L (ref 8–16)
AST SERPL-CCNC: 21 U/L (ref 10–40)
BASOPHILS # BLD AUTO: 0.04 K/UL (ref 0–0.2)
BASOPHILS NFR BLD: 0.9 % (ref 0–1.9)
BILIRUB SERPL-MCNC: 0.6 MG/DL (ref 0.1–1)
BUN SERPL-MCNC: 10 MG/DL (ref 8–23)
CALCIUM SERPL-MCNC: 9.8 MG/DL (ref 8.7–10.5)
CHLORIDE SERPL-SCNC: 104 MMOL/L (ref 95–110)
CHOLEST SERPL-MCNC: 129 MG/DL (ref 120–199)
CHOLEST/HDLC SERPL: 4.6 {RATIO} (ref 2–5)
CO2 SERPL-SCNC: 25 MMOL/L (ref 23–29)
CREAT SERPL-MCNC: 1 MG/DL (ref 0.5–1.4)
DIFFERENTIAL METHOD: ABNORMAL
EOSINOPHIL # BLD AUTO: 0.1 K/UL (ref 0–0.5)
EOSINOPHIL NFR BLD: 3 % (ref 0–8)
ERYTHROCYTE [DISTWIDTH] IN BLOOD BY AUTOMATED COUNT: 13.9 % (ref 11.5–14.5)
EST. GFR  (AFRICAN AMERICAN): >60 ML/MIN/1.73 M^2
EST. GFR  (NON AFRICAN AMERICAN): 56.4 ML/MIN/1.73 M^2
GLUCOSE SERPL-MCNC: 126 MG/DL (ref 70–110)
HCT VFR BLD AUTO: 40.2 % (ref 37–48.5)
HDLC SERPL-MCNC: 28 MG/DL (ref 40–75)
HDLC SERPL: 21.7 % (ref 20–50)
HGB BLD-MCNC: 12.7 G/DL (ref 12–16)
IMM GRANULOCYTES # BLD AUTO: 0.03 K/UL (ref 0–0.04)
IMM GRANULOCYTES NFR BLD AUTO: 0.6 % (ref 0–0.5)
LDLC SERPL CALC-MCNC: 46 MG/DL (ref 63–159)
LYMPHOCYTES # BLD AUTO: 1.3 K/UL (ref 1–4.8)
LYMPHOCYTES NFR BLD: 27 % (ref 18–48)
MCH RBC QN AUTO: 31.5 PG (ref 27–31)
MCHC RBC AUTO-ENTMCNC: 31.6 G/DL (ref 32–36)
MCV RBC AUTO: 100 FL (ref 82–98)
MONOCYTES # BLD AUTO: 0.5 K/UL (ref 0.3–1)
MONOCYTES NFR BLD: 10.5 % (ref 4–15)
NEUTROPHILS # BLD AUTO: 2.7 K/UL (ref 1.8–7.7)
NEUTROPHILS NFR BLD: 58 % (ref 38–73)
NONHDLC SERPL-MCNC: 101 MG/DL
NRBC BLD-RTO: 0 /100 WBC
PLATELET # BLD AUTO: 184 K/UL (ref 150–350)
PMV BLD AUTO: 11 FL (ref 9.2–12.9)
POTASSIUM SERPL-SCNC: 4.1 MMOL/L (ref 3.5–5.1)
PROT SERPL-MCNC: 7 G/DL (ref 6–8.4)
RBC # BLD AUTO: 4.03 M/UL (ref 4–5.4)
SODIUM SERPL-SCNC: 140 MMOL/L (ref 136–145)
TRIGL SERPL-MCNC: 275 MG/DL (ref 30–150)
WBC # BLD AUTO: 4.66 K/UL (ref 3.9–12.7)

## 2019-07-17 PROCEDURE — 99999 PR PBB SHADOW E&M-EST. PATIENT-LVL II: CPT | Mod: PBBFAC,,,

## 2019-07-17 PROCEDURE — 93306 TTE W/DOPPLER COMPLETE: CPT | Mod: S$GLB,,, | Performed by: INTERNAL MEDICINE

## 2019-07-17 PROCEDURE — 36415 COLL VENOUS BLD VENIPUNCTURE: CPT | Mod: PO

## 2019-07-17 PROCEDURE — 85025 COMPLETE CBC W/AUTO DIFF WBC: CPT

## 2019-07-17 PROCEDURE — 80061 LIPID PANEL: CPT

## 2019-07-17 PROCEDURE — 93306 TRANSTHORACIC ECHO (TTE) COMPLETE: ICD-10-PCS | Mod: S$GLB,,, | Performed by: INTERNAL MEDICINE

## 2019-07-17 PROCEDURE — 99999 PR PBB SHADOW E&M-EST. PATIENT-LVL II: ICD-10-PCS | Mod: PBBFAC,,,

## 2019-07-17 PROCEDURE — 80053 COMPREHEN METABOLIC PANEL: CPT

## 2019-07-19 LAB
ASCENDING AORTA: 2.65 CM
AV INDEX (PROSTH): 0.55
AV MEAN GRADIENT: 7 MMHG
AV PEAK GRADIENT: 12 MMHG
AV VALVE AREA: 1.62 CM2
AV VELOCITY RATIO: 0.51
BSA FOR ECHO PROCEDURE: 2.13 M2
CV ECHO LV RWT: 0.45 CM
DOP CALC AO PEAK VEL: 1.72 M/S
DOP CALC AO VTI: 45.86 CM
DOP CALC LVOT AREA: 3 CM2
DOP CALC LVOT DIAMETER: 1.94 CM
DOP CALC LVOT PEAK VEL: 0.88 M/S
DOP CALC LVOT STROKE VOLUME: 74.1 CM3
DOP CALCLVOT PEAK VEL VTI: 25.08 CM
E WAVE DECELERATION TIME: 211.23 MSEC
E/A RATIO: 1.25
E/E' RATIO: 25.33 M/S
ECHO LV POSTERIOR WALL: 1.21 CM (ref 0.6–1.1)
FRACTIONAL SHORTENING: 35 % (ref 28–44)
INTERVENTRICULAR SEPTUM: 1.2 CM (ref 0.6–1.1)
IVRT: 0.09 MSEC
LA MAJOR: 5.02 CM
LA MINOR: 4.58 CM
LA WIDTH: 3.88 CM
LEFT ATRIUM SIZE: 4.39 CM
LEFT ATRIUM VOLUME INDEX: 33.6 ML/M2
LEFT ATRIUM VOLUME: 69.35 CM3
LEFT INTERNAL DIMENSION IN SYSTOLE: 3.53 CM (ref 2.1–4)
LEFT VENTRICLE DIASTOLIC VOLUME INDEX: 68.59 ML/M2
LEFT VENTRICLE DIASTOLIC VOLUME: 141.48 ML
LEFT VENTRICLE MASS INDEX: 129 G/M2
LEFT VENTRICLE SYSTOLIC VOLUME INDEX: 25.2 ML/M2
LEFT VENTRICLE SYSTOLIC VOLUME: 51.96 ML
LEFT VENTRICULAR INTERNAL DIMENSION IN DIASTOLE: 5.4 CM (ref 3.5–6)
LEFT VENTRICULAR MASS: 265.94 G
LV LATERAL E/E' RATIO: 19 M/S
LV SEPTAL E/E' RATIO: 38 M/S
MV PEAK A VEL: 0.91 M/S
MV PEAK E VEL: 1.14 M/S
PISA TR MAX VEL: 2.56 M/S
PULM VEIN S/D RATIO: 0.79
PV PEAK D VEL: 0.48 M/S
PV PEAK S VEL: 0.38 M/S
RA MAJOR: 4.52 CM
RA WIDTH: 2.76 CM
RIGHT VENTRICULAR END-DIASTOLIC DIMENSION: 3.52 CM
RV TISSUE DOPPLER FREE WALL SYSTOLIC VELOCITY 1 (APICAL 4 CHAMBER VIEW): 7.27 CM/S
SINUS: 2.95 CM
STJ: 2.52 CM
TDI LATERAL: 0.06 M/S
TDI SEPTAL: 0.03 M/S
TDI: 0.05 M/S
TR MAX PG: 26 MMHG
TRICUSPID ANNULAR PLANE SYSTOLIC EXCURSION: 1.48 CM

## 2019-07-29 ENCOUNTER — PATIENT MESSAGE (OUTPATIENT)
Dept: CARDIOLOGY | Facility: CLINIC | Age: 74
End: 2019-07-29

## 2019-08-02 ENCOUNTER — OFFICE VISIT (OUTPATIENT)
Dept: CARDIOLOGY | Facility: CLINIC | Age: 74
End: 2019-08-02
Payer: MEDICARE

## 2019-08-02 VITALS
HEIGHT: 66 IN | WEIGHT: 217.38 LBS | SYSTOLIC BLOOD PRESSURE: 123 MMHG | DIASTOLIC BLOOD PRESSURE: 70 MMHG | HEART RATE: 77 BPM | BODY MASS INDEX: 34.93 KG/M2

## 2019-08-02 DIAGNOSIS — I10 ESSENTIAL HYPERTENSION: ICD-10-CM

## 2019-08-02 DIAGNOSIS — I25.10 CORONARY ARTERY DISEASE INVOLVING NATIVE CORONARY ARTERY OF NATIVE HEART WITHOUT ANGINA PECTORIS: Chronic | ICD-10-CM

## 2019-08-02 DIAGNOSIS — E11.9 TYPE 2 DIABETES MELLITUS WITHOUT COMPLICATION: ICD-10-CM

## 2019-08-02 DIAGNOSIS — Z95.1 S/P CABG (CORONARY ARTERY BYPASS GRAFT): ICD-10-CM

## 2019-08-02 DIAGNOSIS — Z95.5 S/P CORONARY ARTERY STENT PLACEMENT: Primary | Chronic | ICD-10-CM

## 2019-08-02 DIAGNOSIS — Z95.5 S/P CORONARY ARTERY STENT PLACEMENT: Chronic | ICD-10-CM

## 2019-08-02 DIAGNOSIS — Z78.9 STATIN INTOLERANCE: ICD-10-CM

## 2019-08-02 DIAGNOSIS — E78.5 DYSLIPIDEMIA: ICD-10-CM

## 2019-08-02 DIAGNOSIS — I47.10 SVT (SUPRAVENTRICULAR TACHYCARDIA): ICD-10-CM

## 2019-08-02 PROCEDURE — 99999 PR PBB SHADOW E&M-EST. PATIENT-LVL III: CPT | Mod: PBBFAC,,, | Performed by: INTERNAL MEDICINE

## 2019-08-02 PROCEDURE — 1101F PT FALLS ASSESS-DOCD LE1/YR: CPT | Mod: CPTII,S$GLB,, | Performed by: INTERNAL MEDICINE

## 2019-08-02 PROCEDURE — 3078F PR MOST RECENT DIASTOLIC BLOOD PRESSURE < 80 MM HG: ICD-10-PCS | Mod: CPTII,S$GLB,, | Performed by: INTERNAL MEDICINE

## 2019-08-02 PROCEDURE — 99214 PR OFFICE/OUTPT VISIT, EST, LEVL IV, 30-39 MIN: ICD-10-PCS | Mod: S$GLB,,, | Performed by: INTERNAL MEDICINE

## 2019-08-02 PROCEDURE — 99214 OFFICE O/P EST MOD 30 MIN: CPT | Mod: S$GLB,,, | Performed by: INTERNAL MEDICINE

## 2019-08-02 PROCEDURE — 3078F DIAST BP <80 MM HG: CPT | Mod: CPTII,S$GLB,, | Performed by: INTERNAL MEDICINE

## 2019-08-02 PROCEDURE — 3074F PR MOST RECENT SYSTOLIC BLOOD PRESSURE < 130 MM HG: ICD-10-PCS | Mod: CPTII,S$GLB,, | Performed by: INTERNAL MEDICINE

## 2019-08-02 PROCEDURE — 99999 PR PBB SHADOW E&M-EST. PATIENT-LVL III: ICD-10-PCS | Mod: PBBFAC,,, | Performed by: INTERNAL MEDICINE

## 2019-08-02 PROCEDURE — 99499 UNLISTED E&M SERVICE: CPT | Mod: S$GLB,,, | Performed by: INTERNAL MEDICINE

## 2019-08-02 PROCEDURE — 1101F PR PT FALLS ASSESS DOC 0-1 FALLS W/OUT INJ PAST YR: ICD-10-PCS | Mod: CPTII,S$GLB,, | Performed by: INTERNAL MEDICINE

## 2019-08-02 PROCEDURE — 99499 RISK ADDL DX/OHS AUDIT: ICD-10-PCS | Mod: S$GLB,,, | Performed by: INTERNAL MEDICINE

## 2019-08-02 PROCEDURE — 3074F SYST BP LT 130 MM HG: CPT | Mod: CPTII,S$GLB,, | Performed by: INTERNAL MEDICINE

## 2019-08-02 RX ORDER — NITROGLYCERIN 0.4 MG/1
0.4 TABLET SUBLINGUAL EVERY 5 MIN PRN
Qty: 30 TABLET | Refills: 12 | Status: SHIPPED | OUTPATIENT
Start: 2019-08-02 | End: 2020-08-01

## 2019-08-02 NOTE — PROGRESS NOTES
Subjective:    Patient ID:  Mayra Pinedo is a 74 y.o. female who presents for follow-up of No chief complaint on file.      HPI  Here for follow up of CABG- PCI (NICOLE 7/16)/atrial tach s/p RFA (6/17)/AS. Patients states is doing well no chest pain, SOB or change in exertional tolerence.    Review of Systems   Constitution: Negative for malaise/fatigue.   Eyes: Negative for blurred vision.   Cardiovascular: Negative for chest pain, claudication, cyanosis, dyspnea on exertion, irregular heartbeat, leg swelling, near-syncope, orthopnea, palpitations, paroxysmal nocturnal dyspnea and syncope.   Respiratory: Negative for cough and shortness of breath.    Hematologic/Lymphatic: Does not bruise/bleed easily.   Musculoskeletal: Negative for back pain, falls, joint pain, muscle cramps, muscle weakness and myalgias.   Gastrointestinal: Negative for abdominal pain, change in bowel habit, nausea and vomiting.   Genitourinary: Negative for urgency.   Neurological: Negative for dizziness, focal weakness and light-headedness.       Past Medical History:   Diagnosis Date    Anticoagulant long-term use     Plavix and ASA therapy    Arthritis     Asthma     seasonal, allergies    Blood in stool     Blood transfusion     Coronary artery disease     stent X1    Coronary artery disease involving native coronary artery of native heart without angina pectoris 6/1/2016    Diabetes mellitus     Dyslipidemia 6/1/2016    GERD (gastroesophageal reflux disease)     Hypertension     S/P CABG (coronary artery bypass graft) x 3 1991 6/1/2016    S/P CABG  x 3 1991 LIMA-LAD, VG-D (occluded), VG-RCA    S/P coronary artery stent placement 6/1/2016 7/2016 PDA- 2.25 x 24, synergy  8/15 lad/diag promus 2.5x16  8/2009 vg-rca- promus 3.5x8    Statin intolerance 11/30/2017    Failed prava/lipitor/crestor. Tolerates livalo    SVT (supraventricular tachycardia) 4/24/2017 6/2017 EPS 1.  Normal baseline intervals. 2.  No evidence of an  accessory pathway. 3.  Atrial tachycardia mapped to the posterior aspect near the IVC, status post radiofrequency ablation.          Objective:     There were no vitals filed for this visit.     Physical Exam   Constitutional: She is oriented to person, place, and time. She appears well-developed and well-nourished.   HENT:   Head: Normocephalic.   Eyes: Conjunctivae are normal.   Neck: Normal range of motion. Neck supple. No JVD present.   Cardiovascular: Normal rate, regular rhythm, normal heart sounds and intact distal pulses.   Pulses:       Carotid pulses are 2+ on the right side, and 2+ on the left side.       Radial pulses are 2+ on the right side, and 2+ on the left side.        Dorsalis pedis pulses are 2+ on the right side, and 2+ on the left side.        Posterior tibial pulses are 2+ on the right side, and 2+ on the left side.   Well healed midline sternal incision.     Pulmonary/Chest: Effort normal and breath sounds normal.   Abdominal: Soft. Bowel sounds are normal.   Musculoskeletal: She exhibits no edema or tenderness.   Neurological: She is alert and oriented to person, place, and time. Gait normal.   Skin: Skin is warm, dry and intact. No cyanosis. Nails show no clubbing.   Psychiatric: She has a normal mood and affect. Her speech is normal and behavior is normal. Thought content normal.             ..    Chemistry        Component Value Date/Time     07/17/2019 0846    K 4.1 07/17/2019 0846     07/17/2019 0846    CO2 25 07/17/2019 0846    BUN 10 07/17/2019 0846    CREATININE 1.0 07/17/2019 0846     (H) 07/17/2019 0846        Component Value Date/Time    CALCIUM 9.8 07/17/2019 0846    ALKPHOS 41 (L) 07/17/2019 0846    AST 21 07/17/2019 0846    ALT 24 07/17/2019 0846    BILITOT 0.6 07/17/2019 0846    ESTGFRAFRICA >60.0 07/17/2019 0846    EGFRNONAA 56.4 (A) 07/17/2019 0846            ..  Lab Results   Component Value Date    CHOL 129 07/17/2019    CHOL 120 10/03/2018    CHOL 126  11/15/2017     Lab Results   Component Value Date    HDL 28 (L) 07/17/2019    HDL 38 (L) 10/03/2018    HDL 33 (L) 11/15/2017     Lab Results   Component Value Date    LDLCALC 46.0 (L) 07/17/2019    LDLCALC 51.2 (L) 10/03/2018    LDLCALC 37.8 (L) 11/15/2017     Lab Results   Component Value Date    TRIG 275 (H) 07/17/2019    TRIG 154 (H) 10/03/2018    TRIG 276 (H) 11/15/2017     Lab Results   Component Value Date    CHOLHDL 21.7 07/17/2019    CHOLHDL 31.7 10/03/2018    CHOLHDL 26.2 11/15/2017     ..  Lab Results   Component Value Date    WBC 4.66 07/17/2019    HGB 12.7 07/17/2019    HCT 40.2 07/17/2019     (H) 07/17/2019     07/17/2019       Test(s) Reviewed  I have reviewed the following in detail:  [] Stress test   [] Angiography   [x] Echocardiogram   [x] Labs   [x] Other:       Assessment:         ICD-10-CM ICD-9-CM   1. S/P coronary artery stent placement Z95.5 V45.82   2. S/P CABG (coronary artery bypass graft) x 3 1991 Z95.1 V45.81   3. Statin intolerance Z78.9 995.27   4. Dyslipidemia E78.5 272.4   5. SVT (supraventricular tachycardia) I47.1 427.89   6. Coronary artery disease involving native coronary artery of native heart without angina pectoris I25.10 414.01   7. Essential hypertension I10 401.9     Problem List Items Addressed This Visit     SVT (supraventricular tachycardia)    Overview     6/2017 EPS  1.  Normal baseline intervals.  2.  No evidence of an accessory pathway.  3.  Atrial tachycardia mapped to the posterior aspect near the IVC, status post radiofrequency ablation.         Statin intolerance    Overview     Failed prava/lipitor/crestor. Tolerates livalo         S/P coronary artery stent placement - Primary (Chronic)    Overview     7/2016 PDA- 2.25 x 24, synergy    8/15 lad/diag promus 2.5x16    8/2009 vg-rca- promus 3.5x8         S/P CABG (coronary artery bypass graft) x 3 1991    Overview     S/P CABG  x 3 1991 LIMA-LAD, VG-D (occluded), VG-RCA         Essential hypertension     Dyslipidemia    Coronary artery disease involving native coronary artery of native heart without angina pectoris (Chronic)           Plan:           Return to clinic 12 months   Low level/low impact aerobic exercise 5x's/wk. Heart healthy diet and risk factor modification.    See labs and med orders.  .HELEN mildred results due to calf cramps.  CFD/labs 1 year      Portions of this note may have been created with voice recognition software.  Grammatical, syntax and spelling errors may be inevitable.

## 2019-08-02 NOTE — PROGRESS NOTES
Patient, Mayra Pinedo (MRN #[id number]), presented with a recorded BMI of 35.09 kg/m^2 and a documented comorbidity(s):  - Diabetes Mellitus Type 2  - Hypertension  - Hyperlipidemia  to which the severe obesity is a contributing factor. This is consistent with the definition of severe obesity (BMI 35.0-39.9) with comorbidity (ICD-10 E66.01, Z68.35). The patient's severe obesity was monitored, evaluated, addressed and/or treated. This addendum to the medical record is made on 08/02/2019.

## 2019-08-04 DIAGNOSIS — I25.10 CORONARY ARTERY DISEASE INVOLVING NATIVE CORONARY ARTERY OF NATIVE HEART WITHOUT ANGINA PECTORIS: Chronic | ICD-10-CM

## 2019-08-04 DIAGNOSIS — E11.9 TYPE 2 DIABETES MELLITUS WITHOUT COMPLICATION: ICD-10-CM

## 2019-08-04 DIAGNOSIS — Z95.5 S/P CORONARY ARTERY STENT PLACEMENT: Chronic | ICD-10-CM

## 2019-08-04 DIAGNOSIS — I10 ESSENTIAL HYPERTENSION: ICD-10-CM

## 2019-08-04 DIAGNOSIS — E78.5 DYSLIPIDEMIA: ICD-10-CM

## 2019-08-05 RX ORDER — ISOSORBIDE MONONITRATE 60 MG/1
TABLET, EXTENDED RELEASE ORAL
Qty: 90 TABLET | Refills: 4 | Status: SHIPPED | OUTPATIENT
Start: 2019-08-05 | End: 2020-08-10

## 2019-08-15 ENCOUNTER — HOSPITAL ENCOUNTER (OUTPATIENT)
Dept: RADIOLOGY | Facility: HOSPITAL | Age: 74
Discharge: HOME | End: 2019-08-15
Attending: OBSTETRICS & GYNECOLOGY
Payer: MEDICARE

## 2019-08-15 ENCOUNTER — CLINICAL SUPPORT (OUTPATIENT)
Dept: CARDIOLOGY | Facility: CLINIC | Age: 74
End: 2019-08-15
Attending: INTERNAL MEDICINE
Payer: MEDICARE

## 2019-08-15 VITALS — WEIGHT: 217 LBS | HEIGHT: 66 IN | BODY MASS INDEX: 34.87 KG/M2

## 2019-08-15 DIAGNOSIS — E78.5 DYSLIPIDEMIA: ICD-10-CM

## 2019-08-15 DIAGNOSIS — Z95.5 S/P CORONARY ARTERY STENT PLACEMENT: ICD-10-CM

## 2019-08-15 DIAGNOSIS — Z78.9 STATIN INTOLERANCE: ICD-10-CM

## 2019-08-15 DIAGNOSIS — Z12.39 ENCOUNTER FOR SPECIAL SCREENING EXAMINATION FOR NEOPLASM OF BREAST: ICD-10-CM

## 2019-08-15 DIAGNOSIS — I25.10 CORONARY ARTERY DISEASE INVOLVING NATIVE CORONARY ARTERY OF NATIVE HEART WITHOUT ANGINA PECTORIS: ICD-10-CM

## 2019-08-15 PROCEDURE — 77063 MAMMO DIGITAL SCREENING BILAT WITH TOMOSYNTHESIS_CAD: ICD-10-PCS | Mod: 26,,, | Performed by: RADIOLOGY

## 2019-08-15 PROCEDURE — 93925 LOWER EXTREMITY STUDY: CPT | Mod: S$GLB,,, | Performed by: INTERNAL MEDICINE

## 2019-08-15 PROCEDURE — 99999 PR PBB SHADOW E&M-EST. PATIENT-LVL I: ICD-10-PCS | Mod: PBBFAC,,,

## 2019-08-15 PROCEDURE — 77067 MAMMO DIGITAL SCREENING BILAT WITH TOMOSYNTHESIS_CAD: ICD-10-PCS | Mod: 26,,, | Performed by: RADIOLOGY

## 2019-08-15 PROCEDURE — 77067 SCR MAMMO BI INCL CAD: CPT | Mod: 26,,, | Performed by: RADIOLOGY

## 2019-08-15 PROCEDURE — 93925 CV US DOPPLER ARTERIAL LEGS BILATERAL (CUPID ONLY): ICD-10-PCS | Mod: S$GLB,,, | Performed by: INTERNAL MEDICINE

## 2019-08-15 PROCEDURE — 77067 SCR MAMMO BI INCL CAD: CPT | Mod: TC,PN

## 2019-08-15 PROCEDURE — 77063 BREAST TOMOSYNTHESIS BI: CPT | Mod: 26,,, | Performed by: RADIOLOGY

## 2019-08-15 PROCEDURE — 99999 PR PBB SHADOW E&M-EST. PATIENT-LVL I: CPT | Mod: PBBFAC,,,

## 2019-08-16 LAB
LEFT ANT TIBIAL SYS PSV: 38 CM/S
LEFT CFA PSV: 107 CM/S
LEFT PERONEAL SYS PSV: 80 CM/S
LEFT POPLITEAL PSV: 86 CM/S
LEFT POST TIBIAL SYS PSV: 82 CM/S
LEFT PROFUNDA SYS PSV: 51 CM/S
LEFT SUPER FEMORAL DIST SYS PSV: 100 CM/S
LEFT SUPER FEMORAL MID SYS PSV: 108 CM/S
LEFT SUPER FEMORAL OSTIAL SYS PSV: 140 CM/S
LEFT SUPER FEMORAL PROX SYS PSV: 114 CM/S
LEFT TIB/PER TRUNK SYS PSV: 69 CM/S
OHS CV LEFT LOWER EXTREMITY ABI (NO CALC): 1
OHS CV RIGHT ABI LOWER EXTREMITY (NO CALC): 1
RIGHT ANT TIBIAL SYS PSV: 118 CM/S
RIGHT CFA PSV: 118 CM/S
RIGHT PERONEAL SYS PSV: 96 CM/S
RIGHT POPLITEAL PSV: 81 CM/S
RIGHT POST TIBIAL SYS PSV: 118 CM/S
RIGHT PROFUNDA SYS PSV: 118 CM/S
RIGHT SUPER FEMORAL DIST SYS PSV: 143 CM/S
RIGHT SUPER FEMORAL MID SYS PSV: 152 CM/S
RIGHT SUPER FEMORAL OSTIAL SYS PSV: 131 CM/S
RIGHT SUPER FEMORAL PROX SYS PSV: 131 CM/S
RIGHT TIB/PER TRUNK SYS PSV: 74 CM/S

## 2019-09-11 DIAGNOSIS — I25.10 CORONARY ARTERY DISEASE INVOLVING NATIVE CORONARY ARTERY OF NATIVE HEART WITHOUT ANGINA PECTORIS: Chronic | ICD-10-CM

## 2019-09-11 DIAGNOSIS — E78.5 DYSLIPIDEMIA: ICD-10-CM

## 2019-09-11 DIAGNOSIS — Z95.5 S/P CORONARY ARTERY STENT PLACEMENT: Chronic | ICD-10-CM

## 2019-09-11 DIAGNOSIS — I10 ESSENTIAL HYPERTENSION: ICD-10-CM

## 2019-09-12 ENCOUNTER — PATIENT MESSAGE (OUTPATIENT)
Dept: CARDIOLOGY | Facility: CLINIC | Age: 74
End: 2019-09-12

## 2019-09-12 RX ORDER — LOSARTAN POTASSIUM 25 MG/1
25 TABLET ORAL EVERY MORNING
Qty: 90 TABLET | Refills: 6 | Status: SHIPPED | OUTPATIENT
Start: 2019-09-12 | End: 2020-12-11

## 2019-10-22 ENCOUNTER — PATIENT MESSAGE (OUTPATIENT)
Dept: CARDIOLOGY | Facility: CLINIC | Age: 74
End: 2019-10-22

## 2019-10-23 ENCOUNTER — PATIENT MESSAGE (OUTPATIENT)
Dept: CARDIOLOGY | Facility: CLINIC | Age: 74
End: 2019-10-23

## 2019-11-04 ENCOUNTER — PATIENT MESSAGE (OUTPATIENT)
Dept: DERMATOLOGY | Facility: CLINIC | Age: 74
End: 2019-11-04

## 2019-11-04 ENCOUNTER — OFFICE VISIT (OUTPATIENT)
Dept: DERMATOLOGY | Facility: CLINIC | Age: 74
End: 2019-11-04
Payer: MEDICARE

## 2019-11-04 VITALS — HEIGHT: 66 IN | WEIGHT: 216.94 LBS | BODY MASS INDEX: 34.86 KG/M2 | RESPIRATION RATE: 18 BRPM

## 2019-11-04 DIAGNOSIS — L57.0 AK (ACTINIC KERATOSIS): ICD-10-CM

## 2019-11-04 DIAGNOSIS — L70.0 OPEN COMEDONE: ICD-10-CM

## 2019-11-04 DIAGNOSIS — L81.4 LENTIGINES: ICD-10-CM

## 2019-11-04 DIAGNOSIS — D48.9 NEOPLASM OF UNCERTAIN BEHAVIOR: Primary | ICD-10-CM

## 2019-11-04 DIAGNOSIS — L82.1 SEBORRHEIC KERATOSES: ICD-10-CM

## 2019-11-04 PROCEDURE — 11102 TANGNTL BX SKIN SINGLE LES: CPT | Mod: S$GLB,,, | Performed by: DERMATOLOGY

## 2019-11-04 PROCEDURE — 10040 EXTRACTION: CPT | Mod: S$GLB,,, | Performed by: DERMATOLOGY

## 2019-11-04 PROCEDURE — 99213 OFFICE O/P EST LOW 20 MIN: CPT | Mod: 25,S$GLB,, | Performed by: DERMATOLOGY

## 2019-11-04 PROCEDURE — 17000 DESTRUCT PREMALG LESION: CPT | Mod: 59,S$GLB,, | Performed by: DERMATOLOGY

## 2019-11-04 PROCEDURE — 17003 DESTRUCTION, PREMALIGNANT LESIONS; SECOND THROUGH 14 LESIONS: ICD-10-PCS | Mod: S$GLB,,, | Performed by: DERMATOLOGY

## 2019-11-04 PROCEDURE — 88305 TISSUE SPECIMEN TO PATHOLOGY, DERMATOLOGY: ICD-10-PCS | Mod: 26,,, | Performed by: PATHOLOGY

## 2019-11-04 PROCEDURE — 88342 TISSUE SPECIMEN TO PATHOLOGY, DERMATOLOGY: ICD-10-PCS | Mod: 26,,, | Performed by: PATHOLOGY

## 2019-11-04 PROCEDURE — 99999 PR PBB SHADOW E&M-EST. PATIENT-LVL III: ICD-10-PCS | Mod: PBBFAC,,, | Performed by: DERMATOLOGY

## 2019-11-04 PROCEDURE — 1101F PT FALLS ASSESS-DOCD LE1/YR: CPT | Mod: CPTII,S$GLB,, | Performed by: DERMATOLOGY

## 2019-11-04 PROCEDURE — 1101F PR PT FALLS ASSESS DOC 0-1 FALLS W/OUT INJ PAST YR: ICD-10-PCS | Mod: CPTII,S$GLB,, | Performed by: DERMATOLOGY

## 2019-11-04 PROCEDURE — 88305 TISSUE EXAM BY PATHOLOGIST: CPT | Performed by: PATHOLOGY

## 2019-11-04 PROCEDURE — 17000 PR DESTRUCTION(LASER SURGERY,CRYOSURGERY,CHEMOSURGERY),PREMALIGNANT LESIONS,FIRST LESION: ICD-10-PCS | Mod: 59,S$GLB,, | Performed by: DERMATOLOGY

## 2019-11-04 PROCEDURE — 10040 PR ACNE SURGERY OF SKIN ABSCESS: ICD-10-PCS | Mod: S$GLB,,, | Performed by: DERMATOLOGY

## 2019-11-04 PROCEDURE — 17003 DESTRUCT PREMALG LES 2-14: CPT | Mod: S$GLB,,, | Performed by: DERMATOLOGY

## 2019-11-04 PROCEDURE — 11102 PR TANGENTIAL BIOPSY, SKIN, SINGLE LESION: ICD-10-PCS | Mod: S$GLB,,, | Performed by: DERMATOLOGY

## 2019-11-04 PROCEDURE — 99999 PR PBB SHADOW E&M-EST. PATIENT-LVL III: CPT | Mod: PBBFAC,,, | Performed by: DERMATOLOGY

## 2019-11-04 PROCEDURE — 99213 PR OFFICE/OUTPT VISIT, EST, LEVL III, 20-29 MIN: ICD-10-PCS | Mod: 25,S$GLB,, | Performed by: DERMATOLOGY

## 2019-11-04 PROCEDURE — 88342 IMHCHEM/IMCYTCHM 1ST ANTB: CPT | Mod: 26,,, | Performed by: PATHOLOGY

## 2019-11-04 NOTE — PROGRESS NOTES
Subjective:       Patient ID:  Mayra Pinedo is a 74 y.o. female who presents for   Chief Complaint   Patient presents with    Lesion     LOV 5/16/19  Present with c/o recurring lesion to left temple x weeks. Cryotherapy performed to area at last visit    Also has scaly lesions to scalp and L wrist she would like examined.    Phx of AKs s/p cryotherapy  No phx of NMSC  No fhx of melanoma    Past Medical History:  No date: Anticoagulant long-term use      Comment:  Plavix and ASA therapy  No date: Arthritis  No date: Asthma      Comment:  seasonal, allergies  No date: Blood in stool  No date: Blood transfusion  No date: Coronary artery disease      Comment:  stent X1  6/1/2016: Coronary artery disease involving native coronary artery of   native heart without angina pectoris  No date: Diabetes mellitus  6/1/2016: Dyslipidemia  No date: GERD (gastroesophageal reflux disease)  No date: Hypertension  6/1/2016: S/P CABG (coronary artery bypass graft) x 3 1991      Comment:  S/P CABG  x 3 1991 LIMA-LAD, VG-D (occluded), VG-RCA  6/1/2016: S/P coronary artery stent placement      Comment:  7/2016 PDA- 2.25 x 24, synergy  8/15 lad/diag promus                2.5x16  8/2009 vg-rca- promus 3.5x8  11/30/2017: Statin intolerance      Comment:  Failed prava/lipitor/crestor. Tolerates livalo  4/24/2017: SVT (supraventricular tachycardia)      Comment:  6/2017 EPS 1.  Normal baseline intervals. 2.  No                evidence of an accessory pathway. 3.  Atrial tachycardia                mapped to the posterior aspect near the IVC, status post                radiofrequency ablation.        Review of Systems   Constitutional: Negative for fever and chills.   HENT: Negative for sore throat.    Respiratory: Negative for cough.    Gastrointestinal: Negative for nausea and vomiting.   Skin: Positive for dry skin and activity-related sunscreen use. Negative for itching, rash, daily sunscreen use and recent sunburn.    Hematologic/Lymphatic: Bruises/bleeds easily.        Objective:    Physical Exam   Constitutional: She appears well-developed and well-nourished. No distress.   Neurological: She is alert and oriented to person, place, and time. She is not disoriented.   Psychiatric: She has a normal mood and affect.   Skin:   Areas Examined (abnormalities noted in diagram):   Head / Face Inspection Performed  Neck Inspection Performed  Chest / Axilla Inspection Performed  Back Inspection Performed  RUE Inspected  LUE Inspection Performed                   Diagram Legend     Erythematous scaling macule/papule c/w actinic keratosis       Vascular papule c/w angioma      Pigmented verrucoid papule/plaque c/w seborrheic keratosis      Yellow umbilicated papule c/w sebaceous hyperplasia      Irregularly shaped tan macule c/w lentigo     1-2 mm smooth white papules consistent with Milia      Movable subcutaneous cyst with punctum c/w epidermal inclusion cyst      Subcutaneous movable cyst c/w pilar cyst      Firm pink to brown papule c/w dermatofibroma      Pedunculated fleshy papule(s) c/w skin tag(s)      Evenly pigmented macule c/w junctional nevus     Mildly variegated pigmented, slightly irregular-bordered macule c/w mildly atypical nevus      Flesh colored to evenly pigmented papule c/w intradermal nevus       Pink pearly papule/plaque c/w basal cell carcinoma      Erythematous hyperkeratotic cursted plaque c/w SCC      Surgical scar with no sign of skin cancer recurrence      Open and closed comedones      Inflammatory papules and pustules      Verrucoid papule consistent consistent with wart     Erythematous eczematous patches and plaques     Dystrophic onycholytic nail with subungual debris c/w onychomycosis     Umbilicated papule    Erythematous-base heme-crusted tan verrucoid plaque consistent with inflamed seborrheic keratosis     Erythematous Silvery Scaling Plaque c/w Psoriasis     See annotation          Assessment /  Plan:      Pathology Orders:     Normal Orders This Visit    Tissue Specimen To Pathology, Dermatology     Questions:    Directional Terms:  Other(comment)    Clinical Information:  AK vs SCC    Specific Site:  Left temple        Neoplasm of uncertain behavior  -     Tissue Specimen To Pathology, Dermatology    Shave biopsy procedure note:    Shave biopsy performed after verbal consent including risk of infection, scar, recurrence, need for additional treatment of site. Area prepped with alcohol, anesthetized with approximately 1.0cc of 1% lidocaine with epinephrine. Lesional tissue shaved with razor blade. Hemostasis achieved with application of aluminum chloride followed by hyfrecation. No complications. Dressing applied. Wound care explained.        AK (actinic keratosis)  Premalignant nature discussed     Cryosurgery Procedure Note    Verbal consent from the patient is obtained including, but not limited to, risk of hypopigmentation/hyperpigmentation, scar, recurrence of lesion. The patient is aware of the precancerous quality and need for treatment of these lesions. Liquid nitrogen cryosurgery is applied to the 2 actinic keratoses, as detailed in the physical exam, to produce a freeze injury. The patient is aware that blisters may form and is instructed on wound care with gentle cleansing and use of vaseline ointment to keep moist until healed. The patient is supplied a handout on cryosurgery and is instructed to call if lesions do not completely resolve.      Seborrheic keratoses  These are benign inherited growths without a malignant potential. Reassurance given to patient. No treatment is necessary.       Lentigines  This is a benign hyperpigmented sun induced lesion. Daily sun protection will reduce the number of new lesions. Treatment of these benign lesions are considered cosmetic.      Open comedone  Benign nature discussed. Lesion is irritating at times.   - After verbal consent was obtained, site(s)  were cleansed with alcohol. Anesthetized with approximately 1.0cc of 1% lidocaine with epinephrine. A sterile injection needle was used to puncture the surface of the cyst(s). Comedo extractor was used to expel contents. Patient tolerated procedure without complications. Site(s) were dressed with vaseline and bandage(s) applied as necessary. Wound care instructions were provided.             Follow up in about 1 year (around 11/4/2020).       ADDENDUM:  1. Skin, left temple, shave biopsy:  - ACTINIC KERATOSIS WITH FOLLICULAR INVOLVEMENT.  MICROSCOPIC DESCRIPTION: Sections show atypia within the lowermost epidermal layers associated with  hyper- and parakeratosis and solar elastosis. The changes extend into the hair follicles. Ki 67  immunohistochemical stain shows an elevated proliferative index that fails to span the full-thickness of the involved  epidermis. Appropriately reactive controls were reviewed.  Diagnosed by: Hong Monge M.D.    Recommend cryotherapy given follicular involvement

## 2019-11-13 ENCOUNTER — TELEPHONE (OUTPATIENT)
Dept: DERMATOLOGY | Facility: CLINIC | Age: 74
End: 2019-11-13

## 2019-11-13 NOTE — TELEPHONE ENCOUNTER
----- Message from Patricia Juarez MD sent at 11/13/2019  7:54 AM CST -----  Please inform pt of precancer diagnosis. Given follicular involvement, recommend RTC for cryotherapy in 4-6 wks. Please schedule.

## 2019-11-13 NOTE — TELEPHONE ENCOUNTER
Spoke w/ pt. Informed pt about results and recommendations per provider. pt verbalized understanding.    appt on 12/11/2019.

## 2019-11-14 ENCOUNTER — TELEPHONE (OUTPATIENT)
Dept: RHEUMATOLOGY | Facility: CLINIC | Age: 74
End: 2019-11-14

## 2019-11-14 NOTE — TELEPHONE ENCOUNTER
----- Message from Sylvie Cruz sent at 11/14/2019  2:47 PM CST -----  Type: Needs Medical Advice    Who Called:  Patient - Mayra Funes Call Back Number:  [phone]   Additional Information: pt wants to come back to Dr Wharton she loved her and isnt happy with her current dr at [facility] but understands that she isnt taking any NP right now would like to be kept in mind at least for NP to come back

## 2019-12-11 ENCOUNTER — OFFICE VISIT (OUTPATIENT)
Dept: DERMATOLOGY | Facility: CLINIC | Age: 74
End: 2019-12-11
Payer: MEDICARE

## 2019-12-11 VITALS — WEIGHT: 216.94 LBS | BODY MASS INDEX: 34.86 KG/M2 | RESPIRATION RATE: 18 BRPM | HEIGHT: 66 IN

## 2019-12-11 DIAGNOSIS — L72.0 EIC (EPIDERMAL INCLUSION CYST): ICD-10-CM

## 2019-12-11 DIAGNOSIS — L82.1 SEBORRHEIC KERATOSES: ICD-10-CM

## 2019-12-11 DIAGNOSIS — L57.0 AK (ACTINIC KERATOSIS): Primary | ICD-10-CM

## 2019-12-11 PROCEDURE — 99999 PR PBB SHADOW E&M-EST. PATIENT-LVL III: ICD-10-PCS | Mod: PBBFAC,,, | Performed by: DERMATOLOGY

## 2019-12-11 PROCEDURE — 17000 PR DESTRUCTION(LASER SURGERY,CRYOSURGERY,CHEMOSURGERY),PREMALIGNANT LESIONS,FIRST LESION: ICD-10-PCS | Mod: S$GLB,,, | Performed by: DERMATOLOGY

## 2019-12-11 PROCEDURE — 17000 DESTRUCT PREMALG LESION: CPT | Mod: S$GLB,,, | Performed by: DERMATOLOGY

## 2019-12-11 PROCEDURE — 99499 NO LOS: ICD-10-PCS | Mod: S$GLB,,, | Performed by: DERMATOLOGY

## 2019-12-11 PROCEDURE — 99999 PR PBB SHADOW E&M-EST. PATIENT-LVL III: CPT | Mod: PBBFAC,,, | Performed by: DERMATOLOGY

## 2019-12-11 PROCEDURE — 99499 UNLISTED E&M SERVICE: CPT | Mod: S$GLB,,, | Performed by: DERMATOLOGY

## 2019-12-11 RX ORDER — MELOXICAM 15 MG/1
15 TABLET ORAL
COMMUNITY
Start: 2019-11-18 | End: 2020-06-24

## 2019-12-11 RX ORDER — CEFDINIR 300 MG/1
CAPSULE ORAL
COMMUNITY
Start: 2019-12-03 | End: 2019-12-12

## 2019-12-11 NOTE — PROGRESS NOTES
Subjective:       Patient ID:  Mayra Pinedo is a 74 y.o. female who presents for   Chief Complaint   Patient presents with    Follow-up     Patient present for follow up and cryotherapy of AK on Left temple, see biopsy results below    11/4/19:  1. Skin, left temple, shave biopsy:  - ACTINIC KERATOSIS WITH FOLLICULAR INVOLVEMENT.  MICROSCOPIC DESCRIPTION: Sections show atypia within the lowermost epidermal layers associated with  hyper- and parakeratosis and solar elastosis. The changes extend into the hair follicles. Ki 67  immunohistochemical stain shows an elevated proliferative index that fails to span the full-thickness of the involved  epidermis. Appropriately reactive controls were reviewed.  Diagnosed by: Hong Monge M.D.      She also c/o a lesion to L temple x days (new lesion). The patient denies any change, including change in color, increase in size, or spontaneous bleeding, associated with this lesion. Lesion has improved. Not treating.    Hx of AK  no Phx of NMSC.  no Fhx of melanoma.    Past Medical History:  No date: Anticoagulant long-term use      Comment:  Plavix and ASA therapy  No date: Arthritis  No date: Asthma      Comment:  seasonal, allergies  No date: Blood in stool  No date: Blood transfusion  No date: Coronary artery disease      Comment:  stent X1  6/1/2016: Coronary artery disease involving native coronary artery of   native heart without angina pectoris  No date: Diabetes mellitus  6/1/2016: Dyslipidemia  No date: GERD (gastroesophageal reflux disease)  No date: Hypertension  6/1/2016: S/P CABG (coronary artery bypass graft) x 3 1991      Comment:  S/P CABG  x 3 1991 LIMA-LAD, VG-D (occluded), VG-RCA  6/1/2016: S/P coronary artery stent placement      Comment:  7/2016 PDA- 2.25 x 24, synergy  8/15 lad/diag promus                2.5x16  8/2009 vg-rca- promus 3.5x8  11/30/2017: Statin intolerance      Comment:  Failed prava/lipitor/crestor. Tolerates livalo  4/24/2017: SVT  (supraventricular tachycardia)      Comment:  6/2017 EPS 1.  Normal baseline intervals. 2.  No                evidence of an accessory pathway. 3.  Atrial tachycardia                mapped to the posterior aspect near the IVC, status post                radiofrequency ablation.      Review of Systems   Constitutional: Negative for fever and chills.   HENT: Negative for sore throat.    Respiratory: Negative for cough.    Gastrointestinal: Negative for nausea and vomiting.   Skin: Positive for dry skin and activity-related sunscreen use. Negative for itching, rash, daily sunscreen use and recent sunburn.   Hematologic/Lymphatic: Bruises/bleeds easily.        Objective:    Physical Exam   Constitutional: She appears well-developed and well-nourished. No distress.   Neurological: She is alert and oriented to person, place, and time. She is not disoriented.   Psychiatric: She has a normal mood and affect.   Skin:   Areas Examined (abnormalities noted in diagram):   Head / Face Inspection Performed  Neck Inspection Performed  Back Inspection Performed                   Diagram Legend     Erythematous scaling macule/papule c/w actinic keratosis       Vascular papule c/w angioma      Pigmented verrucoid papule/plaque c/w seborrheic keratosis      Yellow umbilicated papule c/w sebaceous hyperplasia      Irregularly shaped tan macule c/w lentigo     1-2 mm smooth white papules consistent with Milia      Movable subcutaneous cyst with punctum c/w epidermal inclusion cyst      Subcutaneous movable cyst c/w pilar cyst      Firm pink to brown papule c/w dermatofibroma      Pedunculated fleshy papule(s) c/w skin tag(s)      Evenly pigmented macule c/w junctional nevus     Mildly variegated pigmented, slightly irregular-bordered macule c/w mildly atypical nevus      Flesh colored to evenly pigmented papule c/w intradermal nevus       Pink pearly papule/plaque c/w basal cell carcinoma      Erythematous hyperkeratotic cursted plaque  c/w SCC      Surgical scar with no sign of skin cancer recurrence      Open and closed comedones      Inflammatory papules and pustules      Verrucoid papule consistent consistent with wart     Erythematous eczematous patches and plaques     Dystrophic onycholytic nail with subungual debris c/w onychomycosis     Umbilicated papule    Erythematous-base heme-crusted tan verrucoid plaque consistent with inflamed seborrheic keratosis     Erythematous Silvery Scaling Plaque c/w Psoriasis     See annotation      Assessment / Plan:        AK (actinic keratosis), biopsy proven with follicular involvement  Premalignant nature discussed     Cryosurgery Procedure Note    Verbal consent from the patient is obtained including, but not limited to, risk of hypopigmentation/hyperpigmentation, scar, recurrence of lesion. The patient is aware of the precancerous quality and need for treatment of these lesions. Liquid nitrogen cryosurgery is applied to the 1 actinic keratoses, as detailed in the physical exam, to produce a freeze injury. The patient is aware that blisters may form and is instructed on wound care with gentle cleansing and use of vaseline ointment to keep moist until healed. The patient is supplied a handout on cryosurgery and is instructed to call if lesions do not completely resolve.      Seborrheic keratoses  These are benign inherited growths without a malignant potential. Reassurance given to patient. No treatment is necessary.       EIC (epidermal inclusion cyst)  - The benign nature of these lesions was discussed with the patient and that no treatment is indicated today.   - Discussed that when not inflamed, definite treatment includes surgical excision.             Follow up for at pts convenience for cyst excision.

## 2020-01-16 ENCOUNTER — TELEPHONE (OUTPATIENT)
Dept: RHEUMATOLOGY | Facility: CLINIC | Age: 75
End: 2020-01-16

## 2020-01-16 NOTE — TELEPHONE ENCOUNTER
----- Message from Cody Nino sent at 1/16/2020  1:53 PM CST -----  Contact: same  Patient called in and stated she never heard back from office and has been trying to scheduled a new patient appt.  Patient has RA.  Patient stated she used to see Dr. Wharton when she was in [place].    Patient call back number is [phone] cell

## 2020-01-16 NOTE — TELEPHONE ENCOUNTER
----- Message from Rose Aldana sent at 1/16/2020  3:58 PM CST -----  Contact: patient  Type:  Patient Returning Call  Who Called:  patient  Who Left Message for Patient:  drew  Does the patient know what this is regarding?:  yes  Best Call Back Number:  [phone]  Additional Information:  Sent message to the pod. Thanks!

## 2020-02-03 DIAGNOSIS — I25.10 CORONARY ARTERY DISEASE INVOLVING NATIVE CORONARY ARTERY OF NATIVE HEART WITHOUT ANGINA PECTORIS: Chronic | ICD-10-CM

## 2020-02-03 DIAGNOSIS — I10 ESSENTIAL HYPERTENSION: ICD-10-CM

## 2020-02-03 DIAGNOSIS — Z95.5 S/P CORONARY ARTERY STENT PLACEMENT: Chronic | ICD-10-CM

## 2020-02-03 DIAGNOSIS — E11.9 TYPE 2 DIABETES MELLITUS WITHOUT COMPLICATION: ICD-10-CM

## 2020-02-03 DIAGNOSIS — E78.5 DYSLIPIDEMIA: ICD-10-CM

## 2020-02-04 RX ORDER — CLOPIDOGREL BISULFATE 75 MG/1
TABLET ORAL
Qty: 90 TABLET | Refills: 4 | Status: SHIPPED | OUTPATIENT
Start: 2020-02-04 | End: 2021-08-18 | Stop reason: SDUPTHER

## 2020-02-14 ENCOUNTER — PATIENT MESSAGE (OUTPATIENT)
Dept: CARDIOLOGY | Facility: CLINIC | Age: 75
End: 2020-02-14

## 2020-02-14 ENCOUNTER — TELEPHONE (OUTPATIENT)
Dept: CARDIOLOGY | Facility: CLINIC | Age: 75
End: 2020-02-14

## 2020-02-14 RX ORDER — FENOFIBRATE 54 MG/1
TABLET ORAL
Qty: 90 TABLET | Refills: 4 | Status: SHIPPED | OUTPATIENT
Start: 2020-02-14 | End: 2020-08-13

## 2020-02-14 RX ORDER — FENOFIBRATE 54 MG/1
54 TABLET ORAL DAILY
Qty: 90 TABLET | Refills: 4 | Status: SHIPPED | OUTPATIENT
Start: 2020-02-14 | End: 2020-11-16

## 2020-02-14 NOTE — TELEPHONE ENCOUNTER
----- Message from Laxmi Herr sent at 2/14/2020 12:57 PM CST -----  Contact: Vipul Dupont  Type: Needs Medical Advice    Who Called:  Vipul Funes Call Back Number: [phone]  Additional Information: Requesting a call back regarding getting a verbal for pt medication fenofibrate (TRICOR) 54 MG tablet . He saying they never received it   Please Advise ---Thank you

## 2020-02-19 ENCOUNTER — PATIENT MESSAGE (OUTPATIENT)
Dept: PAIN MEDICINE | Facility: CLINIC | Age: 75
End: 2020-02-19

## 2020-02-19 ENCOUNTER — OFFICE VISIT (OUTPATIENT)
Dept: PAIN MEDICINE | Facility: CLINIC | Age: 75
End: 2020-02-19
Payer: MEDICARE

## 2020-02-19 VITALS
OXYGEN SATURATION: 98 % | RESPIRATION RATE: 20 BRPM | DIASTOLIC BLOOD PRESSURE: 63 MMHG | SYSTOLIC BLOOD PRESSURE: 137 MMHG | HEIGHT: 65 IN | BODY MASS INDEX: 35.35 KG/M2 | WEIGHT: 212.19 LBS | HEART RATE: 63 BPM | TEMPERATURE: 97 F

## 2020-02-19 DIAGNOSIS — E11.9 TYPE 2 DIABETES MELLITUS WITHOUT COMPLICATION, WITHOUT LONG-TERM CURRENT USE OF INSULIN: ICD-10-CM

## 2020-02-19 DIAGNOSIS — E66.01 SEVERE OBESITY (BMI 35.0-39.9) WITH COMORBIDITY: ICD-10-CM

## 2020-02-19 DIAGNOSIS — M51.36 DDD (DEGENERATIVE DISC DISEASE), LUMBAR: Primary | ICD-10-CM

## 2020-02-19 DIAGNOSIS — M50.30 DDD (DEGENERATIVE DISC DISEASE), CERVICAL: ICD-10-CM

## 2020-02-19 DIAGNOSIS — M47.812 CERVICAL SPONDYLOSIS: ICD-10-CM

## 2020-02-19 DIAGNOSIS — M54.16 LUMBAR RADICULOPATHY, CHRONIC: ICD-10-CM

## 2020-02-19 DIAGNOSIS — Z79.02 ANTIPLATELET OR ANTITHROMBOTIC LONG-TERM USE: ICD-10-CM

## 2020-02-19 DIAGNOSIS — M48.02 SPINAL STENOSIS OF CERVICAL REGION: ICD-10-CM

## 2020-02-19 PROCEDURE — 99204 PR OFFICE/OUTPT VISIT, NEW, LEVL IV, 45-59 MIN: ICD-10-PCS | Mod: S$GLB,,, | Performed by: ANESTHESIOLOGY

## 2020-02-19 PROCEDURE — 1125F AMNT PAIN NOTED PAIN PRSNT: CPT | Mod: S$GLB,,, | Performed by: ANESTHESIOLOGY

## 2020-02-19 PROCEDURE — 3078F DIAST BP <80 MM HG: CPT | Mod: CPTII,S$GLB,, | Performed by: ANESTHESIOLOGY

## 2020-02-19 PROCEDURE — 1159F PR MEDICATION LIST DOCUMENTED IN MEDICAL RECORD: ICD-10-PCS | Mod: S$GLB,,, | Performed by: ANESTHESIOLOGY

## 2020-02-19 PROCEDURE — 1125F PR PAIN SEVERITY QUANTIFIED, PAIN PRESENT: ICD-10-PCS | Mod: S$GLB,,, | Performed by: ANESTHESIOLOGY

## 2020-02-19 PROCEDURE — 3075F SYST BP GE 130 - 139MM HG: CPT | Mod: CPTII,S$GLB,, | Performed by: ANESTHESIOLOGY

## 2020-02-19 PROCEDURE — 99999 PR PBB SHADOW E&M-EST. PATIENT-LVL V: ICD-10-PCS | Mod: PBBFAC,,, | Performed by: ANESTHESIOLOGY

## 2020-02-19 PROCEDURE — 1159F MED LIST DOCD IN RCRD: CPT | Mod: S$GLB,,, | Performed by: ANESTHESIOLOGY

## 2020-02-19 PROCEDURE — 99204 OFFICE O/P NEW MOD 45 MIN: CPT | Mod: S$GLB,,, | Performed by: ANESTHESIOLOGY

## 2020-02-19 PROCEDURE — 3075F PR MOST RECENT SYSTOLIC BLOOD PRESS GE 130-139MM HG: ICD-10-PCS | Mod: CPTII,S$GLB,, | Performed by: ANESTHESIOLOGY

## 2020-02-19 PROCEDURE — 3078F PR MOST RECENT DIASTOLIC BLOOD PRESSURE < 80 MM HG: ICD-10-PCS | Mod: CPTII,S$GLB,, | Performed by: ANESTHESIOLOGY

## 2020-02-19 PROCEDURE — 1101F PR PT FALLS ASSESS DOC 0-1 FALLS W/OUT INJ PAST YR: ICD-10-PCS | Mod: CPTII,S$GLB,, | Performed by: ANESTHESIOLOGY

## 2020-02-19 PROCEDURE — 1101F PT FALLS ASSESS-DOCD LE1/YR: CPT | Mod: CPTII,S$GLB,, | Performed by: ANESTHESIOLOGY

## 2020-02-19 PROCEDURE — 99999 PR PBB SHADOW E&M-EST. PATIENT-LVL V: CPT | Mod: PBBFAC,,, | Performed by: ANESTHESIOLOGY

## 2020-02-19 NOTE — PROGRESS NOTES
This note was completed with dictation software and grammatical errors may exist.    CC:Neck pain, back and right hip pain    HPI:  The patient is a 74-year-old woman with history of diabetes, CAD status post CABG and stents, chronic anti-platelet therapy, presents in self-referral for back and neck pain. The patient reports that she has had arthritis for many years, states that she has been seeing Rheumatology, Dr. Sims for the last several years but had seen Dr. Wharton prior to that.  I see that she has a history of osteoarthritis but I do not see any autoimmune disorders.  She complains of pain in the midline neck, into the scapula.  She denies any radiation of pain down into her arms, no weakness in the arms.  She does report numbness in her hands, primarily when waking up in the morning.  She has pain with lateral rotation to either side, causes significant scapular pain.  She denies any weakness in the legs, denies any balance issues.  She states that she had seen a surgeon in the past who told her that she may need surgery, she should be careful if she fell due to concern for paralysis.    Her other complaint is midline low back pain and pain radiating along her right lateral thigh.  She states that she has had this for many years, states that she had an injury in which she felt a pop many years ago.  She states that while she has had pain on and off, she had a severe flare up in low back about 6 months ago, slightly better than it was previously.  She reports having numbness in the middle 3 toes.  She describes her pain as tight, deep and sharp.  She states that the pain is worse with standing and walking, doing any lifting or bending, getting out of a bed or a chair.  She has had some relief with IM steroid injections, Voltaren gel and Tylenol.    Pain intervention history:  She has had significant side effects with tramadol which made her dizzy.  She gets hives and reactions to other opioid medication.   She has done physical therapy numerous times, has done aquatic therapy in the past with benefit.    ROS:  She reports shortness of breath, wheezing, easy bruising, urinary frequency and back pain.  Balance of review of systems is negative.    Past Medical History:   Diagnosis Date    Anticoagulant long-term use     Plavix and ASA therapy    Arthritis     Asthma     seasonal, allergies    Blood in stool     Blood transfusion     Coronary artery disease     stent X1    Coronary artery disease involving native coronary artery of native heart without angina pectoris 6/1/2016    Diabetes mellitus     Dyslipidemia 6/1/2016    GERD (gastroesophageal reflux disease)     Hypertension     S/P CABG (coronary artery bypass graft) x 3 1991 6/1/2016    S/P CABG  x 3 1991 LIMA-LAD, VG-D (occluded), VG-RCA    S/P coronary artery stent placement 6/1/2016 7/2016 PDA- 2.25 x 24, synergy  8/15 lad/diag promus 2.5x16  8/2009 vg-rca- promus 3.5x8    Statin intolerance 11/30/2017    Failed prava/lipitor/crestor. Tolerates livalo    SVT (supraventricular tachycardia) 4/24/2017 6/2017 EPS 1.  Normal baseline intervals. 2.  No evidence of an accessory pathway. 3.  Atrial tachycardia mapped to the posterior aspect near the IVC, status post radiofrequency ablation.       Past Surgical History:   Procedure Laterality Date    ACHILLES TENDON SURGERY Left     torn tendon    CARDIAC SURGERY      x 4--22 years ago    cataract surgery      CHOLECYSTECTOMY      cholescystectomy      COLONOSCOPY  7/20/2006  [facility]    One 1 to 2 mm polyp in the proximal descending colon.  HYPERPLASTIC POLYP.    Internal hemorrhoids.    CORONARY STENT PLACEMENT      2009 X 1; 2014 X 1    HYSTERECTOMY         Social History     Socioeconomic History    Marital status:      Spouse name: Not on file    Number of children: Not on file    Years of education: Not on file    Highest education level: Not on file   Occupational History     "Not on file   Social Needs    Financial resource strain: Not on file    Food insecurity:     Worry: Not on file     Inability: Not on file    Transportation needs:     Medical: Not on file     Non-medical: Not on file   Tobacco Use    Smoking status: Never Smoker    Smokeless tobacco: Never Used   Substance and Sexual Activity    Alcohol use: Yes     Comment: 3 glasses wine daily    Drug use: No    Sexual activity: Yes     Partners: Male     Birth control/protection: Surgical     Comment: hysterectomy    Lifestyle    Physical activity:     Days per week: Not on file     Minutes per session: Not on file    Stress: Not on file   Relationships    Social connections:     Talks on phone: Not on file     Gets together: Not on file     Attends [employer] service: Not on file     Active member of club or organization: Not on file     Attends meetings of clubs or organizations: Not on file     Relationship status: Not on file   Other Topics Concern    Are you pregnant or think you may be? Not Asked    Breast-feeding Not Asked   Social History Narrative    Not on file         Medications/Allergies: See med card    Vitals:    02/19/20 0828   BP: 137/63   Pulse: 63   Resp: 20   Temp: 97.3 °F (36.3 °C)   TempSrc: Oral   SpO2: 98%   Weight: 96.2 kg (212 lb 3.1 oz)   Height: 5' 5" (1.651 m)   PainSc:   4   PainLoc: Back     Body mass index is 35.31 kg/m².      Physical exam:  Gen: A and O x3, pleasant, well-groomed, significant central adiposity   Skin: No rashes or obvious lesions  HEENT: PERRLA, no obvious deformities on ears or in canals. Trachea midline.  CVS: Regular rate and rhythm, normal palpable pulses.  Resp:No increased work of breathing, symmetrical chest rise.  Abdomen: Soft, NT/ND.  Musculoskeletal: Able to heel walk, toe walk. No antalgic gait.     Neuro:  Lower extremities: 5/5 strength bilaterally  Reflexes: Patellar 2+, Achilles 2+ bilaterally.  Sensory:  Intact and symmetrical to light touch and " pinprick in L2-S1 dermatomes bilaterally.    Lumbar spine:  Lumbar spine:  Range of motion was mildly reduced with forward flexion with no increased pain, moderately reduced with extension especially with oblique extension to either side, radiates into right lateral hip with extension.  Miah's test causes no increased pain on either side.    Supine straight leg raise is negative bilaterally.    Internal and external rotation of the hip causes no increased pain on either side.  Myofascial exam: No tenderness to palpation across lumbar paraspinous muscles.    Neuro:  Upper extremities: 5/5 strength bilaterally   Reflexes: Brachioradialis 1+, Bicep 0+, Tricep 0+.   Sensory: Intact and symmetrical to light touch and pinprick in C2-T1 dermatomes bilaterally.    Cervical Spine:  Cervical spine:  Range of motion is moderately decreased with lateral rotation to either side and with extension with increased pain on each of these maneuvers, mildly decreased with forward flexion with no increased pain.  Spurling's maneuver causes neck pain to either side.  Myofascial exam:  There is some tenderness to palpation over the cervical paraspinous and trapezius muscles bilaterally.  No tenderness into the rhomboid muscles.    Imaging:  None    Assessment:  The patient is a 74-year-old woman with history of diabetes, CAD status post CABG and stents, chronic anti-platelet therapy, presents in self-referral for back and neck pain.    1. DDD (degenerative disc disease), lumbar  MRI Lumbar Spine Without Contrast   2. Spinal stenosis of cervical region  MRI Cervical Spine Without Contrast   3. Lumbar radiculopathy, chronic  MRI Lumbar Spine Without Contrast   4. DDD (degenerative disc disease), cervical  MRI Cervical Spine Without Contrast   5. Cervical spondylosis  MRI Cervical Spine Without Contrast   6. Severe obesity (BMI 35.0-39.9) with comorbidity     7. Type 2 diabetes mellitus without complication, without long-term current use  of insulin     8. Antiplatelet or antithrombotic long-term use           Plan:  1.  I am going to get her set up with an MRI of the cervical spine, I suspect that she has spondylosis but which she has explained in her history is that it sounds like a surgeon had seen her and was concerned about stenosis.  We will have her follow up in several weeks to review this.  2.  As far as her low back, she seems to be having right leg radicular pain, possibly L3 or L4, I am going to have her get an MRI of the lumbar spine and follow up in several weeks to review this.  3.  We may discuss interventional procedures but also she would need to get approval to discontinue aspirin and Plavix.  She states that her glucose is doing well, does not recall her last hemoglobin A1c but it sounds like her sugars run in the 110s.  4.  I think she would be a good candidate for aquatic therapy, we can discuss this in follow-up.    Thank you for referring this interesting patient, and I look forward to continuing to collaborate in her care.

## 2020-02-24 ENCOUNTER — PATIENT MESSAGE (OUTPATIENT)
Dept: CARDIOLOGY | Facility: CLINIC | Age: 75
End: 2020-02-24

## 2020-03-05 ENCOUNTER — HOSPITAL ENCOUNTER (OUTPATIENT)
Dept: RADIOLOGY | Facility: HOSPITAL | Age: 75
Discharge: HOME | End: 2020-03-05
Attending: ANESTHESIOLOGY
Payer: MEDICARE

## 2020-03-05 DIAGNOSIS — M54.16 LUMBAR RADICULOPATHY, CHRONIC: ICD-10-CM

## 2020-03-05 DIAGNOSIS — M51.36 DDD (DEGENERATIVE DISC DISEASE), LUMBAR: ICD-10-CM

## 2020-03-05 DIAGNOSIS — M50.30 DDD (DEGENERATIVE DISC DISEASE), CERVICAL: ICD-10-CM

## 2020-03-05 DIAGNOSIS — M48.02 SPINAL STENOSIS OF CERVICAL REGION: ICD-10-CM

## 2020-03-05 DIAGNOSIS — M47.812 CERVICAL SPONDYLOSIS: ICD-10-CM

## 2020-03-05 PROCEDURE — 72148 MRI LUMBAR SPINE W/O DYE: CPT | Mod: 26,,, | Performed by: RADIOLOGY

## 2020-03-05 PROCEDURE — 72141 MRI NECK SPINE W/O DYE: CPT | Mod: 26,,, | Performed by: RADIOLOGY

## 2020-03-05 PROCEDURE — 72141 MRI NECK SPINE W/O DYE: CPT | Mod: TC,PO

## 2020-03-05 PROCEDURE — 72148 MRI LUMBAR SPINE W/O DYE: CPT | Mod: TC,PO

## 2020-03-05 PROCEDURE — 72141 MRI CERVICAL SPINE WITHOUT CONTRAST: ICD-10-PCS | Mod: 26,,, | Performed by: RADIOLOGY

## 2020-03-05 PROCEDURE — 72148 MRI LUMBAR SPINE WITHOUT CONTRAST: ICD-10-PCS | Mod: 26,,, | Performed by: RADIOLOGY

## 2020-03-17 ENCOUNTER — TELEPHONE (OUTPATIENT)
Dept: PAIN MEDICINE | Facility: CLINIC | Age: 75
End: 2020-03-17

## 2020-03-17 DIAGNOSIS — M17.0 PRIMARY OSTEOARTHRITIS OF BOTH KNEES: ICD-10-CM

## 2020-03-17 DIAGNOSIS — R26.89 ABNORMALITY OF GAIT DUE TO IMPAIRMENT OF BALANCE: Primary | ICD-10-CM

## 2020-03-17 RX ORDER — GABAPENTIN 300 MG/1
300 CAPSULE ORAL NIGHTLY
Qty: 30 CAPSULE | Refills: 2 | Status: SHIPPED | OUTPATIENT
Start: 2020-03-17 | End: 2020-06-24

## 2020-03-17 NOTE — TELEPHONE ENCOUNTER
I did telephone visit with the patient, please contact her in a month to see how she is doing.  My recommendation would be to set up an L5/S1 ATHIRA and also set up physical therapy for her back and her neck.  I also sent a prescription for gabapentin 300 milligrams to take at night for her bilateral arm numbness and tingling, right leg pain.  I am also going to see if we can get her and elevated seat for the toilet could she is having problems getting up and down off of the toilet.

## 2020-03-17 NOTE — TELEPHONE ENCOUNTER
Please let the patient know that we are going to get her a bedside commode.  This is covered by insurance whereas the elevated toilet seat would not be.  She will be able to use this over the top of the toilet and it has handles so should work for what she had requested.

## 2020-03-17 NOTE — TELEPHONE ENCOUNTER
Spoke with patient regarding doctor's note. Patient stated she forgot to mention at her appointment with Dr. Montoya that her right ankle gives out when she bears weight on it. She stated this doesn't happen frequently but its enough to scare her.

## 2020-03-29 ENCOUNTER — PATIENT MESSAGE (OUTPATIENT)
Dept: PAIN MEDICINE | Facility: CLINIC | Age: 75
End: 2020-03-29

## 2020-03-30 NOTE — TELEPHONE ENCOUNTER
Yes, if she is having dizziness or any shortness of breath definitely stop the medication.  What is make sure that she is not having any other issues so have her stop for 1 week and then get back to us and let us know

## 2020-04-05 ENCOUNTER — PATIENT MESSAGE (OUTPATIENT)
Dept: PAIN MEDICINE | Facility: CLINIC | Age: 75
End: 2020-04-05

## 2020-04-06 NOTE — TELEPHONE ENCOUNTER
Yes, unfortunately there is not a lot more that we can do.  She has side effects to most of the medications that we would use.  Otherwise we need to wait until the viral pandemic is over so that we can get her set up with physical therapy and an injection.

## 2020-04-27 RX ORDER — PITAVASTATIN CALCIUM 4.18 MG/1
TABLET, FILM COATED ORAL
Qty: 30 TABLET | Refills: 4 | Status: SHIPPED | OUTPATIENT
Start: 2020-04-27 | End: 2020-08-13

## 2020-04-30 ENCOUNTER — PATIENT MESSAGE (OUTPATIENT)
Dept: PAIN MEDICINE | Facility: CLINIC | Age: 75
End: 2020-04-30

## 2020-04-30 NOTE — TELEPHONE ENCOUNTER
I think we should go ahead and get her set up for the L5/S1 TAHIRA that we had recommended in the past.  Please set her up as a Tier 2, she is currently on the Tier 3 list I believe

## 2020-05-01 ENCOUNTER — TELEPHONE (OUTPATIENT)
Dept: PAIN MEDICINE | Facility: CLINIC | Age: 75
End: 2020-05-01

## 2020-05-01 NOTE — TELEPHONE ENCOUNTER
This patient is going to be scheduled for an epidural steroid injection and she will need to hold her aspirin and plavix for 7 days prior please advise.

## 2020-05-05 DIAGNOSIS — Z13.9 SCREENING PROCEDURE: Primary | ICD-10-CM

## 2020-05-05 DIAGNOSIS — M54.16 LUMBAR RADICULOPATHY: Primary | ICD-10-CM

## 2020-05-05 RX ORDER — ALPRAZOLAM 0.5 MG/1
1 TABLET, ORALLY DISINTEGRATING ORAL ONCE AS NEEDED
Status: CANCELLED | OUTPATIENT
Start: 2020-05-19 | End: 2031-10-15

## 2020-05-05 NOTE — TELEPHONE ENCOUNTER
Spoke with patient and the lumbar TAHIRA has been scheduled on 5/19. Pre-op instructions were reviewed and sent to patient. The covid testing has been scheduled 2 days prior.

## 2020-05-14 ENCOUNTER — PATIENT MESSAGE (OUTPATIENT)
Dept: SURGERY | Facility: HOSPITAL | Age: 75
End: 2020-05-14

## 2020-05-15 ENCOUNTER — PATIENT MESSAGE (OUTPATIENT)
Dept: DERMATOLOGY | Facility: CLINIC | Age: 75
End: 2020-05-15

## 2020-05-17 ENCOUNTER — LAB VISIT (OUTPATIENT)
Dept: FAMILY MEDICINE | Facility: CLINIC | Age: 75
End: 2020-05-17
Attending: ANESTHESIOLOGY
Payer: MEDICARE

## 2020-05-17 DIAGNOSIS — Z13.9 SCREENING PROCEDURE: ICD-10-CM

## 2020-05-17 PROCEDURE — U0003 INFECTIOUS AGENT DETECTION BY NUCLEIC ACID (DNA OR RNA); SEVERE ACUTE RESPIRATORY SYNDROME CORONAVIRUS 2 (SARS-COV-2) (CORONAVIRUS DISEASE [COVID-19]), AMPLIFIED PROBE TECHNIQUE, MAKING USE OF HIGH THROUGHPUT TECHNOLOGIES AS DESCRIBED BY CMS-2020-01-R: HCPCS

## 2020-05-18 ENCOUNTER — PATIENT MESSAGE (OUTPATIENT)
Dept: PAIN MEDICINE | Facility: CLINIC | Age: 75
End: 2020-05-18

## 2020-05-18 LAB — SARS-COV-2 RNA RESP QL NAA+PROBE: NOT DETECTED

## 2020-05-19 ENCOUNTER — HOSPITAL ENCOUNTER (OUTPATIENT)
Dept: RADIOLOGY | Facility: HOSPITAL | Age: 75
Discharge: HOME | End: 2020-05-19
Attending: ANESTHESIOLOGY | Admitting: ANESTHESIOLOGY
Payer: MEDICARE

## 2020-05-19 ENCOUNTER — HOSPITAL ENCOUNTER (OUTPATIENT)
Facility: HOSPITAL | Age: 75
Discharge: HOME | End: 2020-05-19
Attending: ANESTHESIOLOGY | Admitting: ANESTHESIOLOGY
Payer: MEDICARE

## 2020-05-19 VITALS
OXYGEN SATURATION: 98 % | RESPIRATION RATE: 19 BRPM | TEMPERATURE: 98 F | DIASTOLIC BLOOD PRESSURE: 64 MMHG | SYSTOLIC BLOOD PRESSURE: 155 MMHG | HEART RATE: 64 BPM

## 2020-05-19 DIAGNOSIS — M51.36 DDD (DEGENERATIVE DISC DISEASE), LUMBAR: ICD-10-CM

## 2020-05-19 DIAGNOSIS — M54.16 LUMBAR RADICULOPATHY: Primary | ICD-10-CM

## 2020-05-19 PROCEDURE — 62323 NJX INTERLAMINAR LMBR/SAC: CPT | Mod: PO | Performed by: ANESTHESIOLOGY

## 2020-05-19 PROCEDURE — 62323 PR INJ LUMBAR/SACRAL, W/IMAGING GUIDANCE: ICD-10-PCS | Mod: ,,, | Performed by: ANESTHESIOLOGY

## 2020-05-19 PROCEDURE — 62323 NJX INTERLAMINAR LMBR/SAC: CPT | Mod: ,,, | Performed by: ANESTHESIOLOGY

## 2020-05-19 PROCEDURE — 63600175 PHARM REV CODE 636 W HCPCS: Mod: PO | Performed by: ANESTHESIOLOGY

## 2020-05-19 PROCEDURE — A4216 STERILE WATER/SALINE, 10 ML: HCPCS | Mod: PO | Performed by: ANESTHESIOLOGY

## 2020-05-19 PROCEDURE — 25500020 PHARM REV CODE 255: Mod: PO | Performed by: ANESTHESIOLOGY

## 2020-05-19 PROCEDURE — 76000 FLUOROSCOPY <1 HR PHYS/QHP: CPT | Mod: TC,PO

## 2020-05-19 PROCEDURE — 25000003 PHARM REV CODE 250: Mod: PO | Performed by: ANESTHESIOLOGY

## 2020-05-19 RX ORDER — LIDOCAINE HYDROCHLORIDE 10 MG/ML
INJECTION, SOLUTION EPIDURAL; INFILTRATION; INTRACAUDAL; PERINEURAL
Status: DISCONTINUED | OUTPATIENT
Start: 2020-05-19 | End: 2020-05-19 | Stop reason: HOSPADM

## 2020-05-19 RX ORDER — METHYLPREDNISOLONE ACETATE 80 MG/ML
INJECTION, SUSPENSION INTRA-ARTICULAR; INTRALESIONAL; INTRAMUSCULAR; SOFT TISSUE
Status: DISCONTINUED | OUTPATIENT
Start: 2020-05-19 | End: 2020-05-19 | Stop reason: HOSPADM

## 2020-05-19 RX ORDER — SODIUM CHLORIDE 9 MG/ML
INJECTION, SOLUTION INTRAMUSCULAR; INTRAVENOUS; SUBCUTANEOUS
Status: DISCONTINUED | OUTPATIENT
Start: 2020-05-19 | End: 2020-05-19 | Stop reason: HOSPADM

## 2020-05-19 RX ORDER — ALPRAZOLAM 0.5 MG/1
1 TABLET, ORALLY DISINTEGRATING ORAL ONCE AS NEEDED
Status: COMPLETED | OUTPATIENT
Start: 2020-05-19 | End: 2020-05-19

## 2020-05-19 RX ADMIN — ALPRAZOLAM 1 MG: 0.5 TABLET, ORALLY DISINTEGRATING ORAL at 12:05

## 2020-05-19 NOTE — OP NOTE

## 2020-05-19 NOTE — H&P
CC: Back pain    HPI: The patient is a 75yo woman with a history of lumbar radiculopathy here for L5/S1 TAHIRA. There are no major changes in history and physical from 2/19/20.    Past Medical History:   Diagnosis Date    Anticoagulant long-term use     Plavix and ASA therapy    Arthritis     Asthma     seasonal, allergies    Blood in stool     Blood transfusion     Coronary artery disease     stent X1    Coronary artery disease involving native coronary artery of native heart without angina pectoris 6/1/2016    Diabetes mellitus     Dyslipidemia 6/1/2016    GERD (gastroesophageal reflux disease)     Hypertension     S/P CABG (coronary artery bypass graft) x 3 1991 6/1/2016    S/P CABG  x 3 1991 LIMA-LAD, VG-D (occluded), VG-RCA    S/P coronary artery stent placement 6/1/2016 7/2016 PDA- 2.25 x 24, synergy  8/15 lad/diag promus 2.5x16  8/2009 vg-rca- promus 3.5x8    Statin intolerance 11/30/2017    Failed prava/lipitor/crestor. Tolerates livalo    SVT (supraventricular tachycardia) 4/24/2017 6/2017 EPS 1.  Normal baseline intervals. 2.  No evidence of an accessory pathway. 3.  Atrial tachycardia mapped to the posterior aspect near the IVC, status post radiofrequency ablation.       Past Surgical History:   Procedure Laterality Date    ACHILLES TENDON SURGERY Left     torn tendon    CARDIAC SURGERY      x 4--22 years ago    cataract surgery      CHOLECYSTECTOMY      cholescystectomy      COLONOSCOPY  7/20/2006  [facility]    One 1 to 2 mm polyp in the proximal descending colon.  HYPERPLASTIC POLYP.    Internal hemorrhoids.    CORONARY STENT PLACEMENT      2009 X 1; 2014 X 1    HYSTERECTOMY         Family History   Problem Relation Age of Onset    Hypertension Sister     Heart disease Brother     Hypertension Brother     Coronary artery disease Brother     Obesity Brother     Hypertension Maternal Grandmother     Heart disease Maternal Grandmother     Heart attack Maternal Grandmother      Hypertension Maternal Grandfather     Heart disease Maternal Grandfather     Breast cancer Neg Hx     Ovarian cancer Neg Hx        Social History     Socioeconomic History    Marital status:      Spouse name: Not on file    Number of children: Not on file    Years of education: Not on file    Highest education level: Not on file   Occupational History    Not on file   Social Needs    Financial resource strain: Not on file    Food insecurity:     Worry: Not on file     Inability: Not on file    Transportation needs:     Medical: Not on file     Non-medical: Not on file   Tobacco Use    Smoking status: Never Smoker    Smokeless tobacco: Never Used   Substance and Sexual Activity    Alcohol use: Yes     Comment: 3 glasses wine daily    Drug use: No    Sexual activity: Yes     Partners: Male     Birth control/protection: Surgical     Comment: hysterectomy    Lifestyle    Physical activity:     Days per week: Not on file     Minutes per session: Not on file    Stress: Not on file   Relationships    Social connections:     Talks on phone: Not on file     Gets together: Not on file     Attends Faith service: Not on file     Active member of club or organization: Not on file     Attends meetings of clubs or organizations: Not on file     Relationship status: Not on file   Other Topics Concern    Are you pregnant or think you may be? Not Asked    Breast-feeding Not Asked   Social History Narrative    Not on file       No current facility-administered medications for this encounter.        Review of patient's allergies indicates:   Allergen Reactions    Oxycodone-acetaminophen Hives    Digitalis glycosides Other (See Comments)     Severe joint pain    Digoxin Other (See Comments)     Numbness and painful to move    Statins-hmg-coa reductase inhibitors      Myalgias, alpoecia    Erythromycin Other (See Comments)     Pt reports tachycardia and andres skin to neck and face    Latex, natural  rubber Nausea Only and Rash     Nausea at dentist    Penicillins Rash    Sulfa (sulfonamide antibiotics) Rash       Vitals:    05/19/20 1212   BP: (!) 152/69   Pulse: 65   Resp: 18   Temp: 98.2 °F (36.8 °C)   TempSrc: Skin   SpO2: 97%       REVIEW OF SYSTEMS:     GENERAL: No weight loss, malaise or fevers.  HEENT:  No recent changes in vision or hearing  NECK: Negative for lumps, no difficulty with swallowing.  RESPIRATORY: Negative for cough, wheezing or shortness of breath, patient denies any recent URI.  CARDIOVASCULAR: Negative for chest pain, leg swelling or palpitations.  GI: Negative for abdominal discomfort, blood in stools or black stools or change in bowel habits.  MUSCULOSKELETAL: See HPI.  SKIN: Negative for lesions, rash, and itching.  PSYCH: No suicidal or homicidal ideations, no current mood disturbances.  HEMATOLOGY/LYMPHOLOGY: Negative for prolonged bleeding, bruising easily or swollen nodes. Patient is not currently taking any anti-coagulants  ENDO: No history of diabetes or thyroid dysfunction  NEURO: No history of syncope, paralysis, seizures or tremors.All other reviewed and negative other than HPI.    Physical exam:  Gen: A and O x3, pleasant, well-groomed  Skin: No rashes or obvious lesions  HEENT: PERRLA, no obvious deformities on ears or in canals. No thyroid masses, trachea midline, no palpable lymph nodes in neck, axilla.  CVS: Regular rate and rhythm, normal S1 and S2, no murmurs.  Resp: Clear to auscultation bilaterally.  Abdomen: Soft, NT/ND, normal bowel sounds present.  Musculoskeletal/Neuro: Moving all extremities    Assessment:  Lumbar radiculopathy  -     Case Request Operating Room: Injection-steroid-epidural-lumbar L5/S1  -     Place in Outpatient; Standing  -     Diet NPO; Standing  -     alprazolam ODT dissolvable tablet 1 mg  -     Notify physician ; Standing  -     Notify physician ; Standing  -     Notify physician (specify); Standing  -     Verify informed consent;  Standing  -     Vital signs; Standing    Other orders  -     Progressive Mobility Protocol (mobilize patient to their highest level of functioning at least twice daily); Standing  -     IP VTE LOW RISK PATIENT; Standing  -     iohexoL (OMNIPAQUE 300) injection  -     lidocaine (PF) 10 mg/ml (1%) injection  -     methylPREDNISolone acetate injection  -     sodium chloride 0.9% injection

## 2020-05-19 NOTE — DISCHARGE SUMMARY
[facility]  Discharge Note  Short Stay    Admit Date: 5/19/2020    Discharge Date: 5/19/2020    Attending Physician: Mannie Montoya MD     Discharge Provider: Mannie Montoya    Diagnoses:  Active Hospital Problems    Diagnosis  POA    *Lumbar radiculopathy [M54.16]  Yes      Resolved Hospital Problems   No resolved problems to display.       Discharged Condition: good    Final Diagnoses: Lumbar radiculopathy [M54.16]    Disposition: Home or Self Care    Hospital Course: no complications, uneventful    Outcome of Hospitalization, Treatment, Procedure, or Surgery:  Patient was admitted for outpatient procedure. The patient underwent procedure without complications and are discharged home    Follow up/Patient Instructions:  Follow up as scheduled in Pain Management clinic in 3-4 weeks/Patient has received instructions and follow up date and time    Medications:  Continue previous medications    Discharge Procedure Orders   Call MD for:  temperature >100.4     Call MD for:  severe uncontrolled pain     Call MD for:  redness, tenderness, or signs of infection (pain, swelling, redness, odor or green/yellow discharge around incision site)     Call MD for:  severe persistent headache     No dressing needed         Discharge Procedure Orders (must include Diet, Follow-up, Activity):   Discharge Procedure Orders (must include Diet, Follow-up, Activity)   Call MD for:  temperature >100.4     Call MD for:  severe uncontrolled pain     Call MD for:  redness, tenderness, or signs of infection (pain, swelling, redness, odor or green/yellow discharge around incision site)     Call MD for:  severe persistent headache     No dressing needed

## 2020-06-15 ENCOUNTER — OFFICE VISIT (OUTPATIENT)
Dept: PAIN MEDICINE | Facility: CLINIC | Age: 75
End: 2020-06-15
Payer: MEDICARE

## 2020-06-15 VITALS
OXYGEN SATURATION: 98 % | RESPIRATION RATE: 18 BRPM | TEMPERATURE: 99 F | HEART RATE: 63 BPM | BODY MASS INDEX: 34.5 KG/M2 | SYSTOLIC BLOOD PRESSURE: 117 MMHG | DIASTOLIC BLOOD PRESSURE: 56 MMHG | WEIGHT: 207.31 LBS

## 2020-06-15 DIAGNOSIS — Z11.9 SCREENING EXAMINATION FOR INFECTIOUS DISEASE: ICD-10-CM

## 2020-06-15 DIAGNOSIS — M47.816 LUMBAR SPONDYLOSIS: Primary | ICD-10-CM

## 2020-06-15 DIAGNOSIS — M51.36 DDD (DEGENERATIVE DISC DISEASE), LUMBAR: ICD-10-CM

## 2020-06-15 PROCEDURE — 99999 PR PBB SHADOW E&M-EST. PATIENT-LVL V: ICD-10-PCS | Mod: PBBFAC,,, | Performed by: NURSE PRACTITIONER

## 2020-06-15 PROCEDURE — 99999 PR PBB SHADOW E&M-EST. PATIENT-LVL V: CPT | Mod: PBBFAC,,, | Performed by: NURSE PRACTITIONER

## 2020-06-15 PROCEDURE — 99213 PR OFFICE/OUTPT VISIT, EST, LEVL III, 20-29 MIN: ICD-10-PCS | Mod: S$GLB,,, | Performed by: NURSE PRACTITIONER

## 2020-06-15 PROCEDURE — 99213 OFFICE O/P EST LOW 20 MIN: CPT | Mod: S$GLB,,, | Performed by: NURSE PRACTITIONER

## 2020-06-15 RX ORDER — SODIUM CHLORIDE, SODIUM LACTATE, POTASSIUM CHLORIDE, CALCIUM CHLORIDE 600; 310; 30; 20 MG/100ML; MG/100ML; MG/100ML; MG/100ML
INJECTION, SOLUTION INTRAVENOUS CONTINUOUS
Status: CANCELLED | OUTPATIENT
Start: 2020-06-25

## 2020-06-15 NOTE — PROGRESS NOTES
This note was completed with dictation software and grammatical errors may exist.    CC:low back pain    HPI:  The patient is a 74-year-old woman with history of diabetes, CAD status post CABG and stents, chronic anti-platelet therapy, presents in follow up.  She is s/p L5/S1 TAHIRA on 5/19/20 with about 2 weeks relief.  She complains of low back pain 3/10 that increases to 6/10 with standing, walking and extended car rides.  Her pain is constant, aching, dull across the low back with radiation into lateral hips.  She denies numbness, weakness, bowel/bladder dysfunction or loss of balance.  The pain is interfering with her mobility and ADL's.  She takes Tylenol prn.      Pain intervention history:  She has had significant side effects with tramadol which made her dizzy.  She gets hives and reactions to other opioid medication.  She has done physical therapy numerous times, has done aquatic therapy in the past with benefit.  She is s/p L5/S1 TAHIRA on 5/19/20 with about 2 weeks relief.     ROS:  She reports shortness of breath, wheezing, easy bruising, urinary frequency and back pain.  Balance of review of systems is negative.    Past Medical History:   Diagnosis Date    Anticoagulant long-term use     Plavix and ASA therapy    Arthritis     Asthma     seasonal, allergies    Blood in stool     Blood transfusion     Coronary artery disease     stent X1    Coronary artery disease involving native coronary artery of native heart without angina pectoris 6/1/2016    Diabetes mellitus     Dyslipidemia 6/1/2016    GERD (gastroesophageal reflux disease)     Hypertension     S/P CABG (coronary artery bypass graft) x 3 1991 6/1/2016    S/P CABG  x 3 1991 LIMA-LAD, VG-D (occluded), VG-RCA    S/P coronary artery stent placement 6/1/2016 7/2016 PDA- 2.25 x 24, synergy  8/15 lad/diag promus 2.5x16  8/2009 vg-rca- promus 3.5x8    Statin intolerance 11/30/2017    Failed prava/lipitor/crestor. Tolerates livalo    SVT  (supraventricular tachycardia) 4/24/2017 6/2017 EPS 1.  Normal baseline intervals. 2.  No evidence of an accessory pathway. 3.  Atrial tachycardia mapped to the posterior aspect near the IVC, status post radiofrequency ablation.       Past Surgical History:   Procedure Laterality Date    ACHILLES TENDON SURGERY Left     torn tendon    CARDIAC SURGERY      x 4--22 years ago    cataract surgery      CHOLECYSTECTOMY      cholescystectomy      COLONOSCOPY  7/20/2006  [facility]    One 1 to 2 mm polyp in the proximal descending colon.  HYPERPLASTIC POLYP.    Internal hemorrhoids.    CORONARY STENT PLACEMENT      2009 X 1; 2014 X 1    EPIDURAL STEROID INJECTION INTO LUMBAR SPINE N/A 5/19/2020    Procedure: Injection-steroid-epidural-lumbar L5/S1;  Surgeon: Mannie Montoya MD;  Location: [facility] OR;  Service: Pain Management;  Laterality: N/A;    HYSTERECTOMY         Social History     Socioeconomic History    Marital status:      Spouse name: Not on file    Number of children: Not on file    Years of education: Not on file    Highest education level: Not on file   Occupational History    Not on file   Social Needs    Financial resource strain: Not on file    Food insecurity     Worry: Not on file     Inability: Not on file    Transportation needs     Medical: Not on file     Non-medical: Not on file   Tobacco Use    Smoking status: Never Smoker    Smokeless tobacco: Never Used   Substance and Sexual Activity    Alcohol use: Yes     Comment: 3 glasses wine daily    Drug use: No    Sexual activity: Yes     Partners: Male     Birth control/protection: Surgical     Comment: hysterectomy    Lifestyle    Physical activity     Days per week: Not on file     Minutes per session: Not on file    Stress: Not on file   Relationships    Social connections     Talks on phone: Not on file     Gets together: Not on file     Attends Voodoo service: Not on file     Active member of club or organization:  Not on file     Attends meetings of clubs or organizations: Not on file     Relationship status: Not on file   Other Topics Concern    Are you pregnant or think you may be? Not Asked    Breast-feeding Not Asked   Social History Narrative    Not on file         Medications/Allergies: See med card    Vitals:    06/15/20 1042   BP: (!) 117/56   Pulse: 63   Resp: 18   Temp: 98.6 °F (37 °C)   TempSrc: Temporal   SpO2: 98%   Weight: 94 kg (207 lb 5.5 oz)   PainSc:   4   PainLoc: Back     Body mass index is 34.5 kg/m².      Physical exam:  Gen: A and O x3, pleasant, well-groomed, significant central adiposity   Skin: No rashes or obvious lesions  HEENT: PERRLA, no obvious deformities on ears or in canals. Trachea midline.  CVS: Regular rate and rhythm, normal palpable pulses.  Resp:No increased work of breathing, symmetrical chest rise.  Abdomen: Soft, NT/ND.  Musculoskeletal: Able to heel walk, toe walk. No antalgic gait.     Neuro:  Lower extremities: 5/5 strength bilaterally  Reflexes: Patellar 2+, Achilles 2+ bilaterally.  Sensory:  Intact and symmetrical to light touch and pinprick in L2-S1 dermatomes bilaterally.    Lumbar spine:  Lumbar spine:  Range of motion was mildly reduced with forward flexion with no increased pain, moderately reduced with extension especially with oblique extension to either side, radiates into right lateral hip with extension.  Miah's test causes no increased pain on either side.    Supine straight leg raise is negative bilaterally.    Internal and external rotation of the hip causes no increased pain on either side.  Myofascial exam: No tenderness to palpation across lumbar paraspinous muscles.    Neuro:  Upper extremities: 5/5 strength bilaterally   Reflexes: Brachioradialis 1+, Bicep 0+, Tricep 0+.   Sensory: Intact and symmetrical to light touch and pinprick in C2-T1 dermatomes bilaterally.    Cervical Spine:  Cervical spine:  Range of motion is moderately decreased with lateral  rotation to either side and with extension with increased pain on each of these maneuvers, mildly decreased with forward flexion with no increased pain.  Spurling's maneuver causes neck pain to either side.  Myofascial exam:  There is some tenderness to palpation over the cervical paraspinous and trapezius muscles bilaterally.  No tenderness into the rhomboid muscles.    Imaging:  Lumbar spine MRI  T12-L1: Central posterior disc osteophyte complex.  No spinal canal or neural foraminal stenosis.  L1-L2: Minimal diffuse disc bulge.  Mild bilateral facet arthropathy.  There is no neuroforaminal stenosis.  There is no spinal canal stenosis.  L2-L3: Mild disc space narrowing.  Diffuse disc bulge with posterior osteophytic ridging and superimposed small central disc extrusion.  Mild bilateral facet arthropathy.  Mild bilateral neural foraminal stenosis.  There is no spinal canal stenosis.  L3-L4: Mild-to-moderate disc space narrowing.  Diffuse disc bulge with posterior osteophytic ridging and superimposed small central disc extrusion.  Mild bilateral facet arthropathy.  Ligamentum flavum infolding.  Mild bilateral neural foraminal stenosis, right greater than left.  Mild-to-moderate spinal canal stenosis.  L4-L5: Mild-to-moderate disc space narrowing.  Diffuse disc bulge with posterior osteophytic ridging and superimposed central disc extrusion through an annular fissure, which contributes to lateral recess stenosis bilaterally.  Moderate bilateral facet arthropathy.  Ligamentum flavum infolding.  Mild bilateral neural foraminal stenosis, right greater than left.  Mild spinal canal stenosis.  L5-S1: Mild-to-moderate disc space narrowing.  Diffuse disc bulge with superimposed right subarticular to extraforaminal disc extrusion, which contributes to severe right neural foraminal stenosis and narrowing of the right lateral recess with abutment of the descending right S1 nerve root.  Moderate right and mild left facet  arthropathy.  Ligamentum flavum infolding.  Severe right and moderate left neural foraminal stenosis.  There is no spinal canal stenosis.     Assessment:  The patient is a 74-year-old woman with history of diabetes, CAD status post CABG and stents, chronic anti-platelet therapy, presents in self-referral for back and neck pain.    1. Lumbar spondylosis  Ambulatory referral/consult to Physical/Occupational Therapy    Vital signs    Place 18-22 gauage peripheral IV     Verify informed consent    Notify physician     Notify physician     Notify physician (specify)    Diet NPO    Place sequential compression device    Case Request Operating Room: Block-nerve-medial branch-lumbar    Place in Outpatient    lactated ringers infusion   2. Screening examination for infectious disease  COVID-19 Routine Screening   3. DDD (degenerative disc disease), lumbar           Plan:  1.  I believe the residual pain after LESI is due to her facet arthropathy.  Lumbar spine MRI is c/w lower level facet arthropathy. I will schedule her for a diagnostic bilateral medial branch block at L3-4, L4-5 and L5-S1.    2.  I have placed an order for physical therapy, she would like to return to [facility].  3.  We will call to evaluate her response to the MBB and proceed accordingly.

## 2020-06-15 NOTE — H&P (VIEW-ONLY)
This note was completed with dictation software and grammatical errors may exist.    CC:low back pain    HPI:  The patient is a 74-year-old woman with history of diabetes, CAD status post CABG and stents, chronic anti-platelet therapy, presents in follow up.  She is s/p L5/S1 TAHIRA on 5/19/20 with about 2 weeks relief.  She complains of low back pain 3/10 that increases to 6/10 with standing, walking and extended car rides.  Her pain is constant, aching, dull across the low back with radiation into lateral hips.  She denies numbness, weakness, bowel/bladder dysfunction or loss of balance.  The pain is interfering with her mobility and ADL's.  She takes Tylenol prn.      Pain intervention history:  She has had significant side effects with tramadol which made her dizzy.  She gets hives and reactions to other opioid medication.  She has done physical therapy numerous times, has done aquatic therapy in the past with benefit.  She is s/p L5/S1 TAHIRA on 5/19/20 with about 2 weeks relief.     ROS:  She reports shortness of breath, wheezing, easy bruising, urinary frequency and back pain.  Balance of review of systems is negative.    Past Medical History:   Diagnosis Date    Anticoagulant long-term use     Plavix and ASA therapy    Arthritis     Asthma     seasonal, allergies    Blood in stool     Blood transfusion     Coronary artery disease     stent X1    Coronary artery disease involving native coronary artery of native heart without angina pectoris 6/1/2016    Diabetes mellitus     Dyslipidemia 6/1/2016    GERD (gastroesophageal reflux disease)     Hypertension     S/P CABG (coronary artery bypass graft) x 3 1991 6/1/2016    S/P CABG  x 3 1991 LIMA-LAD, VG-D (occluded), VG-RCA    S/P coronary artery stent placement 6/1/2016 7/2016 PDA- 2.25 x 24, synergy  8/15 lad/diag promus 2.5x16  8/2009 vg-rca- promus 3.5x8    Statin intolerance 11/30/2017    Failed prava/lipitor/crestor. Tolerates livalo    SVT  (supraventricular tachycardia) 4/24/2017 6/2017 EPS 1.  Normal baseline intervals. 2.  No evidence of an accessory pathway. 3.  Atrial tachycardia mapped to the posterior aspect near the IVC, status post radiofrequency ablation.       Past Surgical History:   Procedure Laterality Date    ACHILLES TENDON SURGERY Left     torn tendon    CARDIAC SURGERY      x 4--22 years ago    cataract surgery      CHOLECYSTECTOMY      cholescystectomy      COLONOSCOPY  7/20/2006  [facility]    One 1 to 2 mm polyp in the proximal descending colon.  HYPERPLASTIC POLYP.    Internal hemorrhoids.    CORONARY STENT PLACEMENT      2009 X 1; 2014 X 1    EPIDURAL STEROID INJECTION INTO LUMBAR SPINE N/A 5/19/2020    Procedure: Injection-steroid-epidural-lumbar L5/S1;  Surgeon: Mannie Montoya MD;  Location: [facility] OR;  Service: Pain Management;  Laterality: N/A;    HYSTERECTOMY         Social History     Socioeconomic History    Marital status:      Spouse name: Not on file    Number of children: Not on file    Years of education: Not on file    Highest education level: Not on file   Occupational History    Not on file   Social Needs    Financial resource strain: Not on file    Food insecurity     Worry: Not on file     Inability: Not on file    Transportation needs     Medical: Not on file     Non-medical: Not on file   Tobacco Use    Smoking status: Never Smoker    Smokeless tobacco: Never Used   Substance and Sexual Activity    Alcohol use: Yes     Comment: 3 glasses wine daily    Drug use: No    Sexual activity: Yes     Partners: Male     Birth control/protection: Surgical     Comment: hysterectomy    Lifestyle    Physical activity     Days per week: Not on file     Minutes per session: Not on file    Stress: Not on file   Relationships    Social connections     Talks on phone: Not on file     Gets together: Not on file     Attends Judaism service: Not on file     Active member of club or organization:  Not on file     Attends meetings of clubs or organizations: Not on file     Relationship status: Not on file   Other Topics Concern    Are you pregnant or think you may be? Not Asked    Breast-feeding Not Asked   Social History Narrative    Not on file         Medications/Allergies: See med card    Vitals:    06/15/20 1042   BP: (!) 117/56   Pulse: 63   Resp: 18   Temp: 98.6 °F (37 °C)   TempSrc: Temporal   SpO2: 98%   Weight: 94 kg (207 lb 5.5 oz)   PainSc:   4   PainLoc: Back     Body mass index is 34.5 kg/m².      Physical exam:  Gen: A and O x3, pleasant, well-groomed, significant central adiposity   Skin: No rashes or obvious lesions  HEENT: PERRLA, no obvious deformities on ears or in canals. Trachea midline.  CVS: Regular rate and rhythm, normal palpable pulses.  Resp:No increased work of breathing, symmetrical chest rise.  Abdomen: Soft, NT/ND.  Musculoskeletal: Able to heel walk, toe walk. No antalgic gait.     Neuro:  Lower extremities: 5/5 strength bilaterally  Reflexes: Patellar 2+, Achilles 2+ bilaterally.  Sensory:  Intact and symmetrical to light touch and pinprick in L2-S1 dermatomes bilaterally.    Lumbar spine:  Lumbar spine:  Range of motion was mildly reduced with forward flexion with no increased pain, moderately reduced with extension especially with oblique extension to either side, radiates into right lateral hip with extension.  Miah's test causes no increased pain on either side.    Supine straight leg raise is negative bilaterally.    Internal and external rotation of the hip causes no increased pain on either side.  Myofascial exam: No tenderness to palpation across lumbar paraspinous muscles.    Neuro:  Upper extremities: 5/5 strength bilaterally   Reflexes: Brachioradialis 1+, Bicep 0+, Tricep 0+.   Sensory: Intact and symmetrical to light touch and pinprick in C2-T1 dermatomes bilaterally.    Cervical Spine:  Cervical spine:  Range of motion is moderately decreased with lateral  rotation to either side and with extension with increased pain on each of these maneuvers, mildly decreased with forward flexion with no increased pain.  Spurling's maneuver causes neck pain to either side.  Myofascial exam:  There is some tenderness to palpation over the cervical paraspinous and trapezius muscles bilaterally.  No tenderness into the rhomboid muscles.    Imaging:  Lumbar spine MRI  T12-L1: Central posterior disc osteophyte complex.  No spinal canal or neural foraminal stenosis.  L1-L2: Minimal diffuse disc bulge.  Mild bilateral facet arthropathy.  There is no neuroforaminal stenosis.  There is no spinal canal stenosis.  L2-L3: Mild disc space narrowing.  Diffuse disc bulge with posterior osteophytic ridging and superimposed small central disc extrusion.  Mild bilateral facet arthropathy.  Mild bilateral neural foraminal stenosis.  There is no spinal canal stenosis.  L3-L4: Mild-to-moderate disc space narrowing.  Diffuse disc bulge with posterior osteophytic ridging and superimposed small central disc extrusion.  Mild bilateral facet arthropathy.  Ligamentum flavum infolding.  Mild bilateral neural foraminal stenosis, right greater than left.  Mild-to-moderate spinal canal stenosis.  L4-L5: Mild-to-moderate disc space narrowing.  Diffuse disc bulge with posterior osteophytic ridging and superimposed central disc extrusion through an annular fissure, which contributes to lateral recess stenosis bilaterally.  Moderate bilateral facet arthropathy.  Ligamentum flavum infolding.  Mild bilateral neural foraminal stenosis, right greater than left.  Mild spinal canal stenosis.  L5-S1: Mild-to-moderate disc space narrowing.  Diffuse disc bulge with superimposed right subarticular to extraforaminal disc extrusion, which contributes to severe right neural foraminal stenosis and narrowing of the right lateral recess with abutment of the descending right S1 nerve root.  Moderate right and mild left facet  arthropathy.  Ligamentum flavum infolding.  Severe right and moderate left neural foraminal stenosis.  There is no spinal canal stenosis.     Assessment:  The patient is a 74-year-old woman with history of diabetes, CAD status post CABG and stents, chronic anti-platelet therapy, presents in self-referral for back and neck pain.    1. Lumbar spondylosis  Ambulatory referral/consult to Physical/Occupational Therapy    Vital signs    Place 18-22 gauage peripheral IV     Verify informed consent    Notify physician     Notify physician     Notify physician (specify)    Diet NPO    Place sequential compression device    Case Request Operating Room: Block-nerve-medial branch-lumbar    Place in Outpatient    lactated ringers infusion   2. Screening examination for infectious disease  COVID-19 Routine Screening   3. DDD (degenerative disc disease), lumbar           Plan:  1.  I believe the residual pain after LESI is due to her facet arthropathy.  Lumbar spine MRI is c/w lower level facet arthropathy. I will schedule her for a diagnostic bilateral medial branch block at L3-4, L4-5 and L5-S1.    2.  I have placed an order for physical therapy, she would like to return to [facility].  3.  We will call to evaluate her response to the MBB and proceed accordingly.

## 2020-06-16 ENCOUNTER — PATIENT MESSAGE (OUTPATIENT)
Dept: DERMATOLOGY | Facility: CLINIC | Age: 75
End: 2020-06-16

## 2020-06-23 ENCOUNTER — LAB VISIT (OUTPATIENT)
Dept: FAMILY MEDICINE | Facility: CLINIC | Age: 75
End: 2020-06-23
Payer: MEDICARE

## 2020-06-23 DIAGNOSIS — Z11.9 SCREENING EXAMINATION FOR INFECTIOUS DISEASE: ICD-10-CM

## 2020-06-23 PROCEDURE — U0003 INFECTIOUS AGENT DETECTION BY NUCLEIC ACID (DNA OR RNA); SEVERE ACUTE RESPIRATORY SYNDROME CORONAVIRUS 2 (SARS-COV-2) (CORONAVIRUS DISEASE [COVID-19]), AMPLIFIED PROBE TECHNIQUE, MAKING USE OF HIGH THROUGHPUT TECHNOLOGIES AS DESCRIBED BY CMS-2020-01-R: HCPCS

## 2020-06-23 NOTE — TELEPHONE ENCOUNTER
Tried to reach pt. No answer, will try again later and I left a message on answering machine.    Contacting to inform pt of slots in schedule for appropriate times for procedures to be done.

## 2020-06-23 NOTE — TELEPHONE ENCOUNTER
----- Message from Enriqueta Huber sent at 6/23/2020 12:58 PM CDT -----  Regarding: r/s procedure  Type:  Sooner Apoointment Request    Caller is requesting a sooner appointment.  Caller declined first available appointment listed below.  Caller will not accept being placed on the waitlist and is requesting a message be sent to doctor.    Name of Caller:  pt  When is the first available appointment?    Symptoms:    Best Call Back Number:  [phone] (home)     Additional Information:  pt was told pt would me reschedule for July 29 @330 but she has not been reschedule in the system. Pls call to advise

## 2020-06-24 LAB — SARS-COV-2 RNA RESP QL NAA+PROBE: NOT DETECTED

## 2020-06-25 ENCOUNTER — HOSPITAL ENCOUNTER (OUTPATIENT)
Dept: RADIOLOGY | Facility: HOSPITAL | Age: 75
Discharge: HOME | End: 2020-06-25
Attending: ANESTHESIOLOGY | Admitting: ANESTHESIOLOGY
Payer: MEDICARE

## 2020-06-25 ENCOUNTER — HOSPITAL ENCOUNTER (OUTPATIENT)
Facility: HOSPITAL | Age: 75
Discharge: HOME | End: 2020-06-25
Attending: ANESTHESIOLOGY | Admitting: ANESTHESIOLOGY
Payer: MEDICARE

## 2020-06-25 VITALS
DIASTOLIC BLOOD PRESSURE: 74 MMHG | BODY MASS INDEX: 33.27 KG/M2 | SYSTOLIC BLOOD PRESSURE: 145 MMHG | TEMPERATURE: 98 F | HEIGHT: 66 IN | HEART RATE: 65 BPM | RESPIRATION RATE: 16 BRPM | WEIGHT: 207 LBS | OXYGEN SATURATION: 98 %

## 2020-06-25 DIAGNOSIS — M47.816 LUMBAR SPONDYLOSIS: ICD-10-CM

## 2020-06-25 DIAGNOSIS — M54.16 LUMBAR RADICULOPATHY: ICD-10-CM

## 2020-06-25 LAB — GLUCOSE SERPL-MCNC: 116 MG/DL (ref 70–110)

## 2020-06-25 PROCEDURE — 64493 INJ PARAVERT F JNT L/S 1 LEV: CPT | Mod: 50,PO | Performed by: ANESTHESIOLOGY

## 2020-06-25 PROCEDURE — 82962 GLUCOSE BLOOD TEST: CPT | Mod: PO | Performed by: ANESTHESIOLOGY

## 2020-06-25 PROCEDURE — 63600175 PHARM REV CODE 636 W HCPCS: Mod: PO | Performed by: ANESTHESIOLOGY

## 2020-06-25 PROCEDURE — 64493 INJ PARAVERT F JNT L/S 1 LEV: CPT | Mod: 50,,, | Performed by: ANESTHESIOLOGY

## 2020-06-25 PROCEDURE — 64495 INJ PARAVERT F JNT L/S 3 LEV: CPT | Mod: 50,,, | Performed by: ANESTHESIOLOGY

## 2020-06-25 PROCEDURE — 64494 INJ PARAVERT F JNT L/S 2 LEV: CPT | Mod: 50,,, | Performed by: ANESTHESIOLOGY

## 2020-06-25 PROCEDURE — 64494 PR INJ DX/THER AGNT PARAVERT FACET JOINT,IMG GUIDE,LUMBAR/SAC, 2ND LEVEL: ICD-10-PCS | Mod: 50,,, | Performed by: ANESTHESIOLOGY

## 2020-06-25 PROCEDURE — 25000003 PHARM REV CODE 250: Mod: PO | Performed by: ANESTHESIOLOGY

## 2020-06-25 PROCEDURE — 76000 FLUOROSCOPY <1 HR PHYS/QHP: CPT | Mod: TC,PO

## 2020-06-25 PROCEDURE — 64494 INJ PARAVERT F JNT L/S 2 LEV: CPT | Mod: 50,PO | Performed by: ANESTHESIOLOGY

## 2020-06-25 PROCEDURE — 64495 INJ PARAVERT F JNT L/S 3 LEV: CPT | Mod: 50,PO | Performed by: ANESTHESIOLOGY

## 2020-06-25 PROCEDURE — 25500020 PHARM REV CODE 255: Mod: PO | Performed by: ANESTHESIOLOGY

## 2020-06-25 PROCEDURE — 64493 PR INJ DX/THER AGNT PARAVERT FACET JOINT,IMG GUIDE,LUMBAR/SAC,1ST LVL: ICD-10-PCS | Mod: 50,,, | Performed by: ANESTHESIOLOGY

## 2020-06-25 PROCEDURE — 64495 PR INJ DX/THER AGNT PARAVERT FACET JOINT,IMG GUIDE,LUMBAR/SAC, ADD LEVEL: ICD-10-PCS | Mod: 50,,, | Performed by: ANESTHESIOLOGY

## 2020-06-25 RX ORDER — BUPIVACAINE HYDROCHLORIDE 2.5 MG/ML
INJECTION, SOLUTION EPIDURAL; INFILTRATION; INTRACAUDAL
Status: DISCONTINUED | OUTPATIENT
Start: 2020-06-25 | End: 2020-06-25 | Stop reason: HOSPADM

## 2020-06-25 RX ORDER — LIDOCAINE HYDROCHLORIDE 10 MG/ML
INJECTION, SOLUTION EPIDURAL; INFILTRATION; INTRACAUDAL; PERINEURAL
Status: DISCONTINUED | OUTPATIENT
Start: 2020-06-25 | End: 2020-06-25 | Stop reason: HOSPADM

## 2020-06-25 RX ORDER — MIDAZOLAM HYDROCHLORIDE 2 MG/2ML
INJECTION, SOLUTION INTRAMUSCULAR; INTRAVENOUS
Status: DISCONTINUED | OUTPATIENT
Start: 2020-06-25 | End: 2020-06-25 | Stop reason: HOSPADM

## 2020-06-25 RX ORDER — SODIUM CHLORIDE, SODIUM LACTATE, POTASSIUM CHLORIDE, CALCIUM CHLORIDE 600; 310; 30; 20 MG/100ML; MG/100ML; MG/100ML; MG/100ML
INJECTION, SOLUTION INTRAVENOUS CONTINUOUS
Status: DISCONTINUED | OUTPATIENT
Start: 2020-06-25 | End: 2020-06-25 | Stop reason: HOSPADM

## 2020-06-25 RX ADMIN — SODIUM CHLORIDE, SODIUM LACTATE, POTASSIUM CHLORIDE, AND CALCIUM CHLORIDE: .6; .31; .03; .02 INJECTION, SOLUTION INTRAVENOUS at 11:06

## 2020-06-25 NOTE — DISCHARGE SUMMARY
[facility]  Discharge Note  Short Stay    Admit Date: 6/25/2020    Discharge Date: 6/25/2020    Attending Physician: Mannie Montoya MD     Discharge Provider: Mannie Montoya    Diagnoses:  Active Hospital Problems    Diagnosis  POA    *Lumbar spondylosis [M47.816]  Yes      Resolved Hospital Problems   No resolved problems to display.       Discharged Condition: good    Final Diagnoses: Lumbar spondylosis [M47.816]    Disposition: Home or Self Care    Hospital Course: no complications, uneventful    Outcome of Hospitalization, Treatment, Procedure, or Surgery:  Patient was admitted for outpatient procedure. The patient underwent procedure without complications and are discharged home    Follow up/Patient Instructions:  Follow up as scheduled in Pain Management clinic in 3-4 weeks/Patient has received instructions and follow up date and time    Medications:  Continue previous medications    Discharge Procedure Orders   Call MD for:  temperature >100.4     Call MD for:  severe uncontrolled pain     Call MD for:  redness, tenderness, or signs of infection (pain, swelling, redness, odor or green/yellow discharge around incision site)     Call MD for:  severe persistent headache     No dressing needed         Discharge Procedure Orders (must include Diet, Follow-up, Activity):   Discharge Procedure Orders (must include Diet, Follow-up, Activity)   Call MD for:  temperature >100.4     Call MD for:  severe uncontrolled pain     Call MD for:  redness, tenderness, or signs of infection (pain, swelling, redness, odor or green/yellow discharge around incision site)     Call MD for:  severe persistent headache     No dressing needed

## 2020-06-25 NOTE — DISCHARGE INSTRUCTIONS
PAIN MANAGEMENT    Home care instructions   Apply ice pack to the injection site for 20 minute prior for the first 24 hours for soreness/discomfort at injection site   DO NOT USE HEAT FOR 24 HOURS   Keep site clean and dry for 24 hours, remove bandaid when desired   Do not drive until tomorrow  Take care when walking after a lumbar injection           BLOCKS  Resume regular activities today  Pain office will call in next 2 days      Resume Aspirin, Plavix, or Coumadin the day after the procedure unless other wise instructed  Resume home medication as prescribed today      CALL PHYSICIAN FOR:   Severe increase in your usual pain or appearance of new pain   Prolonged or increasing weakness or numbness in the legs or arms   Fever greater then 100 degrees F..   Drainage from the incision site, redness, active bleeding or increased swelling at the injection site   Headache that increases when your head is upright and decreases when you lie flat    FOR EMERGENCIES:   Go directly to Emergency Department for Shortness of breath, chest pain, or problems breathing

## 2020-06-25 NOTE — OP NOTE
PROCEDURE DATE: 6/25/2020    PROCEDURE:  Bilateral L2,3,4,5 medial branch nerve block     DIAGNOSIS:  Lumbar spondylosis    Post Op diagnosis: Same    PHYSICIAN: Mannie Montoya MD    MEDICATIONS INJECTED: 0.25% bupivicaine, 1ml at each level    LOCAL ANESTHETIC USED: Lidocaine 1%, 2ml at each level    SEDATION MEDICATIONS:2mg versed    ESTIMATED BLOOD LOSS:  none    COMPLICATIONS:  none    TECHNIQUE: A time out was taken to identify the patient, procedure and side of the procedure. The patient was placed in a prone position, then prepped and draped in the usual sterile fashion using ChloraPrep and sterile towels.  The levels were determined under fluoroscopic guidance and then marked.  Local anesthetic was given by raising a wheal at the skin over each site and then infiltrated approximately 2cm deeper.  A 25-gauge 3.5 inch needle was introduced to the anatomic location of the right and then left L2,3,4,5 medial branch nerves on the bilateral side.  Appropriate location and medication spread confirmed by injecting 0.5ml of Omnipaque. The above medication was then injected. The patient tolerated the procedure well.     The patient was monitored after the procedure. The patient will be contacted in the next few days to determine extent of relief.  Patient was given post procedure and discharge instructions to follow at home.  The patient was discharged in a stable condition.    Event Time In   In Facility 1114   In Pre-Procedure 1123   Physician Available    Anesthesia Available    Pre-Op: Bedside Procedure Start    Pre-Op: Bedside Procedure Stop    Pre-Procedure Complete 1152   Out of Pre-Procedure    Anesthesia Start    Anesthesia Start Data Collection    Setup Start    Setup Complete    In Room 1226   Prep Start    Procedure Prep Complete    Procedure Start 1238   Procedure Closing    Emergence    Procedure Finish 1248   Sedation Start 1226   Scope In    Extent Reached    Scope Out    Sedation End 1251   Out of  Room 1251   Cleanup Start    Cleanup Complete    Cosmetic Start    Cosmetic Stop    Pain Mgmt In Room    Pain Mgmt Out Room    In Recovery    Anesthesia Finish    Bedside Procedure Start    Bedside Procedure Stop    Recovery Care Complete    Out of Recovery    To Phase II    In Phase II    Pain Mgmt Recovery Start    Pain Mgmt Recovery Stop    Obs Rec Start    Obs Rec Stop    Phase II Care Complete    Out of Phase II    Procedural Care Complete    Discharge    Pain Follow Up Needed    Pain Follow Up Complete

## 2020-06-25 NOTE — INTERVAL H&P NOTE
The patient has been examined and the H&P has been reviewed:    I concur with the findings and no changes have occurred since H&P was written.    Anesthesia/Surgery risks, benefits and alternative options discussed and understood by patient/family.      ASA 2, mallampati 2      Active Hospital Problems    Diagnosis  POA    Lumbar spondylosis [M47.816]  Yes      Resolved Hospital Problems   No resolved problems to display.

## 2020-06-26 ENCOUNTER — TELEPHONE (OUTPATIENT)
Dept: PAIN MEDICINE | Facility: CLINIC | Age: 75
End: 2020-06-26

## 2020-07-08 ENCOUNTER — NURSE TRIAGE (OUTPATIENT)
Dept: ADMINISTRATIVE | Facility: CLINIC | Age: 75
End: 2020-07-08

## 2020-07-29 DIAGNOSIS — I10 ESSENTIAL HYPERTENSION: ICD-10-CM

## 2020-07-29 DIAGNOSIS — E78.5 DYSLIPIDEMIA: ICD-10-CM

## 2020-07-29 DIAGNOSIS — Z95.1 S/P CABG (CORONARY ARTERY BYPASS GRAFT): ICD-10-CM

## 2020-07-29 DIAGNOSIS — Z95.5 S/P CORONARY ARTERY STENT PLACEMENT: Chronic | ICD-10-CM

## 2020-07-29 DIAGNOSIS — I25.10 CORONARY ARTERY DISEASE INVOLVING NATIVE CORONARY ARTERY OF NATIVE HEART WITHOUT ANGINA PECTORIS: Chronic | ICD-10-CM

## 2020-07-29 RX ORDER — CARVEDILOL 25 MG/1
TABLET ORAL
Qty: 180 TABLET | Refills: 4 | Status: SHIPPED | OUTPATIENT
Start: 2020-07-29 | End: 2021-08-02

## 2020-07-29 NOTE — PROGRESS NOTES
"  PREOPERATIVE DIAGNOSIS: EIC  LOCATION:  Lower back    POSTOPERATIVE DIAGNOSIS: Same.    OPERATION PERFORMED: Excision with Intermediate repair.    SURGEON(S):   Patricia Juarez MD    ASSISTANT(S):  Lula Toro LPN     ANESTHESIA: Local.    INDICATIONS FOR PROCEDURE: This patient presents with the lesion noted above. History of cyst.  Excision with minimal margins with complex repair is indicated. Risks of pain, bleeding, scarring, infection, and wound dehiscence have been discussed and written informed consent obtained.    A timeout was completed, verifying correct patient, procedure, supplies, site, positioning, and implant(s) or special equipment.    PROCEDURE AND FINDINGS: The patient was placed on the operating room table. The lesion site was outlined and shown to the patient, who agreed that this was the correct site. The area was anesthetized with 1% lidocaine with epinephrine, washed with Exidine, rinsed with saline and draped with sterile towels. The cyst measuring *** cm in greatest diameter was excised with minimal margins to the underlying subcutaneous tissue. The wound edges were extensively undermined. Meticulous hemostasis was obtained. Subcutaneous, dermal and epidermal closure was performed using {DERM SUTURES:74685} sutures. The final wound length was *** cm. A pressure dressing was applied. A wound care and pain management handout with emergency phone numbers was discussed and given to the patient. The patient was discharged ambulatory with stable vital signs.    ESTIMATED BLOOD LOSS: Minimal    COMPLICATIONS: None.      {*** NOTE TO RESIDENT: Please fill out the following fields in [facility], then delete this segment of the progress note:  1) LOS: procedure-only is 85100.  For diagnostic code, generally you should use the diagnosis you suspect clinically, e.g., epidermoid cyst 706.2, nevus on trunk 216.5.  2) Follow-up: please write "sutures/staples out 2 weeks" or something similar.  3) " "Charge capture.     a. Excision: Typically "benign" in this clinic.  Measure the lesion (in the case of a cyst) or the lesion + margin (in the case of a lesion such as an atypical nevus for which we are excising with 3mm margins; for example, an 8mm scar with 3mm margins is 1.4cm).     b. Repair: Simple repair indicates you did not do a two-layer repair.  For example, a scalp cyst repaired with only staples.  In this case, you code only for the excision, NO repair.        "

## 2020-07-30 ENCOUNTER — TELEPHONE (OUTPATIENT)
Dept: PAIN MEDICINE | Facility: CLINIC | Age: 75
End: 2020-07-30

## 2020-07-30 ENCOUNTER — OFFICE VISIT (OUTPATIENT)
Dept: PAIN MEDICINE | Facility: CLINIC | Age: 75
End: 2020-07-30
Payer: MEDICARE

## 2020-07-30 VITALS
WEIGHT: 207 LBS | HEIGHT: 66 IN | BODY MASS INDEX: 33.27 KG/M2 | DIASTOLIC BLOOD PRESSURE: 66 MMHG | RESPIRATION RATE: 18 BRPM | HEART RATE: 63 BPM | SYSTOLIC BLOOD PRESSURE: 125 MMHG

## 2020-07-30 DIAGNOSIS — Z11.9 SCREENING EXAMINATION FOR INFECTIOUS DISEASE: ICD-10-CM

## 2020-07-30 DIAGNOSIS — M48.061 SPINAL STENOSIS OF LUMBAR REGION WITHOUT NEUROGENIC CLAUDICATION: ICD-10-CM

## 2020-07-30 DIAGNOSIS — M51.36 DDD (DEGENERATIVE DISC DISEASE), LUMBAR: ICD-10-CM

## 2020-07-30 DIAGNOSIS — M47.816 LUMBAR SPONDYLOSIS: ICD-10-CM

## 2020-07-30 DIAGNOSIS — M54.16 LUMBAR RADICULOPATHY: Primary | ICD-10-CM

## 2020-07-30 PROCEDURE — 3074F SYST BP LT 130 MM HG: CPT | Mod: CPTII,S$GLB,, | Performed by: ANESTHESIOLOGY

## 2020-07-30 PROCEDURE — 1159F MED LIST DOCD IN RCRD: CPT | Mod: S$GLB,,, | Performed by: ANESTHESIOLOGY

## 2020-07-30 PROCEDURE — 99999 PR PBB SHADOW E&M-EST. PATIENT-LVL V: ICD-10-PCS | Mod: PBBFAC,,, | Performed by: ANESTHESIOLOGY

## 2020-07-30 PROCEDURE — 3078F PR MOST RECENT DIASTOLIC BLOOD PRESSURE < 80 MM HG: ICD-10-PCS | Mod: CPTII,S$GLB,, | Performed by: ANESTHESIOLOGY

## 2020-07-30 PROCEDURE — 99999 PR PBB SHADOW E&M-EST. PATIENT-LVL V: CPT | Mod: PBBFAC,,, | Performed by: ANESTHESIOLOGY

## 2020-07-30 PROCEDURE — 3078F DIAST BP <80 MM HG: CPT | Mod: CPTII,S$GLB,, | Performed by: ANESTHESIOLOGY

## 2020-07-30 PROCEDURE — 3008F PR BODY MASS INDEX (BMI) DOCUMENTED: ICD-10-PCS | Mod: CPTII,S$GLB,, | Performed by: ANESTHESIOLOGY

## 2020-07-30 PROCEDURE — 1125F AMNT PAIN NOTED PAIN PRSNT: CPT | Mod: S$GLB,,, | Performed by: ANESTHESIOLOGY

## 2020-07-30 PROCEDURE — 1101F PR PT FALLS ASSESS DOC 0-1 FALLS W/OUT INJ PAST YR: ICD-10-PCS | Mod: CPTII,S$GLB,, | Performed by: ANESTHESIOLOGY

## 2020-07-30 PROCEDURE — 3008F BODY MASS INDEX DOCD: CPT | Mod: CPTII,S$GLB,, | Performed by: ANESTHESIOLOGY

## 2020-07-30 PROCEDURE — 99213 OFFICE O/P EST LOW 20 MIN: CPT | Mod: S$GLB,,, | Performed by: ANESTHESIOLOGY

## 2020-07-30 PROCEDURE — 99213 PR OFFICE/OUTPT VISIT, EST, LEVL III, 20-29 MIN: ICD-10-PCS | Mod: S$GLB,,, | Performed by: ANESTHESIOLOGY

## 2020-07-30 PROCEDURE — 1159F PR MEDICATION LIST DOCUMENTED IN MEDICAL RECORD: ICD-10-PCS | Mod: S$GLB,,, | Performed by: ANESTHESIOLOGY

## 2020-07-30 PROCEDURE — 1101F PT FALLS ASSESS-DOCD LE1/YR: CPT | Mod: CPTII,S$GLB,, | Performed by: ANESTHESIOLOGY

## 2020-07-30 PROCEDURE — 1125F PR PAIN SEVERITY QUANTIFIED, PAIN PRESENT: ICD-10-PCS | Mod: S$GLB,,, | Performed by: ANESTHESIOLOGY

## 2020-07-30 PROCEDURE — 3074F PR MOST RECENT SYSTOLIC BLOOD PRESSURE < 130 MM HG: ICD-10-PCS | Mod: CPTII,S$GLB,, | Performed by: ANESTHESIOLOGY

## 2020-07-30 RX ORDER — ALPRAZOLAM 0.5 MG/1
1 TABLET, ORALLY DISINTEGRATING ORAL ONCE AS NEEDED
Status: CANCELLED | OUTPATIENT
Start: 2020-08-10 | End: 2032-01-06

## 2020-07-30 NOTE — PROGRESS NOTES
This note was completed with dictation software and grammatical errors may exist.    CC:low back pain    HPI:  The patient is a 74-year-old woman with history of diabetes, CAD status post CABG and stents, chronic anti-platelet therapy, presents in follow up.  She is status post bilateral L2, 3, 4, 5 medial branch block on 06/25/2020 with no significant relief.  She continues to complain of severe low back pain that radiates into her hips and anterior thighs.  She states that her legs feel heavy when she walks.  The worst pain is trying to go up steps, she has a difficult time and sometimes has go sideways.  She states that when she 1st wakes up in the morning the pain is severe trying to get moving is difficult, some days once she is moving, she does better.  She does report that she had relief in the past as long as she was doing physical therapy on a regular basis but as soon as she discontinues the exercises her pain is worse.  She denies any chuck weakness, no bowel or bladder incontinence.      Pain intervention history:  She has had significant side effects with tramadol which made her dizzy.  She gets hives and reactions to other opioid medication.  She has done physical therapy numerous times, has done aquatic therapy in the past with benefit.  She is s/p L5/S1 TAHIRA on 5/19/20 with about 2 weeks relief.     ROS:  She reports shortness of breath, wheezing, easy bruising, urinary frequency and back pain.  Balance of review of systems is negative.    Past Medical History:   Diagnosis Date    Anticoagulant long-term use     Plavix and ASA therapy    Arthritis     Asthma     seasonal, allergies    Blood in stool     Blood transfusion     Coronary artery disease     stent X3    Coronary artery disease involving native coronary artery of native heart without angina pectoris 6/1/2016    Diabetes mellitus     Dyslipidemia 6/1/2016    GERD (gastroesophageal reflux disease)     Hypertension     S/P CABG  (coronary artery bypass graft) x 3 1991 6/1/2016    S/P CABG  x 3 1991 LIMA-LAD, VG-D (occluded), VG-RCA    S/P coronary artery stent placement 6/1/2016 7/2016 PDA- 2.25 x 24, synergy  8/15 lad/diag promus 2.5x16  8/2009 vg-rca- promus 3.5x8    Statin intolerance 11/30/2017    Failed prava/lipitor/crestor. Tolerates livalo    SVT (supraventricular tachycardia) 4/24/2017 6/2017 EPS 1.  Normal baseline intervals. 2.  No evidence of an accessory pathway. 3.  Atrial tachycardia mapped to the posterior aspect near the IVC, status post radiofrequency ablation.       Past Surgical History:   Procedure Laterality Date    ACHILLES TENDON SURGERY Left     torn tendon    CARDIAC SURGERY      x 4--22 years ago    cataract surgery      CHOLECYSTECTOMY      cholescystectomy      COLONOSCOPY  7/20/2006  [facility]    One 1 to 2 mm polyp in the proximal descending colon.  HYPERPLASTIC POLYP.    Internal hemorrhoids.    CORONARY STENT PLACEMENT      2009 X 1; 2014 X 1    EPIDURAL STEROID INJECTION INTO LUMBAR SPINE N/A 5/19/2020    Procedure: Injection-steroid-epidural-lumbar L5/S1;  Surgeon: Mannie Montoya MD;  Location: [facility] OR;  Service: Pain Management;  Laterality: N/A;    HYSTERECTOMY      INJECTION OF ANESTHETIC AGENT AROUND MEDIAL BRANCH NERVES INNERVATING LUMBAR FACET JOINT Bilateral 6/25/2020    Procedure: Block-nerve-medial branch-lumbar L3/4, L4/5, L5/S1;  Surgeon: Mannie Montoya MD;  Location: [facility] OR;  Service: Pain Management;  Laterality: Bilateral;       Social History     Socioeconomic History    Marital status:      Spouse name: Not on file    Number of children: Not on file    Years of education: Not on file    Highest education level: Not on file   Occupational History    Not on file   Social Needs    Financial resource strain: Not on file    Food insecurity     Worry: Not on file     Inability: Not on file    Transportation needs     Medical: Not on file     Non-medical:  "Not on file   Tobacco Use    Smoking status: Never Smoker    Smokeless tobacco: Never Used   Substance and Sexual Activity    Alcohol use: Yes     Comment: 3 glasses wine daily    Drug use: No    Sexual activity: Yes     Partners: Male     Birth control/protection: Surgical     Comment: hysterectomy    Lifestyle    Physical activity     Days per week: Not on file     Minutes per session: Not on file    Stress: Not on file   Relationships    Social connections     Talks on phone: Not on file     Gets together: Not on file     Attends Baptism service: Not on file     Active member of club or organization: Not on file     Attends meetings of clubs or organizations: Not on file     Relationship status: Not on file   Other Topics Concern    Are you pregnant or think you may be? Not Asked    Breast-feeding Not Asked   Social History Narrative    Not on file         Medications/Allergies: See med card    Vitals:    07/30/20 1054   BP: 125/66   Pulse: 63   Resp: 18   Weight: 93.9 kg (207 lb)   Height: 5' 6" (1.676 m)   PainSc:   4   PainLoc: Back     Body mass index is 33.41 kg/m².      Physical exam:  Gen: A and O x3, pleasant, well-groomed, significant central adiposity   Skin: No rashes or obvious lesions  HEENT: PERRLA, no obvious deformities on ears or in canals. Trachea midline.  CVS: Regular rate and rhythm, normal palpable pulses.  Resp:No increased work of breathing, symmetrical chest rise.  Abdomen: Soft, NT/ND.  Musculoskeletal: Able to heel walk, toe walk. No antalgic gait.     Neuro:  Lower extremities: 5/5 strength bilaterally  Reflexes: Patellar 2+, Achilles 2+ bilaterally.  Sensory:  Intact and symmetrical to light touch and pinprick in L2-S1 dermatomes bilaterally.    Lumbar spine:  Lumbar spine:  Range of motion was mildly reduced with forward flexion with no increased pain, moderately reduced with extension especially with oblique extension to either side, radiates into right lateral hip with " extension.  Miah's test causes no increased pain on either side.    Supine straight leg raise is negative bilaterally.    Internal and external rotation of the hip causes no increased pain on either side.  Myofascial exam: No tenderness to palpation across lumbar paraspinous muscles.    Neuro:  Upper extremities: 5/5 strength bilaterally   Reflexes: Brachioradialis 1+, Bicep 0+, Tricep 0+.   Sensory: Intact and symmetrical to light touch and pinprick in C2-T1 dermatomes bilaterally.    Cervical Spine:  Cervical spine:  Range of motion is moderately decreased with lateral rotation to either side and with extension with increased pain on each of these maneuvers, mildly decreased with forward flexion with no increased pain.  Spurling's maneuver causes neck pain to either side.  Myofascial exam:  There is some tenderness to palpation over the cervical paraspinous and trapezius muscles bilaterally.  No tenderness into the rhomboid muscles.    Imaging:  Lumbar spine MRI  T12-L1: Central posterior disc osteophyte complex.  No spinal canal or neural foraminal stenosis.  L1-L2: Minimal diffuse disc bulge.  Mild bilateral facet arthropathy.  There is no neuroforaminal stenosis.  There is no spinal canal stenosis.  L2-L3: Mild disc space narrowing.  Diffuse disc bulge with posterior osteophytic ridging and superimposed small central disc extrusion.  Mild bilateral facet arthropathy.  Mild bilateral neural foraminal stenosis.  There is no spinal canal stenosis.  L3-L4: Mild-to-moderate disc space narrowing.  Diffuse disc bulge with posterior osteophytic ridging and superimposed small central disc extrusion.  Mild bilateral facet arthropathy.  Ligamentum flavum infolding.  Mild bilateral neural foraminal stenosis, right greater than left.  Mild-to-moderate spinal canal stenosis.  L4-L5: Mild-to-moderate disc space narrowing.  Diffuse disc bulge with posterior osteophytic ridging and superimposed central disc extrusion through  an annular fissure, which contributes to lateral recess stenosis bilaterally.  Moderate bilateral facet arthropathy.  Ligamentum flavum infolding.  Mild bilateral neural foraminal stenosis, right greater than left.  Mild spinal canal stenosis.  L5-S1: Mild-to-moderate disc space narrowing.  Diffuse disc bulge with superimposed right subarticular to extraforaminal disc extrusion, which contributes to severe right neural foraminal stenosis and narrowing of the right lateral recess with abutment of the descending right S1 nerve root.  Moderate right and mild left facet arthropathy.  Ligamentum flavum infolding.  Severe right and moderate left neural foraminal stenosis.  There is no spinal canal stenosis.     Assessment:  The patient is a 75-year-old woman with history of diabetes, CAD status post CABG and stents, chronic anti-platelet therapy, presents in self-referral for back and neck pain.    1. Lumbar radiculopathy  Vital signs    Verify informed consent    Notify physician     Notify physician     Notify physician (specify)    Diet NPO    Case Request Operating Room: Injection-steroid-epidural-lumbar, L3/4    Place in Outpatient    alprazolam ODT dissolvable tablet 1 mg   2. Screening examination for infectious disease  COVID-19 Routine Screening   3. Lumbar spondylosis     4. DDD (degenerative disc disease), lumbar     5. Spinal stenosis of lumbar region without neurogenic claudication           Plan:  1.  We again reviewed her symptoms, reviewed her lumbar spine MRI.  While she did not have long-lasting relief with the 1st epidural steroid injection at L5/S1, she did have fairly good benefit while it did help for 2 weeks.  We discussed that she is having pain into her hips and heaviness in her legs, discussed that this could be due to canal stenosis.  We discussed trying an injection at L3/4 where she has moderate canal stenosis and see if this provides benefit.  If she is not having significant relief, she  could see Neurosurgery but her symptoms did not necessarily suggest that she would be a great surgical candidate.  2.  I am also going to have her get set up with physical therapy at [facility] in [place].  We discussed the importance of weight loss and also core strengthening which I think would help a great deal for her.

## 2020-07-30 NOTE — TELEPHONE ENCOUNTER
Patient is scheduled for an epidural steroid injection with Dr. Montoya on 8/10. Patient will need to hold aspirin and plavix 7 days before procedure. Please advise if this is okay.

## 2020-07-30 NOTE — H&P (VIEW-ONLY)
This note was completed with dictation software and grammatical errors may exist.    CC:low back pain    HPI:  The patient is a 74-year-old woman with history of diabetes, CAD status post CABG and stents, chronic anti-platelet therapy, presents in follow up.  She is status post bilateral L2, 3, 4, 5 medial branch block on 06/25/2020 with no significant relief.  She continues to complain of severe low back pain that radiates into her hips and anterior thighs.  She states that her legs feel heavy when she walks.  The worst pain is trying to go up steps, she has a difficult time and sometimes has go sideways.  She states that when she 1st wakes up in the morning the pain is severe trying to get moving is difficult, some days once she is moving, she does better.  She does report that she had relief in the past as long as she was doing physical therapy on a regular basis but as soon as she discontinues the exercises her pain is worse.  She denies any chuck weakness, no bowel or bladder incontinence.      Pain intervention history:  She has had significant side effects with tramadol which made her dizzy.  She gets hives and reactions to other opioid medication.  She has done physical therapy numerous times, has done aquatic therapy in the past with benefit.  She is s/p L5/S1 TAHIRA on 5/19/20 with about 2 weeks relief.     ROS:  She reports shortness of breath, wheezing, easy bruising, urinary frequency and back pain.  Balance of review of systems is negative.    Past Medical History:   Diagnosis Date    Anticoagulant long-term use     Plavix and ASA therapy    Arthritis     Asthma     seasonal, allergies    Blood in stool     Blood transfusion     Coronary artery disease     stent X3    Coronary artery disease involving native coronary artery of native heart without angina pectoris 6/1/2016    Diabetes mellitus     Dyslipidemia 6/1/2016    GERD (gastroesophageal reflux disease)     Hypertension     S/P CABG  (coronary artery bypass graft) x 3 1991 6/1/2016    S/P CABG  x 3 1991 LIMA-LAD, VG-D (occluded), VG-RCA    S/P coronary artery stent placement 6/1/2016 7/2016 PDA- 2.25 x 24, synergy  8/15 lad/diag promus 2.5x16  8/2009 vg-rca- promus 3.5x8    Statin intolerance 11/30/2017    Failed prava/lipitor/crestor. Tolerates livalo    SVT (supraventricular tachycardia) 4/24/2017 6/2017 EPS 1.  Normal baseline intervals. 2.  No evidence of an accessory pathway. 3.  Atrial tachycardia mapped to the posterior aspect near the IVC, status post radiofrequency ablation.       Past Surgical History:   Procedure Laterality Date    ACHILLES TENDON SURGERY Left     torn tendon    CARDIAC SURGERY      x 4--22 years ago    cataract surgery      CHOLECYSTECTOMY      cholescystectomy      COLONOSCOPY  7/20/2006  [facility]    One 1 to 2 mm polyp in the proximal descending colon.  HYPERPLASTIC POLYP.    Internal hemorrhoids.    CORONARY STENT PLACEMENT      2009 X 1; 2014 X 1    EPIDURAL STEROID INJECTION INTO LUMBAR SPINE N/A 5/19/2020    Procedure: Injection-steroid-epidural-lumbar L5/S1;  Surgeon: Mannie Montoya MD;  Location: [facility] OR;  Service: Pain Management;  Laterality: N/A;    HYSTERECTOMY      INJECTION OF ANESTHETIC AGENT AROUND MEDIAL BRANCH NERVES INNERVATING LUMBAR FACET JOINT Bilateral 6/25/2020    Procedure: Block-nerve-medial branch-lumbar L3/4, L4/5, L5/S1;  Surgeon: Mannie Montoya MD;  Location: [facility] OR;  Service: Pain Management;  Laterality: Bilateral;       Social History     Socioeconomic History    Marital status:      Spouse name: Not on file    Number of children: Not on file    Years of education: Not on file    Highest education level: Not on file   Occupational History    Not on file   Social Needs    Financial resource strain: Not on file    Food insecurity     Worry: Not on file     Inability: Not on file    Transportation needs     Medical: Not on file     Non-medical:  "Not on file   Tobacco Use    Smoking status: Never Smoker    Smokeless tobacco: Never Used   Substance and Sexual Activity    Alcohol use: Yes     Comment: 3 glasses wine daily    Drug use: No    Sexual activity: Yes     Partners: Male     Birth control/protection: Surgical     Comment: hysterectomy    Lifestyle    Physical activity     Days per week: Not on file     Minutes per session: Not on file    Stress: Not on file   Relationships    Social connections     Talks on phone: Not on file     Gets together: Not on file     Attends [employer] service: Not on file     Active member of club or organization: Not on file     Attends meetings of clubs or organizations: Not on file     Relationship status: Not on file   Other Topics Concern    Are you pregnant or think you may be? Not Asked    Breast-feeding Not Asked   Social History Narrative    Not on file         Medications/Allergies: See med card    Vitals:    07/30/20 1054   BP: 125/66   Pulse: 63   Resp: 18   Weight: 93.9 kg (207 lb)   Height: 5' 6" (1.676 m)   PainSc:   4   PainLoc: Back     Body mass index is 33.41 kg/m².      Physical exam:  Gen: A and O x3, pleasant, well-groomed, significant central adiposity   Skin: No rashes or obvious lesions  HEENT: PERRLA, no obvious deformities on ears or in canals. Trachea midline.  CVS: Regular rate and rhythm, normal palpable pulses.  Resp:No increased work of breathing, symmetrical chest rise.  Abdomen: Soft, NT/ND.  Musculoskeletal: Able to heel walk, toe walk. No antalgic gait.     Neuro:  Lower extremities: 5/5 strength bilaterally  Reflexes: Patellar 2+, Achilles 2+ bilaterally.  Sensory:  Intact and symmetrical to light touch and pinprick in L2-S1 dermatomes bilaterally.    Lumbar spine:  Lumbar spine:  Range of motion was mildly reduced with forward flexion with no increased pain, moderately reduced with extension especially with oblique extension to either side, radiates into right lateral hip with " extension.  Miah's test causes no increased pain on either side.    Supine straight leg raise is negative bilaterally.    Internal and external rotation of the hip causes no increased pain on either side.  Myofascial exam: No tenderness to palpation across lumbar paraspinous muscles.    Neuro:  Upper extremities: 5/5 strength bilaterally   Reflexes: Brachioradialis 1+, Bicep 0+, Tricep 0+.   Sensory: Intact and symmetrical to light touch and pinprick in C2-T1 dermatomes bilaterally.    Cervical Spine:  Cervical spine:  Range of motion is moderately decreased with lateral rotation to either side and with extension with increased pain on each of these maneuvers, mildly decreased with forward flexion with no increased pain.  Spurling's maneuver causes neck pain to either side.  Myofascial exam:  There is some tenderness to palpation over the cervical paraspinous and trapezius muscles bilaterally.  No tenderness into the rhomboid muscles.    Imaging:  Lumbar spine MRI  T12-L1: Central posterior disc osteophyte complex.  No spinal canal or neural foraminal stenosis.  L1-L2: Minimal diffuse disc bulge.  Mild bilateral facet arthropathy.  There is no neuroforaminal stenosis.  There is no spinal canal stenosis.  L2-L3: Mild disc space narrowing.  Diffuse disc bulge with posterior osteophytic ridging and superimposed small central disc extrusion.  Mild bilateral facet arthropathy.  Mild bilateral neural foraminal stenosis.  There is no spinal canal stenosis.  L3-L4: Mild-to-moderate disc space narrowing.  Diffuse disc bulge with posterior osteophytic ridging and superimposed small central disc extrusion.  Mild bilateral facet arthropathy.  Ligamentum flavum infolding.  Mild bilateral neural foraminal stenosis, right greater than left.  Mild-to-moderate spinal canal stenosis.  L4-L5: Mild-to-moderate disc space narrowing.  Diffuse disc bulge with posterior osteophytic ridging and superimposed central disc extrusion through  an annular fissure, which contributes to lateral recess stenosis bilaterally.  Moderate bilateral facet arthropathy.  Ligamentum flavum infolding.  Mild bilateral neural foraminal stenosis, right greater than left.  Mild spinal canal stenosis.  L5-S1: Mild-to-moderate disc space narrowing.  Diffuse disc bulge with superimposed right subarticular to extraforaminal disc extrusion, which contributes to severe right neural foraminal stenosis and narrowing of the right lateral recess with abutment of the descending right S1 nerve root.  Moderate right and mild left facet arthropathy.  Ligamentum flavum infolding.  Severe right and moderate left neural foraminal stenosis.  There is no spinal canal stenosis.     Assessment:  The patient is a 75-year-old woman with history of diabetes, CAD status post CABG and stents, chronic anti-platelet therapy, presents in self-referral for back and neck pain.    1. Lumbar radiculopathy  Vital signs    Verify informed consent    Notify physician     Notify physician     Notify physician (specify)    Diet NPO    Case Request Operating Room: Injection-steroid-epidural-lumbar, L3/4    Place in Outpatient    alprazolam ODT dissolvable tablet 1 mg   2. Screening examination for infectious disease  COVID-19 Routine Screening   3. Lumbar spondylosis     4. DDD (degenerative disc disease), lumbar     5. Spinal stenosis of lumbar region without neurogenic claudication           Plan:  1.  We again reviewed her symptoms, reviewed her lumbar spine MRI.  While she did not have long-lasting relief with the 1st epidural steroid injection at L5/S1, she did have fairly good benefit while it did help for 2 weeks.  We discussed that she is having pain into her hips and heaviness in her legs, discussed that this could be due to canal stenosis.  We discussed trying an injection at L3/4 where she has moderate canal stenosis and see if this provides benefit.  If she is not having significant relief, she  could see Neurosurgery but her symptoms did not necessarily suggest that she would be a great surgical candidate.  2.  I am also going to have her get set up with physical therapy at [facility] in [place].  We discussed the importance of weight loss and also core strengthening which I think would help a great deal for her.

## 2020-07-31 ENCOUNTER — PROCEDURE VISIT (OUTPATIENT)
Dept: DERMATOLOGY | Facility: CLINIC | Age: 75
End: 2020-07-31
Payer: MEDICARE

## 2020-07-31 DIAGNOSIS — D22.9 MULTIPLE BENIGN NEVI: ICD-10-CM

## 2020-07-31 DIAGNOSIS — L82.1 SEBORRHEIC KERATOSES: ICD-10-CM

## 2020-07-31 DIAGNOSIS — L98.9 SKIN LESION: ICD-10-CM

## 2020-07-31 DIAGNOSIS — L72.0 EIC (EPIDERMAL INCLUSION CYST): Primary | ICD-10-CM

## 2020-07-31 PROCEDURE — 99213 OFFICE O/P EST LOW 20 MIN: CPT | Mod: S$GLB,,, | Performed by: DERMATOLOGY

## 2020-07-31 PROCEDURE — 99213 PR OFFICE/OUTPT VISIT, EST, LEVL III, 20-29 MIN: ICD-10-PCS | Mod: S$GLB,,, | Performed by: DERMATOLOGY

## 2020-07-31 NOTE — PROGRESS NOTES
Subjective:       Patient ID:  Mayra Pinedo is a 75 y.o. female who presents for   Chief Complaint   Patient presents with    Procedure     Patient present for lesion.     C/o lesions to back x years. The patient denies any change, including change in color, increase in size, or spontaneous bleeding, associated with these lesions.    She also complains of a lesion on her right dorsal hand s/p trauma that is slow to heal.        Past Medical History:  No date: Anticoagulant long-term use      Comment:  Plavix and ASA therapy  No date: Arthritis  No date: Asthma      Comment:  seasonal, allergies  No date: Blood in stool  No date: Blood transfusion  No date: Coronary artery disease      Comment:  stent X3  6/1/2016: Coronary artery disease involving native coronary artery of   native heart without angina pectoris  No date: Diabetes mellitus  6/1/2016: Dyslipidemia  No date: GERD (gastroesophageal reflux disease)  No date: Hypertension  6/1/2016: S/P CABG (coronary artery bypass graft) x 3 1991      Comment:  S/P CABG  x 3 1991 LIMA-LAD, VG-D (occluded), VG-RCA  6/1/2016: S/P coronary artery stent placement      Comment:  7/2016 PDA- 2.25 x 24, synergy  8/15 lad/diag promus                2.5x16  8/2009 vg-rca- promus 3.5x8  11/30/2017: Statin intolerance      Comment:  Failed prava/lipitor/crestor. Tolerates livalo  4/24/2017: SVT (supraventricular tachycardia)      Comment:  6/2017 EPS 1.  Normal baseline intervals. 2.  No                evidence of an accessory pathway. 3.  Atrial tachycardia                mapped to the posterior aspect near the IVC, status post                radiofrequency ablation.        Review of Systems   Constitutional: Negative for fever and chills.   HENT: Negative for sore throat.    Respiratory: Negative for cough.    Gastrointestinal: Negative for nausea and vomiting.   Skin: Positive for dry skin and activity-related sunscreen use. Negative for itching, rash, daily sunscreen  use and recent sunburn.   Hematologic/Lymphatic: Does not bruise/bleed easily.        Objective:    Physical Exam   Constitutional: She appears well-developed and well-nourished. No distress.   Neurological: She is alert and oriented to person, place, and time. She is not disoriented.   Psychiatric: She has a normal mood and affect.   Skin:   Areas Examined (abnormalities noted in diagram):   Head / Face Inspection Performed  Neck Inspection Performed  Back Inspection Performed  RUE Inspected  LUE Inspection Performed  RLE Inspected  LLE Inspection Performed                   Diagram Legend     Erythematous scaling macule/papule c/w actinic keratosis       Vascular papule c/w angioma      Pigmented verrucoid papule/plaque c/w seborrheic keratosis      Yellow umbilicated papule c/w sebaceous hyperplasia      Irregularly shaped tan macule c/w lentigo     1-2 mm smooth white papules consistent with Milia      Movable subcutaneous cyst with punctum c/w epidermal inclusion cyst      Subcutaneous movable cyst c/w pilar cyst      Firm pink to brown papule c/w dermatofibroma      Pedunculated fleshy papule(s) c/w skin tag(s)      Evenly pigmented macule c/w junctional nevus     Mildly variegated pigmented, slightly irregular-bordered macule c/w mildly atypical nevus      Flesh colored to evenly pigmented papule c/w intradermal nevus       Pink pearly papule/plaque c/w basal cell carcinoma      Erythematous hyperkeratotic cursted plaque c/w SCC      Surgical scar with no sign of skin cancer recurrence      Open and closed comedones      Inflammatory papules and pustules      Verrucoid papule consistent consistent with wart     Erythematous eczematous patches and plaques     Dystrophic onycholytic nail with subungual debris c/w onychomycosis     Umbilicated papule    Erythematous-base heme-crusted tan verrucoid plaque consistent with inflamed seborrheic keratosis     Erythematous Silvery Scaling Plaque c/w Psoriasis     See  annotation      Assessment / Plan:        EIC (epidermal inclusion cyst)  - The benign nature of these lesions was discussed with the patient and that no treatment is indicated today.   - Discussed that when not inflamed, definite treatment includes surgical excision.  - pt deferred sx today      Seborrheic keratoses  These are benign inherited growths without a malignant potential. Reassurance given to patient. No treatment is necessary.       Multiple benign nevi  Discussed ABCDE's of nevi.  Monitor for new mole or moles that are becoming bigger, darker, irritated, or developing irregular borders. Brochure provided.      Skin lesion on right dorsal hand - favor healing erosion but will monitor. RTC in 2 months for re-evaluation    Skin Lesion on right lower leg - non specific but no concerning signs for NMSC today. Will re-evaluate in 2 months            Follow up in about 2 months (around 9/30/2020).

## 2020-08-05 ENCOUNTER — TELEPHONE (OUTPATIENT)
Dept: OBSTETRICS AND GYNECOLOGY | Facility: CLINIC | Age: 75
End: 2020-08-05

## 2020-08-05 DIAGNOSIS — Z12.31 OTHER SCREENING MAMMOGRAM: Primary | ICD-10-CM

## 2020-08-05 NOTE — TELEPHONE ENCOUNTER
----- Message from Clara Cabrera MA sent at 8/5/2020  2:44 PM CDT -----  Patient is coming in for her Well Woman exam on August 18 at 1:40.  She is asking for her mammogram to be scheduled the same day and around the same time.  Please call to discuss.  [phone]

## 2020-08-05 NOTE — TELEPHONE ENCOUNTER
Notified that we will do mammo to follow her wwe she verbalized understanding no further questions.

## 2020-08-06 ENCOUNTER — CLINICAL SUPPORT (OUTPATIENT)
Dept: CARDIOLOGY | Facility: CLINIC | Age: 75
End: 2020-08-06
Attending: INTERNAL MEDICINE
Payer: MEDICARE

## 2020-08-06 ENCOUNTER — LAB VISIT (OUTPATIENT)
Dept: LAB | Facility: HOSPITAL | Age: 75
End: 2020-08-06
Attending: INTERNAL MEDICINE
Payer: MEDICARE

## 2020-08-06 VITALS — HEIGHT: 66 IN | WEIGHT: 207 LBS | BODY MASS INDEX: 33.27 KG/M2

## 2020-08-06 DIAGNOSIS — Z78.9 STATIN INTOLERANCE: ICD-10-CM

## 2020-08-06 DIAGNOSIS — Z95.5 S/P CORONARY ARTERY STENT PLACEMENT: Chronic | ICD-10-CM

## 2020-08-06 DIAGNOSIS — E78.5 DYSLIPIDEMIA: ICD-10-CM

## 2020-08-06 DIAGNOSIS — I25.10 CORONARY ARTERY DISEASE INVOLVING NATIVE CORONARY ARTERY OF NATIVE HEART WITHOUT ANGINA PECTORIS: Chronic | ICD-10-CM

## 2020-08-06 DIAGNOSIS — Z95.5 S/P CORONARY ARTERY STENT PLACEMENT: ICD-10-CM

## 2020-08-06 DIAGNOSIS — Z95.1 S/P CABG (CORONARY ARTERY BYPASS GRAFT): ICD-10-CM

## 2020-08-06 DIAGNOSIS — I47.10 SVT (SUPRAVENTRICULAR TACHYCARDIA): ICD-10-CM

## 2020-08-06 DIAGNOSIS — I10 ESSENTIAL HYPERTENSION: ICD-10-CM

## 2020-08-06 DIAGNOSIS — I25.10 CORONARY ARTERY DISEASE INVOLVING NATIVE CORONARY ARTERY OF NATIVE HEART WITHOUT ANGINA PECTORIS: ICD-10-CM

## 2020-08-06 LAB
ALBUMIN SERPL BCP-MCNC: 3.8 G/DL (ref 3.5–5.2)
ALP SERPL-CCNC: 51 U/L (ref 55–135)
ALT SERPL W/O P-5'-P-CCNC: 19 U/L (ref 10–44)
ANION GAP SERPL CALC-SCNC: 7 MMOL/L (ref 8–16)
AST SERPL-CCNC: 22 U/L (ref 10–40)
BASOPHILS # BLD AUTO: 0.06 K/UL (ref 0–0.2)
BASOPHILS NFR BLD: 1.2 % (ref 0–1.9)
BILIRUB SERPL-MCNC: 0.4 MG/DL (ref 0.1–1)
BUN SERPL-MCNC: 8 MG/DL (ref 8–23)
CALCIUM SERPL-MCNC: 9.4 MG/DL (ref 8.7–10.5)
CHLORIDE SERPL-SCNC: 107 MMOL/L (ref 95–110)
CHOLEST SERPL-MCNC: 148 MG/DL (ref 120–199)
CHOLEST/HDLC SERPL: 5.3 {RATIO} (ref 2–5)
CO2 SERPL-SCNC: 26 MMOL/L (ref 23–29)
CREAT SERPL-MCNC: 0.9 MG/DL (ref 0.5–1.4)
DIFFERENTIAL METHOD: ABNORMAL
EOSINOPHIL # BLD AUTO: 0.2 K/UL (ref 0–0.5)
EOSINOPHIL NFR BLD: 3.4 % (ref 0–8)
ERYTHROCYTE [DISTWIDTH] IN BLOOD BY AUTOMATED COUNT: 13.3 % (ref 11.5–14.5)
EST. GFR  (AFRICAN AMERICAN): >60 ML/MIN/1.73 M^2
EST. GFR  (NON AFRICAN AMERICAN): >60 ML/MIN/1.73 M^2
GLUCOSE SERPL-MCNC: 134 MG/DL (ref 70–110)
HCT VFR BLD AUTO: 39.1 % (ref 37–48.5)
HDLC SERPL-MCNC: 28 MG/DL (ref 40–75)
HDLC SERPL: 18.9 % (ref 20–50)
HGB BLD-MCNC: 12 G/DL (ref 12–16)
IMM GRANULOCYTES # BLD AUTO: 0.02 K/UL (ref 0–0.04)
IMM GRANULOCYTES NFR BLD AUTO: 0.4 % (ref 0–0.5)
LDLC SERPL CALC-MCNC: 44.6 MG/DL (ref 63–159)
LYMPHOCYTES # BLD AUTO: 1.3 K/UL (ref 1–4.8)
LYMPHOCYTES NFR BLD: 25.9 % (ref 18–48)
MCH RBC QN AUTO: 30.5 PG (ref 27–31)
MCHC RBC AUTO-ENTMCNC: 30.7 G/DL (ref 32–36)
MCV RBC AUTO: 100 FL (ref 82–98)
MONOCYTES # BLD AUTO: 0.5 K/UL (ref 0.3–1)
MONOCYTES NFR BLD: 9.8 % (ref 4–15)
NEUTROPHILS # BLD AUTO: 3 K/UL (ref 1.8–7.7)
NEUTROPHILS NFR BLD: 59.3 % (ref 38–73)
NONHDLC SERPL-MCNC: 120 MG/DL
NRBC BLD-RTO: 0 /100 WBC
PLATELET # BLD AUTO: 194 K/UL (ref 150–350)
PMV BLD AUTO: 11.5 FL (ref 9.2–12.9)
POTASSIUM SERPL-SCNC: 4.3 MMOL/L (ref 3.5–5.1)
PROT SERPL-MCNC: 7.3 G/DL (ref 6–8.4)
RBC # BLD AUTO: 3.93 M/UL (ref 4–5.4)
SODIUM SERPL-SCNC: 140 MMOL/L (ref 136–145)
TRIGL SERPL-MCNC: 377 MG/DL (ref 30–150)
WBC # BLD AUTO: 4.98 K/UL (ref 3.9–12.7)

## 2020-08-06 PROCEDURE — 99999 PR PBB SHADOW E&M-EST. PATIENT-LVL I: CPT | Mod: PBBFAC,,,

## 2020-08-06 PROCEDURE — 85025 COMPLETE CBC W/AUTO DIFF WBC: CPT

## 2020-08-06 PROCEDURE — 80061 LIPID PANEL: CPT

## 2020-08-06 PROCEDURE — 93306 TRANSTHORACIC ECHO (TTE) COMPLETE (CUPID ONLY): ICD-10-PCS | Mod: S$GLB,,, | Performed by: INTERNAL MEDICINE

## 2020-08-06 PROCEDURE — 93306 TTE W/DOPPLER COMPLETE: CPT | Mod: S$GLB,,, | Performed by: INTERNAL MEDICINE

## 2020-08-06 PROCEDURE — 99999 PR PBB SHADOW E&M-EST. PATIENT-LVL I: ICD-10-PCS | Mod: PBBFAC,,,

## 2020-08-06 PROCEDURE — 36415 COLL VENOUS BLD VENIPUNCTURE: CPT | Mod: PO

## 2020-08-06 PROCEDURE — 80053 COMPREHEN METABOLIC PANEL: CPT

## 2020-08-07 ENCOUNTER — LAB VISIT (OUTPATIENT)
Dept: FAMILY MEDICINE | Facility: CLINIC | Age: 75
End: 2020-08-07
Payer: MEDICARE

## 2020-08-07 ENCOUNTER — TELEPHONE (OUTPATIENT)
Dept: SURGERY | Facility: HOSPITAL | Age: 75
End: 2020-08-07

## 2020-08-07 DIAGNOSIS — Z11.9 SCREENING EXAMINATION FOR INFECTIOUS DISEASE: ICD-10-CM

## 2020-08-07 PROCEDURE — U0003 INFECTIOUS AGENT DETECTION BY NUCLEIC ACID (DNA OR RNA); SEVERE ACUTE RESPIRATORY SYNDROME CORONAVIRUS 2 (SARS-COV-2) (CORONAVIRUS DISEASE [COVID-19]), AMPLIFIED PROBE TECHNIQUE, MAKING USE OF HIGH THROUGHPUT TECHNOLOGIES AS DESCRIBED BY CMS-2020-01-R: HCPCS

## 2020-08-07 NOTE — TELEPHONE ENCOUNTER
Spoke with pt to complete preop phone call. Pt scheduled for lumbar TAHIRA with Dr. Montoya on Monday, 8/10. States she was not instructed to hold any medications prior to procedure and has been taking her ASA, Plavix and Fish Oil. Please call patient to cancel and reschedule as necessary. Thank you!

## 2020-08-08 LAB — SARS-COV-2 RNA RESP QL NAA+PROBE: NOT DETECTED

## 2020-08-09 DIAGNOSIS — Z95.5 S/P CORONARY ARTERY STENT PLACEMENT: Chronic | ICD-10-CM

## 2020-08-09 DIAGNOSIS — I10 ESSENTIAL HYPERTENSION: ICD-10-CM

## 2020-08-09 DIAGNOSIS — I25.10 CORONARY ARTERY DISEASE INVOLVING NATIVE CORONARY ARTERY OF NATIVE HEART WITHOUT ANGINA PECTORIS: Chronic | ICD-10-CM

## 2020-08-09 DIAGNOSIS — E78.5 DYSLIPIDEMIA: ICD-10-CM

## 2020-08-09 DIAGNOSIS — E11.9 TYPE 2 DIABETES MELLITUS WITHOUT COMPLICATION: ICD-10-CM

## 2020-08-10 ENCOUNTER — TELEPHONE (OUTPATIENT)
Dept: PAIN MEDICINE | Facility: CLINIC | Age: 75
End: 2020-08-10

## 2020-08-10 LAB
ASCENDING AORTA: 3.06 CM
AV INDEX (PROSTH): 0.55
AV MEAN GRADIENT: 7 MMHG
AV PEAK GRADIENT: 12 MMHG
AV VALVE AREA: 1.94 CM2
AV VELOCITY RATIO: 0.6
BSA FOR ECHO PROCEDURE: 2.09 M2
CV ECHO LV RWT: 0.37 CM
DOP CALC AO PEAK VEL: 1.75 M/S
DOP CALC AO VTI: 46.83 CM
DOP CALC LVOT AREA: 3.5 CM2
DOP CALC LVOT DIAMETER: 2.12 CM
DOP CALC LVOT PEAK VEL: 1.05 M/S
DOP CALC LVOT STROKE VOLUME: 91.06 CM3
DOP CALCLVOT PEAK VEL VTI: 25.81 CM
E WAVE DECELERATION TIME: 250.88 MSEC
E/A RATIO: 1.5
E/E' RATIO: 20.5 M/S
ECHO LV POSTERIOR WALL: 0.93 CM (ref 0.6–1.1)
FRACTIONAL SHORTENING: 22 % (ref 28–44)
INTERVENTRICULAR SEPTUM: 1.06 CM (ref 0.6–1.1)
IVRT: 85.63 MSEC
LA MAJOR: 5.11 CM
LA MINOR: 5.01 CM
LA WIDTH: 3.67 CM
LEFT ATRIUM SIZE: 5.02 CM
LEFT ATRIUM VOLUME INDEX: 39 ML/M2
LEFT ATRIUM VOLUME: 79.23 CM3
LEFT INTERNAL DIMENSION IN SYSTOLE: 3.9 CM (ref 2.1–4)
LEFT VENTRICLE DIASTOLIC VOLUME INDEX: 59.06 ML/M2
LEFT VENTRICLE DIASTOLIC VOLUME: 119.87 ML
LEFT VENTRICLE MASS INDEX: 90 G/M2
LEFT VENTRICLE SYSTOLIC VOLUME INDEX: 32.4 ML/M2
LEFT VENTRICLE SYSTOLIC VOLUME: 65.76 ML
LEFT VENTRICULAR INTERNAL DIMENSION IN DIASTOLE: 5.03 CM (ref 3.5–6)
LEFT VENTRICULAR MASS: 182.55 G
LV LATERAL E/E' RATIO: 15.38 M/S
LV SEPTAL E/E' RATIO: 30.75 M/S
MV PEAK A VEL: 0.82 M/S
MV PEAK E VEL: 1.23 M/S
MV STENOSIS PRESSURE HALF TIME: 72.75 MS
MV VALVE AREA P 1/2 METHOD: 3.02 CM2
PISA MRMAX VEL: 0.05 M/S
PISA TR MAX VEL: 3.04 M/S
PULM VEIN S/D RATIO: 1
PV PEAK D VEL: 0.56 M/S
PV PEAK S VEL: 0.56 M/S
RA MAJOR: 4.83 CM
RA WIDTH: 3.92 CM
RIGHT VENTRICULAR END-DIASTOLIC DIMENSION: 2.95 CM
SINUS: 2.94 CM
STJ: 2.58 CM
TDI LATERAL: 0.08 M/S
TDI SEPTAL: 0.04 M/S
TDI: 0.06 M/S
TR MAX PG: 37 MMHG
TRICUSPID ANNULAR PLANE SYSTOLIC EXCURSION: 1.39 CM

## 2020-08-10 RX ORDER — ISOSORBIDE MONONITRATE 60 MG/1
TABLET, EXTENDED RELEASE ORAL
Qty: 90 TABLET | Refills: 4 | Status: SHIPPED | OUTPATIENT
Start: 2020-08-10 | End: 2021-08-16

## 2020-08-10 NOTE — TELEPHONE ENCOUNTER
----- Message from Rosalina  sent at 8/10/2020 11:35 AM CDT -----  Regarding: procedure  Contact: pt  Type: Needs Medical Advice    Who Called:      Best Call Back Number:     Additional Information: Requesting a call back from Nurse, regarding pt has questions about her procedure that was suppose to be done today , no has called her back to let her know if to come in or not ,please call back with advice

## 2020-08-11 ENCOUNTER — PATIENT MESSAGE (OUTPATIENT)
Dept: PAIN MEDICINE | Facility: CLINIC | Age: 75
End: 2020-08-11

## 2020-08-11 DIAGNOSIS — Z11.9 SCREENING EXAMINATION FOR INFECTIOUS DISEASE: Primary | ICD-10-CM

## 2020-08-13 ENCOUNTER — OFFICE VISIT (OUTPATIENT)
Dept: CARDIOLOGY | Facility: CLINIC | Age: 75
End: 2020-08-13
Attending: INTERNAL MEDICINE
Payer: MEDICARE

## 2020-08-13 ENCOUNTER — TELEPHONE (OUTPATIENT)
Dept: PAIN MEDICINE | Facility: CLINIC | Age: 75
End: 2020-08-13

## 2020-08-13 VITALS
SYSTOLIC BLOOD PRESSURE: 122 MMHG | WEIGHT: 205.69 LBS | HEIGHT: 66 IN | HEART RATE: 66 BPM | BODY MASS INDEX: 33.06 KG/M2 | DIASTOLIC BLOOD PRESSURE: 57 MMHG

## 2020-08-13 DIAGNOSIS — E78.5 DYSLIPIDEMIA: ICD-10-CM

## 2020-08-13 DIAGNOSIS — Z95.5 S/P CORONARY ARTERY STENT PLACEMENT: Primary | Chronic | ICD-10-CM

## 2020-08-13 DIAGNOSIS — I25.10 CORONARY ARTERY DISEASE INVOLVING NATIVE CORONARY ARTERY OF NATIVE HEART WITHOUT ANGINA PECTORIS: Chronic | ICD-10-CM

## 2020-08-13 DIAGNOSIS — I47.10 SVT (SUPRAVENTRICULAR TACHYCARDIA): ICD-10-CM

## 2020-08-13 DIAGNOSIS — Z78.9 STATIN INTOLERANCE: ICD-10-CM

## 2020-08-13 DIAGNOSIS — I10 ESSENTIAL HYPERTENSION: ICD-10-CM

## 2020-08-13 DIAGNOSIS — Z95.1 S/P CABG (CORONARY ARTERY BYPASS GRAFT): ICD-10-CM

## 2020-08-13 PROCEDURE — 3008F PR BODY MASS INDEX (BMI) DOCUMENTED: ICD-10-PCS | Mod: CPTII,S$GLB,, | Performed by: INTERNAL MEDICINE

## 2020-08-13 PROCEDURE — 3008F BODY MASS INDEX DOCD: CPT | Mod: CPTII,S$GLB,, | Performed by: INTERNAL MEDICINE

## 2020-08-13 PROCEDURE — 3078F PR MOST RECENT DIASTOLIC BLOOD PRESSURE < 80 MM HG: ICD-10-PCS | Mod: CPTII,S$GLB,, | Performed by: INTERNAL MEDICINE

## 2020-08-13 PROCEDURE — 99999 PR PBB SHADOW E&M-EST. PATIENT-LVL IV: ICD-10-PCS | Mod: PBBFAC,,, | Performed by: INTERNAL MEDICINE

## 2020-08-13 PROCEDURE — 99214 OFFICE O/P EST MOD 30 MIN: CPT | Mod: S$GLB,,, | Performed by: INTERNAL MEDICINE

## 2020-08-13 PROCEDURE — 1159F PR MEDICATION LIST DOCUMENTED IN MEDICAL RECORD: ICD-10-PCS | Mod: S$GLB,,, | Performed by: INTERNAL MEDICINE

## 2020-08-13 PROCEDURE — 1126F AMNT PAIN NOTED NONE PRSNT: CPT | Mod: S$GLB,,, | Performed by: INTERNAL MEDICINE

## 2020-08-13 PROCEDURE — 99214 PR OFFICE/OUTPT VISIT, EST, LEVL IV, 30-39 MIN: ICD-10-PCS | Mod: S$GLB,,, | Performed by: INTERNAL MEDICINE

## 2020-08-13 PROCEDURE — 1126F PR PAIN SEVERITY QUANTIFIED, NO PAIN PRESENT: ICD-10-PCS | Mod: S$GLB,,, | Performed by: INTERNAL MEDICINE

## 2020-08-13 PROCEDURE — 1101F PT FALLS ASSESS-DOCD LE1/YR: CPT | Mod: CPTII,S$GLB,, | Performed by: INTERNAL MEDICINE

## 2020-08-13 PROCEDURE — 3074F PR MOST RECENT SYSTOLIC BLOOD PRESSURE < 130 MM HG: ICD-10-PCS | Mod: CPTII,S$GLB,, | Performed by: INTERNAL MEDICINE

## 2020-08-13 PROCEDURE — 3078F DIAST BP <80 MM HG: CPT | Mod: CPTII,S$GLB,, | Performed by: INTERNAL MEDICINE

## 2020-08-13 PROCEDURE — 99999 PR PBB SHADOW E&M-EST. PATIENT-LVL IV: CPT | Mod: PBBFAC,,, | Performed by: INTERNAL MEDICINE

## 2020-08-13 PROCEDURE — 1101F PR PT FALLS ASSESS DOC 0-1 FALLS W/OUT INJ PAST YR: ICD-10-PCS | Mod: CPTII,S$GLB,, | Performed by: INTERNAL MEDICINE

## 2020-08-13 PROCEDURE — 1159F MED LIST DOCD IN RCRD: CPT | Mod: S$GLB,,, | Performed by: INTERNAL MEDICINE

## 2020-08-13 PROCEDURE — 3074F SYST BP LT 130 MM HG: CPT | Mod: CPTII,S$GLB,, | Performed by: INTERNAL MEDICINE

## 2020-08-13 RX ORDER — EZETIMIBE 10 MG/1
10 TABLET ORAL DAILY
Qty: 90 TABLET | Refills: 3 | Status: SHIPPED | OUTPATIENT
Start: 2020-08-13 | End: 2021-08-18 | Stop reason: SDUPTHER

## 2020-08-13 RX ORDER — PITAVASTATIN CALCIUM 2.09 MG/1
2 TABLET, FILM COATED ORAL NIGHTLY
Qty: 90 TABLET | Refills: 3 | Status: SHIPPED | OUTPATIENT
Start: 2020-08-13 | End: 2021-01-05 | Stop reason: SDUPTHER

## 2020-08-13 NOTE — TELEPHONE ENCOUNTER
Informed pt I would send message to nurse for her to cb to schedule procedure. Pt understood. All questions answered.   Jeff Lund MA  [facility]

## 2020-08-13 NOTE — PROGRESS NOTES
Subjective:    Patient ID:  Mayra Pinedo is a 75 y.o. female who presents for follow-up of No chief complaint on file.      HPI  Here for follow up of CABG- PCI (NICOLE 7/16)/atrial tach s/p RFA (6/17)/mild AS. Limited by back pain. Admits to increasing SANCHEZ. No CP    Review of Systems   Constitution: Negative for malaise/fatigue.   Eyes: Negative for blurred vision.   Cardiovascular: Negative for chest pain, claudication, cyanosis, dyspnea on exertion, irregular heartbeat, leg swelling, near-syncope, orthopnea, palpitations, paroxysmal nocturnal dyspnea and syncope.   Respiratory: Negative for cough and shortness of breath.    Hematologic/Lymphatic: Does not bruise/bleed easily.   Musculoskeletal: Negative for back pain, falls, joint pain, muscle cramps, muscle weakness and myalgias.   Gastrointestinal: Negative for abdominal pain, change in bowel habit, nausea and vomiting.   Genitourinary: Negative for urgency.   Neurological: Negative for dizziness, focal weakness and light-headedness.       Past Medical History:   Diagnosis Date    Anticoagulant long-term use     Plavix and ASA therapy    Arthritis     Asthma     seasonal, allergies    Blood in stool     Blood transfusion     Coronary artery disease     stent X3    Coronary artery disease involving native coronary artery of native heart without angina pectoris 6/1/2016    Diabetes mellitus     Dyslipidemia 6/1/2016    GERD (gastroesophageal reflux disease)     Hypertension     S/P CABG (coronary artery bypass graft) x 3 1991 6/1/2016    S/P CABG  x 3 1991 LIMA-LAD, VG-D (occluded), VG-RCA    S/P coronary artery stent placement 6/1/2016 7/2016 PDA- 2.25 x 24, synergy  8/15 lad/diag promus 2.5x16  8/2009 vg-rca- promus 3.5x8    Statin intolerance 11/30/2017    Failed prava/lipitor/crestor. Tolerates livalo    SVT (supraventricular tachycardia) 4/24/2017 6/2017 EPS 1.  Normal baseline intervals. 2.  No evidence of an accessory pathway. 3.   Atrial tachycardia mapped to the posterior aspect near the IVC, status post radiofrequency ablation.     Patient Active Problem List   Diagnosis    S/P coronary artery stent placement    Coronary artery disease involving native coronary artery of native heart without angina pectoris    Essential hypertension    Dyslipidemia    Type 2 diabetes mellitus without complication    Aortic sclerosis    S/P CABG (coronary artery bypass graft) x 3 1991    SVT (supraventricular tachycardia)    Severe obesity (BMI 35.0-39.9) with comorbidity    Statin intolerance    Lumbar radiculopathy    Lumbar spondylosis        Objective:     Vitals:    08/13/20 0901   BP: (!) 122/57   Pulse: 66        Physical Exam   Constitutional: She is oriented to person, place, and time. She appears well-developed and well-nourished.   HENT:   Head: Normocephalic.   Eyes: Conjunctivae are normal.   Neck: Normal range of motion. Neck supple. No JVD present.   Cardiovascular: Normal rate, regular rhythm, normal heart sounds and intact distal pulses.   Pulses:       Carotid pulses are 2+ on the right side and 2+ on the left side.       Radial pulses are 2+ on the right side and 2+ on the left side.        Dorsalis pedis pulses are 2+ on the right side and 2+ on the left side.        Posterior tibial pulses are 2+ on the right side and 2+ on the left side.   Well healed midline sternal incision.     Pulmonary/Chest: Effort normal and breath sounds normal.   Abdominal: Soft. Bowel sounds are normal.   Musculoskeletal:         General: No tenderness or edema.   Neurological: She is alert and oriented to person, place, and time. Gait normal.   Skin: Skin is warm, dry and intact. No cyanosis. Nails show no clubbing.   Psychiatric: She has a normal mood and affect. Her speech is normal and behavior is normal. Thought content normal.             ..    Chemistry        Component Value Date/Time     08/06/2020 0814    K 4.3 08/06/2020 0814      08/06/2020 0814    CO2 26 08/06/2020 0814    BUN 8 08/06/2020 0814    CREATININE 0.9 08/06/2020 0814     (H) 08/06/2020 0814        Component Value Date/Time    CALCIUM 9.4 08/06/2020 0814    ALKPHOS 51 (L) 08/06/2020 0814    AST 22 08/06/2020 0814    ALT 19 08/06/2020 0814    BILITOT 0.4 08/06/2020 0814    ESTGFRAFRICA >60.0 08/06/2020 0814    EGFRNONAA >60.0 08/06/2020 0814            ..  Lab Results   Component Value Date    CHOL 148 08/06/2020    CHOL 129 07/17/2019    CHOL 120 10/03/2018     Lab Results   Component Value Date    HDL 28 (L) 08/06/2020    HDL 28 (L) 07/17/2019    HDL 38 (L) 10/03/2018     Lab Results   Component Value Date    LDLCALC 44.6 (L) 08/06/2020    LDLCALC 46.0 (L) 07/17/2019    LDLCALC 51.2 (L) 10/03/2018     Lab Results   Component Value Date    TRIG 377 (H) 08/06/2020    TRIG 275 (H) 07/17/2019    TRIG 154 (H) 10/03/2018     Lab Results   Component Value Date    CHOLHDL 18.9 (L) 08/06/2020    CHOLHDL 21.7 07/17/2019    CHOLHDL 31.7 10/03/2018     ..  Lab Results   Component Value Date    WBC 4.98 08/06/2020    HGB 12.0 08/06/2020    HCT 39.1 08/06/2020     (H) 08/06/2020     08/06/2020       Test(s) Reviewed  I have reviewed the following in detail:  [] Stress test   [] Angiography   [x] Echocardiogram   [x] Labs   [x] Other:       Assessment:         ICD-10-CM ICD-9-CM   1. S/P coronary artery stent placement  Z95.5 V45.82   2. S/P CABG (coronary artery bypass graft) x 3 1991  Z95.1 V45.81   3. Statin intolerance  Z78.9 995.27   4. Dyslipidemia  E78.5 272.4   5. SVT (supraventricular tachycardia)  I47.1 427.89   6. Essential hypertension  I10 401.9   7. Coronary artery disease involving native coronary artery of native heart without angina pectoris  I25.10 414.01     Problem List Items Addressed This Visit     S/P coronary artery stent placement - Primary (Chronic)    Overview     7/2016 PDA- 2.25 x 24, synergy    8/15 lad/diag promus 2.5x16    8/2009  vg-rca- promus 3.5x8         Coronary artery disease involving native coronary artery of native heart without angina pectoris (Chronic)    Essential hypertension    Dyslipidemia    S/P CABG (coronary artery bypass graft) x 3 1991    Overview     S/P CABG  x 3 1991 LIMA-LAD, VG-D (occluded), VG-RCA         SVT (supraventricular tachycardia)    Overview     6/2017 EPS  1.  Normal baseline intervals.  2.  No evidence of an accessory pathway.  3.  Atrial tachycardia mapped to the posterior aspect near the IVC, status post radiofrequency ablation.         Statin intolerance    Overview     Failed prava/lipitor/crestor. Tolerates livalo                Plan:           Return to clinic 6 months   Low level/low impact aerobic exercise 5x's/wk. Heart healthy diet and risk factor modification.    See labs and med orders.  monico stress call results  Decrease livalo add zetia if TG still elevated then increase fibrate    Portions of this note may have been created with voice recognition software.  Grammatical, syntax and spelling errors may be inevitable.

## 2020-08-13 NOTE — TELEPHONE ENCOUNTER
----- Message from Charlene Campbell sent at 8/10/2020  4:05 PM CDT -----  Regarding: rtc  Contact: CALEB GARDNER [[id number]]  Patient is returning nurse's phone call.  Patient stated let phone ring a while, to give her a chance to get to the phone.   Please call patient back at [phone].

## 2020-08-17 NOTE — TELEPHONE ENCOUNTER
Spoke with patient and her procedure has been rescheduled. All instructions and medications reviewed.  Patient also rescheduled for covid testing and follow up appointment.

## 2020-08-18 ENCOUNTER — HOSPITAL ENCOUNTER (OUTPATIENT)
Dept: RADIOLOGY | Facility: HOSPITAL | Age: 75
Discharge: HOME | End: 2020-08-18
Attending: OBSTETRICS & GYNECOLOGY
Payer: MEDICARE

## 2020-08-18 ENCOUNTER — OFFICE VISIT (OUTPATIENT)
Dept: OBSTETRICS AND GYNECOLOGY | Facility: CLINIC | Age: 75
End: 2020-08-18
Payer: MEDICARE

## 2020-08-18 VITALS
WEIGHT: 205.25 LBS | BODY MASS INDEX: 33.13 KG/M2 | DIASTOLIC BLOOD PRESSURE: 74 MMHG | SYSTOLIC BLOOD PRESSURE: 130 MMHG

## 2020-08-18 DIAGNOSIS — Z12.31 OTHER SCREENING MAMMOGRAM: ICD-10-CM

## 2020-08-18 DIAGNOSIS — N95.2 VAGINAL ATROPHY: Primary | ICD-10-CM

## 2020-08-18 PROCEDURE — 77063 MAMMO DIGITAL SCREENING BILAT WITH TOMOSYNTHESIS_CAD: ICD-10-PCS | Mod: 26,,, | Performed by: RADIOLOGY

## 2020-08-18 PROCEDURE — 77067 SCR MAMMO BI INCL CAD: CPT | Mod: TC,PN

## 2020-08-18 PROCEDURE — 99999 PR PBB SHADOW E&M-EST. PATIENT-LVL IV: CPT | Mod: PBBFAC,,, | Performed by: OBSTETRICS & GYNECOLOGY

## 2020-08-18 PROCEDURE — 77063 BREAST TOMOSYNTHESIS BI: CPT | Mod: 26,,, | Performed by: RADIOLOGY

## 2020-08-18 PROCEDURE — 99999 PR PBB SHADOW E&M-EST. PATIENT-LVL IV: ICD-10-PCS | Mod: PBBFAC,,, | Performed by: OBSTETRICS & GYNECOLOGY

## 2020-08-18 PROCEDURE — G0101 CA SCREEN;PELVIC/BREAST EXAM: HCPCS | Mod: S$GLB,,, | Performed by: OBSTETRICS & GYNECOLOGY

## 2020-08-18 PROCEDURE — 77067 SCR MAMMO BI INCL CAD: CPT | Mod: 26,,, | Performed by: RADIOLOGY

## 2020-08-18 PROCEDURE — G0101 PR CA SCREEN;PELVIC/BREAST EXAM: ICD-10-PCS | Mod: S$GLB,,, | Performed by: OBSTETRICS & GYNECOLOGY

## 2020-08-18 PROCEDURE — 77067 MAMMO DIGITAL SCREENING BILAT WITH TOMOSYNTHESIS_CAD: ICD-10-PCS | Mod: 26,,, | Performed by: RADIOLOGY

## 2020-08-18 RX ORDER — FLUCONAZOLE 150 MG/1
150 TABLET ORAL ONCE
Qty: 1 TABLET | Refills: 3 | Status: SHIPPED | OUTPATIENT
Start: 2020-08-18 | End: 2020-08-18

## 2020-08-18 RX ORDER — METFORMIN HYDROCHLORIDE 500 MG/1
500 TABLET ORAL DAILY
COMMUNITY
Start: 2020-06-19 | End: 2023-02-08

## 2020-08-18 RX ORDER — ESTRADIOL 0.1 MG/G
2 CREAM VAGINAL
Qty: 42.5 G | Refills: 5 | Status: SHIPPED | OUTPATIENT
Start: 2020-08-20 | End: 2020-08-19

## 2020-08-18 RX ORDER — NITROGLYCERIN 0.4 MG/1
0.4 TABLET SUBLINGUAL
COMMUNITY
Start: 2015-08-03 | End: 2021-08-18 | Stop reason: SDUPTHER

## 2020-08-18 NOTE — PROGRESS NOTES
Chief Complaint   Patient presents with    Well Woman       History and Physical:  No LMP recorded (exact date). Patient has had a hysterectomy.       Mayra Pinedo is a 75 y.o.  female who presents today for her routine annual GYN exam. The patient has no Gynecology complaints today. Vaginal irritation and itching x 3-4 weeks      Allergies:   Review of patient's allergies indicates:   Allergen Reactions    Oxycodone-acetaminophen Hives    Digitalis glycosides Other (See Comments)     Severe joint pain    Digoxin Other (See Comments)     Numbness and painful to move    Statins-hmg-coa reductase inhibitors      Myalgias, alpoecia    Erythromycin Other (See Comments)     Pt reports tachycardia and andres skin to neck and face    Latex, natural rubber Nausea Only and Rash     Nausea at dentist    Penicillins Rash    Sulfa (sulfonamide antibiotics) Rash       Past Medical History:   Diagnosis Date    Anticoagulant long-term use     Plavix and ASA therapy    Arthritis     Asthma     seasonal, allergies    Blood in stool     Blood transfusion     Coronary artery disease     stent X3    Coronary artery disease involving native coronary artery of native heart without angina pectoris 6/1/2016    Diabetes mellitus     Dyslipidemia 6/1/2016    GERD (gastroesophageal reflux disease)     Hypertension     S/P CABG (coronary artery bypass graft) x 3 1991 6/1/2016    S/P CABG  x 3 1991 LIMA-LAD, VG-D (occluded), VG-RCA    S/P coronary artery stent placement 6/1/2016 7/2016 PDA- 2.25 x 24, synergy  8/15 lad/diag promus 2.5x16  8/2009 vg-rca- promus 3.5x8    Statin intolerance 11/30/2017    Failed prava/lipitor/crestor. Tolerates livalo    SVT (supraventricular tachycardia) 4/24/2017 6/2017 EPS 1.  Normal baseline intervals. 2.  No evidence of an accessory pathway. 3.  Atrial tachycardia mapped to the posterior aspect near the IVC, status post radiofrequency ablation.       Past Surgical  History:   Procedure Laterality Date    ACHILLES TENDON SURGERY Left     torn tendon    CARDIAC SURGERY      x 4--22 years ago    cataract surgery      CHOLECYSTECTOMY      cholescystectomy      COLONOSCOPY  7/20/2006  [facility]    One 1 to 2 mm polyp in the proximal descending colon.  HYPERPLASTIC POLYP.    Internal hemorrhoids.    CORONARY STENT PLACEMENT      2009 X 1; 2014 X 1    EPIDURAL STEROID INJECTION INTO LUMBAR SPINE N/A 5/19/2020    Procedure: Injection-steroid-epidural-lumbar L5/S1;  Surgeon: Mannie Montoya MD;  Location: [facility] OR;  Service: Pain Management;  Laterality: N/A;    HYSTERECTOMY      INJECTION OF ANESTHETIC AGENT AROUND MEDIAL BRANCH NERVES INNERVATING LUMBAR FACET JOINT Bilateral 6/25/2020    Procedure: Block-nerve-medial branch-lumbar L3/4, L4/5, L5/S1;  Surgeon: Mannie Montoya MD;  Location: [facility] OR;  Service: Pain Management;  Laterality: Bilateral;       MEDS:   Current Outpatient Medications on File Prior to Visit   Medication Sig Dispense Refill    ALBUTEROL SULFATE (VENTOLIN HFA INHL) Inhale 2 Inhalers into the lungs as needed.       aspirin (ECOTRIN) 81 MG EC tablet Take 81 mg by mouth once daily.        carvediloL (COREG) 25 MG tablet Take 1 tablet by mouth twice daily 180 tablet 4    cetirizine (ZYRTEC) 10 MG tablet Take 1 tablet by mouth every evening.       clopidogreL (PLAVIX) 75 mg tablet TAKE 1 TABLET BY MOUTH ONCE DAILY 90 tablet 4    diclofenac sodium 1 % Gel 4 g.      ezetimibe (ZETIA) 10 mg tablet Take 1 tablet (10 mg total) by mouth once daily. 90 tablet 3    fenofibrate (TRICOR) 54 MG tablet Take 1 tablet (54 mg total) by mouth once daily. 90 tablet 4    isosorbide mononitrate (IMDUR) 60 MG 24 hr tablet Take 1 tablet by mouth in the evening 90 tablet 4    losartan (COZAAR) 25 MG tablet Take 1 tablet (25 mg total) by mouth every morning. 90 tablet 6    metFORMIN (GLUCOPHAGE) 500 MG tablet Take 500 mg by mouth once daily.      metFORMIN  (GLUCOPHAGE-XR) 500 MG 24 hr tablet TAKE ONE TABLET BY MOUTH TWICE DAILY WITH MEALS 180 tablet 4    nitroGLYCERIN (NITROSTAT) 0.4 MG SL tablet Place 0.4 mg under the tongue.      omega-3 fatty acids-vitamin E (FISH OIL) 1,000 mg Cap Take 2,400 mg by mouth 2 (two) times daily.       pantoprazole (PROTONIX) 40 MG tablet Take 40 mg by mouth once daily.      pitavastatin calcium (LIVALO) 2 mg Tab tablet Take 1 tablet (2 mg total) by mouth every evening. 90 tablet 3     No current facility-administered medications on file prior to visit.        OB History        6    Para   6    Term   3            AB        Living           SAB        TAB        Ectopic        Multiple        Live Births                     Social History     Socioeconomic History    Marital status:      Spouse name: Not on file    Number of children: Not on file    Years of education: Not on file    Highest education level: Not on file   Occupational History    Not on file   Social Needs    Financial resource strain: Not on file    Food insecurity     Worry: Not on file     Inability: Not on file    Transportation needs     Medical: Not on file     Non-medical: Not on file   Tobacco Use    Smoking status: Never Smoker    Smokeless tobacco: Never Used   Substance and Sexual Activity    Alcohol use: Yes     Comment: 3 glasses wine daily    Drug use: No    Sexual activity: Yes     Partners: Male     Birth control/protection: Surgical     Comment: hysterectomy    Lifestyle    Physical activity     Days per week: Not on file     Minutes per session: Not on file    Stress: Not on file   Relationships    Social connections     Talks on phone: Not on file     Gets together: Not on file     Attends [employer] service: Not on file     Active member of club or organization: Not on file     Attends meetings of clubs or organizations: Not on file     Relationship status: Not on file   Other Topics Concern    Are you pregnant  or think you may be? Not Asked    Breast-feeding Not Asked   Social History Narrative    Not on file       Family History   Problem Relation Age of Onset    Hypertension Sister     Heart disease Brother     Hypertension Brother     Coronary artery disease Brother     Obesity Brother     Hypertension Maternal Grandmother     Heart disease Maternal Grandmother     Heart attack Maternal Grandmother     Hypertension Maternal Grandfather     Heart disease Maternal Grandfather     Breast cancer Neg Hx     Ovarian cancer Neg Hx          Past medical and surgical history reviewed.   I have reviewed the patient's medical history in detail and updated the computerized patient record.        Review of System:   General: no chills, fever, night sweats, weight gain or weight loss  Psychological: no depression or suicidal ideation  Breasts: no new or changing breast lumps, nipple discharge or masses.  Respiratory: no cough, shortness of breath, or wheezing  Cardiovascular: no chest pain or dyspnea on exertion  Gastrointestinal: no abdominal pain, change in bowel habits, or black or bloody stools  Genito-Urinary: no incontinence, urinary frequency/urgency, pelvic pain or abnormal vaginal bleeding.  Musculoskeletal: no gait disturbance or muscular weakness      Physical Exam:   /74   Wt 93.1 kg (205 lb 4 oz)   LMP  (Exact Date)   BMI 33.13 kg/m²   Constitutional: She is oriented to person, place, and time. She appears well-developed and well-nourished. No distress.   HENT:   Head: Normocephalic and atraumatic.   Eyes: Conjunctivae and EOM are normal. No scleral icterus.   Neck: Normal range of motion. Neck supple. No tracheal deviation present.   Cardiovascular: Normal rate.    Pulmonary/Chest: Effort normal. No respiratory distress. She exhibits no tenderness.  Breasts: are symmetrical.   Right breast exhibits no inverted nipple, no mass, no nipple discharge, no skin change and no tenderness.   Left breast  exhibits no inverted nipple, no mass, no nipple discharge, no skin change and no tenderness.  Abdominal: Soft. She exhibits no distension and no mass. There is no tenderness. There is no rebound and no guarding.   Genitourinary:    External rectal exam shows no thrombosed external hemorrhoids.    Pelvic exam was performed with patient supine.   No labial fusion.   There is no rash, lesion or injury on the right labia.   There is no rash, lesion or injury on the left labia.   No bleeding and no signs of injury around the vaginal introitus, urethra is without lesions and well supported.    No vaginal discharge found. Wetprep; no clue cells or trichomonads. Budding yeast confirmed with KOH, whiff negative   No significant Cystocele, Enterocele or rectocele, and cuff well supported.   Bimanual exam:   The urethra and vagina are without palpable masses or tenderness.   Uterus and cervix are surgically absents, vaginal cuff is intact and well supported.   Right adnexum displays no mass and no tenderness.   Left adnexum displays no mass and no tenderness.  Musculoskeletal: Normal range of motion.   Lymphadenopathy: No inguinal adenopathy present.   Neurological: She is alert and oriented to person, place, and time. Coordination normal.   Skin: Skin is warm and dry. She is not diaphoretic.   Psychiatric: She has a normal mood and affect.        Assessment:   Normal annual GYN exam  1. Vaginal atrophy  estradioL (ESTRACE) 0.01 % (0.1 mg/gram) vaginal cream   Vulvovaginal candida     Plan:   PAP Not Needed  Mammogram  Diflucan, estrogen cream  Follow up in 1 year.

## 2020-08-19 ENCOUNTER — PATIENT MESSAGE (OUTPATIENT)
Dept: OBSTETRICS AND GYNECOLOGY | Facility: CLINIC | Age: 75
End: 2020-08-19

## 2020-08-19 RX ORDER — CONJUGATED ESTROGENS 0.62 MG/G
1 CREAM VAGINAL DAILY
Qty: 42.5 G | Refills: 1 | Status: SHIPPED | OUTPATIENT
Start: 2020-08-19 | End: 2022-08-29

## 2020-08-24 ENCOUNTER — LAB VISIT (OUTPATIENT)
Dept: FAMILY MEDICINE | Facility: CLINIC | Age: 75
End: 2020-08-24
Payer: MEDICARE

## 2020-08-24 ENCOUNTER — PATIENT MESSAGE (OUTPATIENT)
Dept: SURGERY | Facility: HOSPITAL | Age: 75
End: 2020-08-24

## 2020-08-24 DIAGNOSIS — Z11.9 SCREENING EXAMINATION FOR INFECTIOUS DISEASE: ICD-10-CM

## 2020-08-24 PROCEDURE — U0003 INFECTIOUS AGENT DETECTION BY NUCLEIC ACID (DNA OR RNA); SEVERE ACUTE RESPIRATORY SYNDROME CORONAVIRUS 2 (SARS-COV-2) (CORONAVIRUS DISEASE [COVID-19]), AMPLIFIED PROBE TECHNIQUE, MAKING USE OF HIGH THROUGHPUT TECHNOLOGIES AS DESCRIBED BY CMS-2020-01-R: HCPCS

## 2020-08-25 LAB — SARS-COV-2 RNA RESP QL NAA+PROBE: NOT DETECTED

## 2020-08-26 DIAGNOSIS — M54.16 LUMBAR RADICULOPATHY: Primary | ICD-10-CM

## 2020-08-26 DIAGNOSIS — Z03.818 ENCNTR FOR OBS FOR SUSP EXPSR TO OTH BIOLG AGENTS RULED OUT: ICD-10-CM

## 2020-08-27 ENCOUNTER — HOSPITAL ENCOUNTER (OUTPATIENT)
Facility: HOSPITAL | Age: 75
Discharge: HOME | End: 2020-08-27
Attending: ANESTHESIOLOGY | Admitting: ANESTHESIOLOGY
Payer: MEDICARE

## 2020-08-27 ENCOUNTER — HOSPITAL ENCOUNTER (OUTPATIENT)
Dept: RADIOLOGY | Facility: HOSPITAL | Age: 75
Discharge: HOME | End: 2020-08-27
Attending: ANESTHESIOLOGY | Admitting: ANESTHESIOLOGY
Payer: MEDICARE

## 2020-08-27 DIAGNOSIS — M54.16 LUMBAR RADICULOPATHY: Primary | ICD-10-CM

## 2020-08-27 DIAGNOSIS — M54.16 LUMBAR RADICULOPATHY: ICD-10-CM

## 2020-08-27 LAB — GLUCOSE SERPL-MCNC: 112 MG/DL (ref 70–110)

## 2020-08-27 PROCEDURE — 25500020 PHARM REV CODE 255: Mod: PO | Performed by: ANESTHESIOLOGY

## 2020-08-27 PROCEDURE — A4216 STERILE WATER/SALINE, 10 ML: HCPCS | Mod: PO | Performed by: ANESTHESIOLOGY

## 2020-08-27 PROCEDURE — 82962 GLUCOSE BLOOD TEST: CPT | Mod: PO | Performed by: ANESTHESIOLOGY

## 2020-08-27 PROCEDURE — 25000003 PHARM REV CODE 250: Mod: PO | Performed by: ANESTHESIOLOGY

## 2020-08-27 PROCEDURE — 62323 PR INJ LUMBAR/SACRAL, W/IMAGING GUIDANCE: ICD-10-PCS | Mod: ,,, | Performed by: ANESTHESIOLOGY

## 2020-08-27 PROCEDURE — 62323 NJX INTERLAMINAR LMBR/SAC: CPT | Mod: PO | Performed by: ANESTHESIOLOGY

## 2020-08-27 PROCEDURE — 76000 FLUOROSCOPY <1 HR PHYS/QHP: CPT | Mod: TC,PO

## 2020-08-27 PROCEDURE — 63600175 PHARM REV CODE 636 W HCPCS: Mod: PO | Performed by: ANESTHESIOLOGY

## 2020-08-27 PROCEDURE — 62323 NJX INTERLAMINAR LMBR/SAC: CPT | Mod: ,,, | Performed by: ANESTHESIOLOGY

## 2020-08-27 RX ORDER — ALPRAZOLAM 0.5 MG/1
1 TABLET, ORALLY DISINTEGRATING ORAL ONCE AS NEEDED
Status: COMPLETED | OUTPATIENT
Start: 2020-08-27 | End: 2020-08-27

## 2020-08-27 RX ORDER — METHYLPREDNISOLONE ACETATE 80 MG/ML
INJECTION, SUSPENSION INTRA-ARTICULAR; INTRALESIONAL; INTRAMUSCULAR; SOFT TISSUE
Status: DISCONTINUED | OUTPATIENT
Start: 2020-08-27 | End: 2020-08-27 | Stop reason: HOSPADM

## 2020-08-27 RX ORDER — LIDOCAINE HYDROCHLORIDE 10 MG/ML
INJECTION, SOLUTION EPIDURAL; INFILTRATION; INTRACAUDAL; PERINEURAL
Status: DISCONTINUED | OUTPATIENT
Start: 2020-08-27 | End: 2020-08-27 | Stop reason: HOSPADM

## 2020-08-27 RX ORDER — SODIUM CHLORIDE 9 MG/ML
INJECTION, SOLUTION INTRAMUSCULAR; INTRAVENOUS; SUBCUTANEOUS
Status: DISCONTINUED | OUTPATIENT
Start: 2020-08-27 | End: 2020-08-27 | Stop reason: HOSPADM

## 2020-08-27 RX ADMIN — ALPRAZOLAM 1 MG: 0.5 TABLET, ORALLY DISINTEGRATING ORAL at 12:08

## 2020-08-27 NOTE — DISCHARGE SUMMARY
[facility]  Discharge Note  Short Stay    Admit Date: 8/27/2020    Discharge Date: 8/27/2020    Attending Physician: Mannie Montoya MD     Discharge Provider: Mannie Montoya    Diagnoses:  Active Hospital Problems    Diagnosis  POA    *Lumbar radiculopathy [M54.16]  Yes      Resolved Hospital Problems   No resolved problems to display.       Discharged Condition: good    Final Diagnoses: Lumbar radiculopathy [M54.16]    Disposition: Home or Self Care    Hospital Course: no complications, uneventful    Outcome of Hospitalization, Treatment, Procedure, or Surgery:  Patient was admitted for outpatient procedure. The patient underwent procedure without complications and are discharged home    Follow up/Patient Instructions:  Follow up as scheduled in Pain Management clinic in 3-4 weeks/Patient has received instructions and follow up date and time    Medications:  Continue previous medications    Discharge Procedure Orders   Call MD for:  temperature >100.4     Call MD for:  severe uncontrolled pain     Call MD for:  redness, tenderness, or signs of infection (pain, swelling, redness, odor or green/yellow discharge around incision site)     Call MD for:  severe persistent headache     No dressing needed         Discharge Procedure Orders (must include Diet, Follow-up, Activity):   Discharge Procedure Orders (must include Diet, Follow-up, Activity)   Call MD for:  temperature >100.4     Call MD for:  severe uncontrolled pain     Call MD for:  redness, tenderness, or signs of infection (pain, swelling, redness, odor or green/yellow discharge around incision site)     Call MD for:  severe persistent headache     No dressing needed

## 2020-08-27 NOTE — OP NOTE
PROCEDURE DATE: 8/27/2020    Lumbar Interlaminar Epidural Steroid Injection under Fluoroscopic Guidance, AP Approach.    Procedure:   Interlaminar epidural steroid injection at L3/4 under fluoroscopic guidance.    Diagnosis: lUMBAR Radiculopathy    pOSTOP DIAGNOSIS: sAME    Physician: Mannie Montoya M.D.    Medications injected:80 mg methylprednisone with 4 ml of preservative free NaCl    Local anesthetic injected:    Lidocaine 1% 3 ml total    Sedation Medications: none    Estimated blood loss:  none    Complications:  none    Technique:  Time-out taken to identify patient and procedure prior to starting the procedure.  With the patient laying in a prone position, the area was prepped and draped in the usual sterile fashion using ChloraPrep and a fenestrated drape.  After determining the target level with an AP fluoroscopic view, local anesthetic was given using a 25-gauge 1.5 inch needle by raising a wheal and then infiltrating toward the interlaminar entry space.  A 3.5inch 20-gauge Touhy needle was introduced under AP fluoroscopic guidance to the interlaminar space of L3/4. Once the trajectory was established, the needle was visualized in the lateral view and advanced using loss of resistance technique. Once in the desired position, omnipaque contrast was injected to confirm placement and there was no vascular uptake nor intrathecal spread.  The medication was then injected slowly. The patient tolerated the procedure well.      The patient was monitored after the procedure.   They were given post-procedure and discharge instructions to follow at home.  The patient was discharged in a stable condition.

## 2020-08-27 NOTE — DISCHARGE INSTRUCTIONS
Home Care Instructions    Apply ice pack to injection site for 20 minute periods for the first 24 hours for soreness/discomfort at injection site  DO NOT USE HEAT FOR 24 HOURS  Keep site clean and dry for 24 hours. If Band-Aid present remove when desired.  Do not drive until tomorrow  Take care when walking after a lumbar injection    STEROIDS or RADIOFREQUENCY  Make take 10-14 days for full affects  Avoid strenuous exercises for 2 days      SEE IMMEDIATE MEDICAL HELP FOR:  Severe increase in your usual pain or appearance of new pain  Prolonged or increasing weakness or numbness in the legs or arms  Drainage, redness, active bleeding, or increased swelling at the injection site  Temperature over 100.0 degrees F.  Headache that increases when your head is upright and decrease when you lie flat    CALL 911 OR GO DIRECTLY TO EMERGENCY DEPARTMENT FOR:  Shortness of breath, chest pain, or problems breathing

## 2020-08-28 ENCOUNTER — OFFICE VISIT (OUTPATIENT)
Dept: DERMATOLOGY | Facility: CLINIC | Age: 75
End: 2020-08-28
Payer: MEDICARE

## 2020-08-28 VITALS — WEIGHT: 205 LBS | BODY MASS INDEX: 32.95 KG/M2 | HEIGHT: 66 IN

## 2020-08-28 VITALS
SYSTOLIC BLOOD PRESSURE: 133 MMHG | RESPIRATION RATE: 16 BRPM | BODY MASS INDEX: 32.95 KG/M2 | DIASTOLIC BLOOD PRESSURE: 63 MMHG | HEART RATE: 59 BPM | HEIGHT: 66 IN | OXYGEN SATURATION: 99 % | TEMPERATURE: 97 F | WEIGHT: 205 LBS

## 2020-08-28 DIAGNOSIS — D18.01 ANGIOMA OF SKIN: ICD-10-CM

## 2020-08-28 DIAGNOSIS — L98.9 SKIN LESION: Primary | ICD-10-CM

## 2020-08-28 DIAGNOSIS — L30.9 ECZEMA, UNSPECIFIED TYPE: ICD-10-CM

## 2020-08-28 PROCEDURE — 1159F PR MEDICATION LIST DOCUMENTED IN MEDICAL RECORD: ICD-10-PCS | Mod: S$GLB,,, | Performed by: DERMATOLOGY

## 2020-08-28 PROCEDURE — 99213 PR OFFICE/OUTPT VISIT, EST, LEVL III, 20-29 MIN: ICD-10-PCS | Mod: S$GLB,,, | Performed by: DERMATOLOGY

## 2020-08-28 PROCEDURE — 99999 PR PBB SHADOW E&M-EST. PATIENT-LVL III: ICD-10-PCS | Mod: PBBFAC,,, | Performed by: DERMATOLOGY

## 2020-08-28 PROCEDURE — 3008F BODY MASS INDEX DOCD: CPT | Mod: CPTII,S$GLB,, | Performed by: DERMATOLOGY

## 2020-08-28 PROCEDURE — 3008F PR BODY MASS INDEX (BMI) DOCUMENTED: ICD-10-PCS | Mod: CPTII,S$GLB,, | Performed by: DERMATOLOGY

## 2020-08-28 PROCEDURE — 1101F PT FALLS ASSESS-DOCD LE1/YR: CPT | Mod: CPTII,S$GLB,, | Performed by: DERMATOLOGY

## 2020-08-28 PROCEDURE — 1159F MED LIST DOCD IN RCRD: CPT | Mod: S$GLB,,, | Performed by: DERMATOLOGY

## 2020-08-28 PROCEDURE — 1101F PR PT FALLS ASSESS DOC 0-1 FALLS W/OUT INJ PAST YR: ICD-10-PCS | Mod: CPTII,S$GLB,, | Performed by: DERMATOLOGY

## 2020-08-28 PROCEDURE — 99213 OFFICE O/P EST LOW 20 MIN: CPT | Mod: S$GLB,,, | Performed by: DERMATOLOGY

## 2020-08-28 PROCEDURE — 99999 PR PBB SHADOW E&M-EST. PATIENT-LVL III: CPT | Mod: PBBFAC,,, | Performed by: DERMATOLOGY

## 2020-08-28 RX ORDER — TRIAMCINOLONE ACETONIDE 0.25 MG/G
CREAM TOPICAL 2 TIMES DAILY
Qty: 15 G | Refills: 1 | Status: SHIPPED | OUTPATIENT
Start: 2020-08-28 | End: 2023-08-28

## 2020-08-28 NOTE — PROGRESS NOTES
Subjective:       Patient ID:  Mayra Pinedo is a 75 y.o. female who presents for   Chief Complaint   Patient presents with    Lesion     LOV 7/31/20  76 y/o F present for follow up lesion on right dorsal hand, x 8 months, patient states lesion completely healed, wants to have rechecked    Lesion on right calf x 3 months, The patient denies any change, including change in color, increase in size, or spontaneous bleeding, associated with this lesion.     Rash on right arm x months, Patient using Benadryl , rash comes and goes. +pruritus.      Hx of AK  no Phx of NMSC.  no Fhx of melanoma.    Past Medical History:   Diagnosis Date    Anticoagulant long-term use     Plavix and ASA therapy    Arthritis     Asthma     seasonal, allergies    Blood in stool     Blood transfusion     Coronary artery disease     stent X3    Coronary artery disease involving native coronary artery of native heart without angina pectoris 6/1/2016    Diabetes mellitus     Dyslipidemia 6/1/2016    GERD (gastroesophageal reflux disease)     Hypertension     S/P CABG (coronary artery bypass graft) x 3 1991 6/1/2016    S/P CABG  x 3 1991 LIMA-LAD, VG-D (occluded), VG-RCA    S/P coronary artery stent placement 6/1/2016 7/2016 PDA- 2.25 x 24, synergy  8/15 lad/diag promus 2.5x16  8/2009 vg-rca- promus 3.5x8    Statin intolerance 11/30/2017    Failed prava/lipitor/crestor. Tolerates livalo    SVT (supraventricular tachycardia) 4/24/2017 6/2017 EPS 1.  Normal baseline intervals. 2.  No evidence of an accessory pathway. 3.  Atrial tachycardia mapped to the posterior aspect near the IVC, status post radiofrequency ablation.       Review of Systems   Constitutional: Negative for fever and chills.   HENT: Negative for sore throat.    Respiratory: Negative for cough.    Gastrointestinal: Negative for nausea and vomiting.   Skin: Positive for dry skin and activity-related sunscreen use. Negative for itching, rash, daily sunscreen  use and recent sunburn.   Hematologic/Lymphatic: Bruises/bleeds easily.        Objective:    Physical Exam   Constitutional: She appears well-developed and well-nourished. No distress.   Neurological: She is alert and oriented to person, place, and time. She is not disoriented.   Psychiatric: She has a normal mood and affect.   Skin:   Areas Examined (abnormalities noted in diagram):   Head / Face Inspection Performed  RUE Inspected  LUE Inspection Performed                   Diagram Legend     Erythematous scaling macule/papule c/w actinic keratosis       Vascular papule c/w angioma      Pigmented verrucoid papule/plaque c/w seborrheic keratosis      Yellow umbilicated papule c/w sebaceous hyperplasia      Irregularly shaped tan macule c/w lentigo     1-2 mm smooth white papules consistent with Milia      Movable subcutaneous cyst with punctum c/w epidermal inclusion cyst      Subcutaneous movable cyst c/w pilar cyst      Firm pink to brown papule c/w dermatofibroma      Pedunculated fleshy papule(s) c/w skin tag(s)      Evenly pigmented macule c/w junctional nevus     Mildly variegated pigmented, slightly irregular-bordered macule c/w mildly atypical nevus      Flesh colored to evenly pigmented papule c/w intradermal nevus       Pink pearly papule/plaque c/w basal cell carcinoma      Erythematous hyperkeratotic cursted plaque c/w SCC      Surgical scar with no sign of skin cancer recurrence      Open and closed comedones      Inflammatory papules and pustules      Verrucoid papule consistent consistent with wart     Erythematous eczematous patches and plaques     Dystrophic onycholytic nail with subungual debris c/w onychomycosis     Umbilicated papule    Erythematous-base heme-crusted tan verrucoid plaque consistent with inflamed seborrheic keratosis     Erythematous Silvery Scaling Plaque c/w Psoriasis     See annotation      Assessment / Plan:        Skin lesion on hand - resolved  Skin lesion on right lower  leg - scar vs SK. No concerning features noted today. Offered shave removal - pt declined. Monitor for any changes in size, shape, color or symptoms    angioma of skin  This is a benign vascular lesion. Reassurance given. No treatment required.       Eczema, unspecified type  - I recommended a mild soap and to avoid anti-bacterial soaps which may be too irritating.   - The patient should also use fragrance-free, color-free laundry detergents and avoid dryer sheets which can be irritating.   - The patients skin should be well-moisturized, using a recommended moisturizer multiple times a day as needed.   - I prescribed TMC 0.025% to be applied twice daily to affected areas for two weeks and then tapered to weekends only.   - Side effects of topical steroids were reviewed with the patient including skin atrophy, striae, telangectasias and tachyphylaxis.                Follow up if symptoms worsen or fail to improve.

## 2020-09-02 ENCOUNTER — HOSPITAL ENCOUNTER (OUTPATIENT)
Dept: RADIOLOGY | Facility: HOSPITAL | Age: 75
Discharge: HOME | End: 2020-09-02
Attending: INTERNAL MEDICINE
Payer: MEDICARE

## 2020-09-02 ENCOUNTER — CLINICAL SUPPORT (OUTPATIENT)
Dept: CARDIOLOGY | Facility: CLINIC | Age: 75
End: 2020-09-02
Attending: INTERNAL MEDICINE
Payer: MEDICARE

## 2020-09-02 VITALS — WEIGHT: 205 LBS | BODY MASS INDEX: 32.95 KG/M2 | HEIGHT: 66 IN

## 2020-09-02 DIAGNOSIS — Z95.5 S/P CORONARY ARTERY STENT PLACEMENT: Chronic | ICD-10-CM

## 2020-09-02 DIAGNOSIS — Z78.9 STATIN INTOLERANCE: ICD-10-CM

## 2020-09-02 DIAGNOSIS — Z95.1 S/P CABG (CORONARY ARTERY BYPASS GRAFT): ICD-10-CM

## 2020-09-02 DIAGNOSIS — I25.10 CORONARY ARTERY DISEASE INVOLVING NATIVE CORONARY ARTERY OF NATIVE HEART WITHOUT ANGINA PECTORIS: Chronic | ICD-10-CM

## 2020-09-02 DIAGNOSIS — E78.5 DYSLIPIDEMIA: ICD-10-CM

## 2020-09-02 PROCEDURE — 78452 HT MUSCLE IMAGE SPECT MULT: CPT | Mod: 26,,, | Performed by: INTERNAL MEDICINE

## 2020-09-02 PROCEDURE — 99999 PR PBB SHADOW E&M-EST. PATIENT-LVL II: CPT | Mod: PBBFAC,,,

## 2020-09-02 PROCEDURE — 93018 PR CARDIAC STRESS TST,INTERP/REPT ONLY: ICD-10-PCS | Mod: ,,, | Performed by: INTERNAL MEDICINE

## 2020-09-02 PROCEDURE — 99999 PR PBB SHADOW E&M-EST. PATIENT-LVL II: ICD-10-PCS | Mod: PBBFAC,,,

## 2020-09-02 PROCEDURE — 93016 STRESS TEST WITH MYOCARDIAL PERFUSION (CUPID ONLY): ICD-10-PCS | Mod: ,,, | Performed by: INTERNAL MEDICINE

## 2020-09-02 PROCEDURE — 93018 CV STRESS TEST I&R ONLY: CPT | Mod: ,,, | Performed by: INTERNAL MEDICINE

## 2020-09-02 PROCEDURE — 93017 CV STRESS TEST TRACING ONLY: CPT

## 2020-09-02 PROCEDURE — 93016 CV STRESS TEST SUPVJ ONLY: CPT | Mod: ,,, | Performed by: INTERNAL MEDICINE

## 2020-09-02 PROCEDURE — 78452 STRESS TEST WITH MYOCARDIAL PERFUSION (CUPID ONLY): ICD-10-PCS | Mod: 26,,, | Performed by: INTERNAL MEDICINE

## 2020-09-04 LAB
CV STRESS BASE HR: 58 BPM
DIASTOLIC BLOOD PRESSURE: 49 MMHG
NUC REST DIASTOLIC VOLUME INDEX: 82
NUC REST EJECTION FRACTION: 69
NUC REST SYSTOLIC VOLUME INDEX: 26
OHS CV CPX 1 MINUTE RECOVERY HEART RATE: 87 BPM
OHS CV CPX 85 PERCENT MAX PREDICTED HEART RATE MALE: 120
OHS CV CPX MAX PREDICTED HEART RATE: 142
OHS CV CPX PATIENT IS FEMALE: 1
OHS CV CPX PATIENT IS MALE: 0
OHS CV CPX PEAK DIASTOLIC BLOOD PRESSURE: 49 MMHG
OHS CV CPX PEAK HEAR RATE: 87 BPM
OHS CV CPX PEAK RATE PRESSURE PRODUCT: NORMAL
OHS CV CPX PEAK SYSTOLIC BLOOD PRESSURE: 147 MMHG
OHS CV CPX PERCENT MAX PREDICTED HEART RATE ACHIEVED: 61
OHS CV CPX RATE PRESSURE PRODUCT PRESENTING: 8526
SUMMED DIFFERENCE: 11
SUMMED REST SCORE: 3
SUMMED STRESS SCORE: 14
SYSTOLIC BLOOD PRESSURE: 147 MMHG

## 2020-09-17 ENCOUNTER — OFFICE VISIT (OUTPATIENT)
Dept: CARDIOLOGY | Facility: CLINIC | Age: 75
End: 2020-09-17
Payer: MEDICARE

## 2020-09-17 VITALS
HEART RATE: 68 BPM | SYSTOLIC BLOOD PRESSURE: 155 MMHG | BODY MASS INDEX: 33.55 KG/M2 | WEIGHT: 208.75 LBS | HEIGHT: 66 IN | DIASTOLIC BLOOD PRESSURE: 68 MMHG

## 2020-09-17 DIAGNOSIS — Z01.818 PREOPERATIVE TESTING: ICD-10-CM

## 2020-09-17 DIAGNOSIS — I25.10 CORONARY ARTERY DISEASE INVOLVING NATIVE CORONARY ARTERY OF NATIVE HEART, ANGINA PRESENCE UNSPECIFIED: Primary | ICD-10-CM

## 2020-09-17 PROCEDURE — 1159F MED LIST DOCD IN RCRD: CPT | Mod: S$GLB,,, | Performed by: PHYSICIAN ASSISTANT

## 2020-09-17 PROCEDURE — 3077F PR MOST RECENT SYSTOLIC BLOOD PRESSURE >= 140 MM HG: ICD-10-PCS | Mod: CPTII,S$GLB,, | Performed by: PHYSICIAN ASSISTANT

## 2020-09-17 PROCEDURE — 99214 OFFICE O/P EST MOD 30 MIN: CPT | Mod: S$GLB,,, | Performed by: PHYSICIAN ASSISTANT

## 2020-09-17 PROCEDURE — 1101F PR PT FALLS ASSESS DOC 0-1 FALLS W/OUT INJ PAST YR: ICD-10-PCS | Mod: CPTII,S$GLB,, | Performed by: PHYSICIAN ASSISTANT

## 2020-09-17 PROCEDURE — 1126F AMNT PAIN NOTED NONE PRSNT: CPT | Mod: S$GLB,,, | Performed by: PHYSICIAN ASSISTANT

## 2020-09-17 PROCEDURE — 1126F PR PAIN SEVERITY QUANTIFIED, NO PAIN PRESENT: ICD-10-PCS | Mod: S$GLB,,, | Performed by: PHYSICIAN ASSISTANT

## 2020-09-17 PROCEDURE — 99999 PR PBB SHADOW E&M-EST. PATIENT-LVL IV: CPT | Mod: PBBFAC,,, | Performed by: PHYSICIAN ASSISTANT

## 2020-09-17 PROCEDURE — 1159F PR MEDICATION LIST DOCUMENTED IN MEDICAL RECORD: ICD-10-PCS | Mod: S$GLB,,, | Performed by: PHYSICIAN ASSISTANT

## 2020-09-17 PROCEDURE — 3078F PR MOST RECENT DIASTOLIC BLOOD PRESSURE < 80 MM HG: ICD-10-PCS | Mod: CPTII,S$GLB,, | Performed by: PHYSICIAN ASSISTANT

## 2020-09-17 PROCEDURE — 3078F DIAST BP <80 MM HG: CPT | Mod: CPTII,S$GLB,, | Performed by: PHYSICIAN ASSISTANT

## 2020-09-17 PROCEDURE — 3077F SYST BP >= 140 MM HG: CPT | Mod: CPTII,S$GLB,, | Performed by: PHYSICIAN ASSISTANT

## 2020-09-17 PROCEDURE — 3008F PR BODY MASS INDEX (BMI) DOCUMENTED: ICD-10-PCS | Mod: CPTII,S$GLB,, | Performed by: PHYSICIAN ASSISTANT

## 2020-09-17 PROCEDURE — 99999 PR PBB SHADOW E&M-EST. PATIENT-LVL IV: ICD-10-PCS | Mod: PBBFAC,,, | Performed by: PHYSICIAN ASSISTANT

## 2020-09-17 PROCEDURE — 3008F BODY MASS INDEX DOCD: CPT | Mod: CPTII,S$GLB,, | Performed by: PHYSICIAN ASSISTANT

## 2020-09-17 PROCEDURE — 1101F PT FALLS ASSESS-DOCD LE1/YR: CPT | Mod: CPTII,S$GLB,, | Performed by: PHYSICIAN ASSISTANT

## 2020-09-17 PROCEDURE — 99214 PR OFFICE/OUTPT VISIT, EST, LEVL IV, 30-39 MIN: ICD-10-PCS | Mod: S$GLB,,, | Performed by: PHYSICIAN ASSISTANT

## 2020-09-17 RX ORDER — SODIUM CHLORIDE 9 MG/ML
INJECTION, SOLUTION INTRAVENOUS CONTINUOUS
Status: CANCELLED | OUTPATIENT
Start: 2020-09-17

## 2020-09-17 NOTE — PATIENT INSTRUCTIONS
Angiogram    Arrive for procedure at: [facility] Friday 10/1/20 @ 8:00 AM.  THE PROCEDURE WILL START AT 10:00 AM WITH DR. VICTORIA.    You will receive a phone call from [facility] Pre-Op Department with further instructions prior to your scheduled procedure.    Notify the nurse if you are ALLERGIC TO IODINE.    FASTING: You MAY NOT have anything to eat or drink AFTER MIDNIGHT the day before your procedure. If your procedure is scheduled in the afternoon, you may have a LIGHT BREAKFAST 6-8 hours prior to your procedure.  For example: Two slices of toast; black coffee or black tea.    MEDICATIONS: You may take your regular morning medications with water. If there are any medications that you should not take, you will be instructed to hold them for that morning.    ? CARDIOLOGY PRE-PROCEDURE MEDICATION ORDERS:  ** Please hold any medications that are checked below:    HOLD   # OF DAYS TO HOLD  ? Coumadin   Consult with Coumadin Clinic   ? Xarelto    _DAY BEFORE & DAY OF_  ? Pradaxa  _ DAY BEFORE & DAY OF _  ? Eliquis   _ DAY BEFORE & DAY OF _  ? Metformin    Day before procedure & morning of procedure  ? Short acting insulin   Morning of procedure    CONTINUE the Following Medications   ? Plavix      ? Effient     ? Aspirin    WHAT TO EXPECT:    How long will the procedure take?  The procedure will take an average of 1 - 2 hours to perform.  After the procedure, you will need to lay flat for around 4 - 6 hours to minimize bleeding from the puncture site. If the wrist is accessed you will need to keep your arm still as instructed by the nurse.    When can I go home?  You may be able to be discharged home that same afternoon if there were no complications.  If you have one of the following: balloon; stent; pacemaker or defibrillator procedures, you may spend one night for observation.  Your doctor will determine your discharge based upon your progress.  The results of your procedure will be discussed with you  before you are discharged.  Any further testing or procedures will be scheduled for you either before you leave or you will be instructed to call for a future appointment.      TRANSPORTATION:  PLEASE ARRANGE TO HAVE SOMEONE DRIVE YOU HOME FOLLOWING YOUR PROCEDURE, YOU WILL NOT BE ALLOWED TO DRIVE.

## 2020-09-17 NOTE — PROGRESS NOTES
"Subjective:    Patient ID:  Mayra Pinedo is a 75 y.o. female who presents for follow-up of CAD.       HPI  Ms. Pinedo is a very pleasant lady who follows with Dr. Thompson. She recently noted worsening SANCHEZ and a nuclear stress test was ordered. It revealed a moderate intensity, small sized, reversible defect that is consistent with ischemia in the mid to apical inferior wall, a moderate sized, moderate intensity, reversible defect that is consistent with ischemia in the mid to apical lateral wall, and a third small sized, mild intensity, reversible defect that is consistent with ischemia in the mid to apical anterior wall.     Review of Systems   Constitution: Negative for chills, diaphoresis, fever, weight gain and weight loss.   HENT: Negative for sore throat.    Eyes: Negative for blurred vision, vision loss in left eye, vision loss in right eye and visual disturbance.   Cardiovascular: Negative for chest pain, claudication, dyspnea on exertion, leg swelling, near-syncope, orthopnea, palpitations, paroxysmal nocturnal dyspnea and syncope.   Respiratory: Negative for cough, hemoptysis, shortness of breath, sputum production and wheezing.    Endocrine: Negative for cold intolerance and heat intolerance.   Hematologic/Lymphatic: Negative for adenopathy. Does not bruise/bleed easily.   Skin: Negative for rash.   Musculoskeletal: Negative for falls, muscle weakness and myalgias.   Gastrointestinal: Negative for abdominal pain, change in bowel habit, constipation, diarrhea, melena and nausea.   Genitourinary: Negative for bladder incontinence.   Neurological: Negative for dizziness, focal weakness, headaches, light-headedness, numbness and weakness.   Psychiatric/Behavioral: Negative for altered mental status.         Vitals:    09/17/20 1326   BP: (!) 155/68   BP Location: Left arm   Patient Position: Sitting   BP Method: Large (Automatic)   Pulse: 68   Weight: 94.7 kg (208 lb 12.4 oz)   Height: 5' 6" (1.676 m) "   Body mass index is 33.7 kg/m².    Objective:    Physical Exam   Constitutional: She is oriented to person, place, and time. She appears well-developed and well-nourished. No distress.   HENT:   Head: Normocephalic and atraumatic.   Mouth/Throat: Oropharynx is clear and moist.   Eyes: Pupils are equal, round, and reactive to light. Conjunctivae and EOM are normal. No scleral icterus.   Neck: Neck supple. No JVD present. No tracheal deviation present.   Cardiovascular: Normal rate and regular rhythm. Exam reveals no gallop and no friction rub.   No murmur heard.  Pulmonary/Chest: Effort normal and breath sounds normal. No respiratory distress. She has no wheezes. She has no rales. She exhibits no tenderness.   Abdominal: Soft. Bowel sounds are normal. She exhibits no distension. There is no hepatosplenomegaly. There is no abdominal tenderness.   Musculoskeletal:         General: No tenderness or edema.   Neurological: She is alert and oriented to person, place, and time.   Skin: Skin is warm and dry. No rash noted. No erythema.   Psychiatric: She has a normal mood and affect. Her behavior is normal.         Assessment:           ICD-10-CM ICD-9-CM   1. S/P coronary artery stent placement  Z95.5 V45.82   2. S/P CABG (coronary artery bypass graft) x 3 1991  Z95.1 V45.81   3. Statin intolerance  Z78.9 995.27   4. Dyslipidemia  E78.5 272.4   5. SVT (supraventricular tachycardia)  I47.1 427.89   6. Essential hypertension  I10 401.9   7. Coronary artery disease involving native coronary artery of native heart without angina pectoris  I25.10 414.01           Problem List Items Addressed This Visit           S/P coronary artery stent placement - Primary (Chronic)      Overview        7/2016 PDA- 2.25 x 24, synergy     8/15 lad/diag promus 2.5x16     8/2009 vg-rca- promus 3.5x8            Coronary artery disease involving native coronary artery of native heart without angina pectoris (Chronic)      Essential hypertension       Dyslipidemia      S/P CABG (coronary artery bypass graft) x 3 1991      Overview        S/P CABG  x 3 1991 LIMA-LAD, VG-D (occluded), VG-RCA            SVT (supraventricular tachycardia)      Overview        6/2017 EPS  1.  Normal baseline intervals.  2.  No evidence of an accessory pathway.  3.  Atrial tachycardia mapped to the posterior aspect near the IVC, status post radiofrequency ablation.            Statin intolerance      Overview        Failed prava/lipitor/crestor. Tolerates livalo              Plan:       Coronary angiogram +/- PCI.   Risks, Benefits, and alternatives of cardiac catheterization and possible intervention were discussed in detail with the patient. Questions were answered. She is agreeable to proceed.  Continue ASA and Plavix.

## 2020-09-22 ENCOUNTER — OFFICE VISIT (OUTPATIENT)
Dept: PAIN MEDICINE | Facility: CLINIC | Age: 75
End: 2020-09-22
Payer: MEDICARE

## 2020-09-22 ENCOUNTER — PATIENT MESSAGE (OUTPATIENT)
Dept: PAIN MEDICINE | Facility: CLINIC | Age: 75
End: 2020-09-22

## 2020-09-22 VITALS
TEMPERATURE: 98 F | WEIGHT: 204.94 LBS | SYSTOLIC BLOOD PRESSURE: 149 MMHG | OXYGEN SATURATION: 99 % | RESPIRATION RATE: 18 BRPM | HEART RATE: 68 BPM | BODY MASS INDEX: 33.07 KG/M2 | DIASTOLIC BLOOD PRESSURE: 67 MMHG

## 2020-09-22 DIAGNOSIS — M48.061 SPINAL STENOSIS OF LUMBAR REGION WITHOUT NEUROGENIC CLAUDICATION: Primary | ICD-10-CM

## 2020-09-22 DIAGNOSIS — I25.10 CORONARY ARTERY DISEASE INVOLVING NATIVE CORONARY ARTERY OF NATIVE HEART WITHOUT ANGINA PECTORIS: Chronic | ICD-10-CM

## 2020-09-22 DIAGNOSIS — M47.816 LUMBAR SPONDYLOSIS: ICD-10-CM

## 2020-09-22 PROCEDURE — 1159F MED LIST DOCD IN RCRD: CPT | Mod: S$GLB,,, | Performed by: ANESTHESIOLOGY

## 2020-09-22 PROCEDURE — 99213 PR OFFICE/OUTPT VISIT, EST, LEVL III, 20-29 MIN: ICD-10-PCS | Mod: S$GLB,,, | Performed by: ANESTHESIOLOGY

## 2020-09-22 PROCEDURE — 3078F DIAST BP <80 MM HG: CPT | Mod: CPTII,S$GLB,, | Performed by: ANESTHESIOLOGY

## 2020-09-22 PROCEDURE — 1159F PR MEDICATION LIST DOCUMENTED IN MEDICAL RECORD: ICD-10-PCS | Mod: S$GLB,,, | Performed by: ANESTHESIOLOGY

## 2020-09-22 PROCEDURE — 1125F AMNT PAIN NOTED PAIN PRSNT: CPT | Mod: S$GLB,,, | Performed by: ANESTHESIOLOGY

## 2020-09-22 PROCEDURE — 3077F PR MOST RECENT SYSTOLIC BLOOD PRESSURE >= 140 MM HG: ICD-10-PCS | Mod: CPTII,S$GLB,, | Performed by: ANESTHESIOLOGY

## 2020-09-22 PROCEDURE — 3008F PR BODY MASS INDEX (BMI) DOCUMENTED: ICD-10-PCS | Mod: CPTII,S$GLB,, | Performed by: ANESTHESIOLOGY

## 2020-09-22 PROCEDURE — 3008F BODY MASS INDEX DOCD: CPT | Mod: CPTII,S$GLB,, | Performed by: ANESTHESIOLOGY

## 2020-09-22 PROCEDURE — 99999 PR PBB SHADOW E&M-EST. PATIENT-LVL V: ICD-10-PCS | Mod: PBBFAC,,, | Performed by: ANESTHESIOLOGY

## 2020-09-22 PROCEDURE — 1101F PT FALLS ASSESS-DOCD LE1/YR: CPT | Mod: CPTII,S$GLB,, | Performed by: ANESTHESIOLOGY

## 2020-09-22 PROCEDURE — 3077F SYST BP >= 140 MM HG: CPT | Mod: CPTII,S$GLB,, | Performed by: ANESTHESIOLOGY

## 2020-09-22 PROCEDURE — 1125F PR PAIN SEVERITY QUANTIFIED, PAIN PRESENT: ICD-10-PCS | Mod: S$GLB,,, | Performed by: ANESTHESIOLOGY

## 2020-09-22 PROCEDURE — 3078F PR MOST RECENT DIASTOLIC BLOOD PRESSURE < 80 MM HG: ICD-10-PCS | Mod: CPTII,S$GLB,, | Performed by: ANESTHESIOLOGY

## 2020-09-22 PROCEDURE — 99999 PR PBB SHADOW E&M-EST. PATIENT-LVL V: CPT | Mod: PBBFAC,,, | Performed by: ANESTHESIOLOGY

## 2020-09-22 PROCEDURE — 99213 OFFICE O/P EST LOW 20 MIN: CPT | Mod: S$GLB,,, | Performed by: ANESTHESIOLOGY

## 2020-09-22 PROCEDURE — 1101F PR PT FALLS ASSESS DOC 0-1 FALLS W/OUT INJ PAST YR: ICD-10-PCS | Mod: CPTII,S$GLB,, | Performed by: ANESTHESIOLOGY

## 2020-09-22 NOTE — PROGRESS NOTES
This note was completed with dictation software and grammatical errors may exist.    CC:low back pain    HPI:  The patient is a 75-year-old woman with history of diabetes, CAD status post CABG and stents, chronic anti-platelet therapy, presents in follow up.  She is status post L3/4 interlaminar epidural steroid injection on 08/27/2020 with What she describes as about 35% improvement for 2 weeks.  She felt that her legs did not feel as heavy during this time, she was able to walk better, stand better, the pain was not constant.  However it has now returned back to the exact same pain that she had previously.  She reports pain across the bilateral low back at the waistline but also heaviness into her bilateral thighs.  She denies any weakness but states that this limits her ability to walk long distances, limits her ability to do activities.  She is currently going to physical therapy and does feel that this has been helping to some degree to help with strength but has not helped much with pain.    On another note, she has been having some fatigue and possibly decreased endurance with activity and she has discussed this with Cardiology, may be related to coronary artery stenosis.  She has an appointment for angiogram this next week.    Pain intervention history:  She has had significant side effects with tramadol which made her dizzy.  She gets hives and reactions to other opioid medication.  She has done physical therapy numerous times, has done aquatic therapy in the past with benefit.  She is s/p L5/S1 TAHIRA on 5/19/20 with about 2 weeks relief.  She is status post bilateral L2, 3, 4, 5 medial branch block on 06/25/2020 with no significant relief.     ROS:  She reports shortness of breath, wheezing, easy bruising, urinary frequency and back pain.  Balance of review of systems is negative.    Past Medical History:   Diagnosis Date    Anticoagulant long-term use     Plavix and ASA therapy    Arthritis     Asthma      seasonal, allergies    Blood in stool     Blood transfusion     Coronary artery disease     stent X3    Coronary artery disease involving native coronary artery of native heart without angina pectoris 6/1/2016    Diabetes mellitus     Dyslipidemia 6/1/2016    GERD (gastroesophageal reflux disease)     Hypertension     S/P CABG (coronary artery bypass graft) x 3 1991 6/1/2016    S/P CABG  x 3 1991 LIMA-LAD, VG-D (occluded), VG-RCA    S/P coronary artery stent placement 6/1/2016 7/2016 PDA- 2.25 x 24, synergy  8/15 lad/diag promus 2.5x16  8/2009 vg-rca- promus 3.5x8    Statin intolerance 11/30/2017    Failed prava/lipitor/crestor. Tolerates livalo    SVT (supraventricular tachycardia) 4/24/2017 6/2017 EPS 1.  Normal baseline intervals. 2.  No evidence of an accessory pathway. 3.  Atrial tachycardia mapped to the posterior aspect near the IVC, status post radiofrequency ablation.       Past Surgical History:   Procedure Laterality Date    ACHILLES TENDON SURGERY Left     torn tendon    CARDIAC SURGERY      x 4--22 years ago    cataract surgery      CHOLECYSTECTOMY      cholescystectomy      COLONOSCOPY  7/20/2006  [facility]    One 1 to 2 mm polyp in the proximal descending colon.  HYPERPLASTIC POLYP.    Internal hemorrhoids.    CORONARY STENT PLACEMENT      2009 X 1; 2014 X 1    EPIDURAL STEROID INJECTION INTO LUMBAR SPINE N/A 5/19/2020    Procedure: Injection-steroid-epidural-lumbar L5/S1;  Surgeon: Mannie Montoya MD;  Location: [facility] OR;  Service: Pain Management;  Laterality: N/A;    EPIDURAL STEROID INJECTION INTO LUMBAR SPINE N/A 8/27/2020    Procedure: Injection-steroid-epidural-lumbar, L3/4;  Surgeon: Mannie Montoya MD;  Location: [facility] OR;  Service: Pain Management;  Laterality: N/A;    HYSTERECTOMY      INJECTION OF ANESTHETIC AGENT AROUND MEDIAL BRANCH NERVES INNERVATING LUMBAR FACET JOINT Bilateral 6/25/2020    Procedure: Block-nerve-medial branch-lumbar L3/4, L4/5, L5/S1;   Surgeon: Mannie Montoya MD;  Location: [facility] OR;  Service: Pain Management;  Laterality: Bilateral;       Social History     Socioeconomic History    Marital status:      Spouse name: Not on file    Number of children: Not on file    Years of education: Not on file    Highest education level: Not on file   Occupational History    Not on file   Social Needs    Financial resource strain: Not on file    Food insecurity     Worry: Not on file     Inability: Not on file    Transportation needs     Medical: Not on file     Non-medical: Not on file   Tobacco Use    Smoking status: Never Smoker    Smokeless tobacco: Never Used   Substance and Sexual Activity    Alcohol use: Yes     Comment: 3 glasses wine daily    Drug use: No    Sexual activity: Yes     Partners: Male     Birth control/protection: Surgical     Comment: hysterectomy    Lifestyle    Physical activity     Days per week: Not on file     Minutes per session: Not on file    Stress: Not on file   Relationships    Social connections     Talks on phone: Not on file     Gets together: Not on file     Attends [employer] service: Not on file     Active member of club or organization: Not on file     Attends meetings of clubs or organizations: Not on file     Relationship status: Not on file   Other Topics Concern    Are you pregnant or think you may be? Not Asked    Breast-feeding Not Asked   Social History Narrative    Not on file         Medications/Allergies: See med card    Vitals:    09/22/20 0939   BP: (!) 149/67   Pulse: 68   Resp: 18   Temp: 98 °F (36.7 °C)   TempSrc: Temporal   SpO2: 99%   Weight: 92.9 kg (204 lb 14.7 oz)   PainSc:   4   PainLoc: Back     Body mass index is 33.07 kg/m².      Physical exam:  Gen: A and O x3, pleasant, well-groomed, significant central adiposity   Skin: No rashes or obvious lesions  HEENT: PERRLA, no obvious deformities on ears or in canals. Trachea midline.  CVS: Regular rate and rhythm, normal  palpable pulses.  Resp:No increased work of breathing, symmetrical chest rise.  Abdomen: Soft, NT/ND.  Musculoskeletal: Able to heel walk, toe walk. No antalgic gait.     Neuro:  Lower extremities: 5/5 strength bilaterally  Reflexes: Patellar 2+, Achilles 1+ bilaterally.  Sensory:  Intact and symmetrical to light touch and pinprick in L2-S1 dermatomes bilaterally.    Lumbar spine:  Lumbar spine:  Range of motion was mildly reduced with forward flexion with no increased pain, moderately reduced with extension especially with oblique extension to either side, radiates into right lateral hip with extension.  Miah's test causes no increased pain on either side.    Supine straight leg raise is negative bilaterally.    Internal and external rotation of the hip causes no increased pain on either side.  Myofascial exam: No tenderness to palpation across lumbar paraspinous muscles.    Neuro:  Upper extremities: 5/5 strength bilaterally   Reflexes: Brachioradialis 1+, Bicep 0+, Tricep 0+.   Sensory: Intact and symmetrical to light touch and pinprick in C2-T1 dermatomes bilaterally.    Cervical Spine:  Cervical spine:  Range of motion is moderately decreased with lateral rotation to either side and with extension with increased pain on each of these maneuvers, mildly decreased with forward flexion with no increased pain.  Spurling's maneuver causes neck pain to either side.  Myofascial exam:  There is some tenderness to palpation over the cervical paraspinous and trapezius muscles bilaterally.  No tenderness into the rhomboid muscles.    Imaging:  Lumbar spine MRI  T12-L1: Central posterior disc osteophyte complex.  No spinal canal or neural foraminal stenosis.  L1-L2: Minimal diffuse disc bulge.  Mild bilateral facet arthropathy.  There is no neuroforaminal stenosis.  There is no spinal canal stenosis.  L2-L3: Mild disc space narrowing.  Diffuse disc bulge with posterior osteophytic ridging and superimposed small central  disc extrusion.  Mild bilateral facet arthropathy.  Mild bilateral neural foraminal stenosis.  There is no spinal canal stenosis.  L3-L4: Mild-to-moderate disc space narrowing.  Diffuse disc bulge with posterior osteophytic ridging and superimposed small central disc extrusion.  Mild bilateral facet arthropathy.  Ligamentum flavum infolding.  Mild bilateral neural foraminal stenosis, right greater than left.  Mild-to-moderate spinal canal stenosis.  L4-L5: Mild-to-moderate disc space narrowing.  Diffuse disc bulge with posterior osteophytic ridging and superimposed central disc extrusion through an annular fissure, which contributes to lateral recess stenosis bilaterally.  Moderate bilateral facet arthropathy.  Ligamentum flavum infolding.  Mild bilateral neural foraminal stenosis, right greater than left.  Mild spinal canal stenosis.  L5-S1: Mild-to-moderate disc space narrowing.  Diffuse disc bulge with superimposed right subarticular to extraforaminal disc extrusion, which contributes to severe right neural foraminal stenosis and narrowing of the right lateral recess with abutment of the descending right S1 nerve root.  Moderate right and mild left facet arthropathy.  Ligamentum flavum infolding.  Severe right and moderate left neural foraminal stenosis.  There is no spinal canal stenosis.     Assessment:  The patient is a 75-year-old woman with history of diabetes, CAD status post CABG and stents, chronic anti-platelet therapy, presents in self-referral for back and neck pain.    1. Spinal stenosis of lumbar region without neurogenic claudication  Ambulatory referral/consult to [facility]   2. Lumbar spondylosis  Ambulatory referral/consult to [facility]   3. Coronary artery disease involving native coronary artery of native heart without angina pectoris           Plan:  1.  Unfortunately she has not had relief of her symptoms with physical therapy, interventional procedures.  While she may not  be a great candidate to undergo surgery in light of her coronary artery disease, I am going to have her see Neurosurgery for an opinion.  She will be having an angiogram this next week and if she undergoes any stenting, it will be a year before she can likely hold any anti-platelet therapy before further interventions.  She will follow up with me in 2 months or sooner as needed.

## 2020-09-28 ENCOUNTER — LAB VISIT (OUTPATIENT)
Dept: FAMILY MEDICINE | Facility: CLINIC | Age: 75
End: 2020-09-28
Payer: MEDICARE

## 2020-09-28 DIAGNOSIS — Z03.818 ENCNTR FOR OBS FOR SUSP EXPSR TO OTH BIOLG AGENTS RULED OUT: ICD-10-CM

## 2020-09-28 PROCEDURE — U0003 INFECTIOUS AGENT DETECTION BY NUCLEIC ACID (DNA OR RNA); SEVERE ACUTE RESPIRATORY SYNDROME CORONAVIRUS 2 (SARS-COV-2) (CORONAVIRUS DISEASE [COVID-19]), AMPLIFIED PROBE TECHNIQUE, MAKING USE OF HIGH THROUGHPUT TECHNOLOGIES AS DESCRIBED BY CMS-2020-01-R: HCPCS

## 2020-09-29 LAB — SARS-COV-2 RNA RESP QL NAA+PROBE: NOT DETECTED

## 2020-09-30 ENCOUNTER — OFFICE VISIT (OUTPATIENT)
Dept: NEUROSURGERY | Facility: CLINIC | Age: 75
End: 2020-09-30
Payer: MEDICARE

## 2020-09-30 VITALS
WEIGHT: 205 LBS | BODY MASS INDEX: 32.95 KG/M2 | HEART RATE: 69 BPM | SYSTOLIC BLOOD PRESSURE: 132 MMHG | HEIGHT: 66 IN | DIASTOLIC BLOOD PRESSURE: 65 MMHG

## 2020-09-30 DIAGNOSIS — M48.061 SPINAL STENOSIS OF LUMBAR REGION WITHOUT NEUROGENIC CLAUDICATION: ICD-10-CM

## 2020-09-30 DIAGNOSIS — M47.816 LUMBAR SPONDYLOSIS: ICD-10-CM

## 2020-09-30 PROCEDURE — 99204 PR OFFICE/OUTPT VISIT, NEW, LEVL IV, 45-59 MIN: ICD-10-PCS | Mod: S$GLB,,, | Performed by: STUDENT IN AN ORGANIZED HEALTH CARE EDUCATION/TRAINING PROGRAM

## 2020-09-30 PROCEDURE — 1101F PT FALLS ASSESS-DOCD LE1/YR: CPT | Mod: CPTII,S$GLB,, | Performed by: STUDENT IN AN ORGANIZED HEALTH CARE EDUCATION/TRAINING PROGRAM

## 2020-09-30 PROCEDURE — 3075F SYST BP GE 130 - 139MM HG: CPT | Mod: CPTII,S$GLB,, | Performed by: STUDENT IN AN ORGANIZED HEALTH CARE EDUCATION/TRAINING PROGRAM

## 2020-09-30 PROCEDURE — 1125F AMNT PAIN NOTED PAIN PRSNT: CPT | Mod: S$GLB,,, | Performed by: STUDENT IN AN ORGANIZED HEALTH CARE EDUCATION/TRAINING PROGRAM

## 2020-09-30 PROCEDURE — 1125F PR PAIN SEVERITY QUANTIFIED, PAIN PRESENT: ICD-10-PCS | Mod: S$GLB,,, | Performed by: STUDENT IN AN ORGANIZED HEALTH CARE EDUCATION/TRAINING PROGRAM

## 2020-09-30 PROCEDURE — 3008F BODY MASS INDEX DOCD: CPT | Mod: CPTII,S$GLB,, | Performed by: STUDENT IN AN ORGANIZED HEALTH CARE EDUCATION/TRAINING PROGRAM

## 2020-09-30 PROCEDURE — 3078F DIAST BP <80 MM HG: CPT | Mod: CPTII,S$GLB,, | Performed by: STUDENT IN AN ORGANIZED HEALTH CARE EDUCATION/TRAINING PROGRAM

## 2020-09-30 PROCEDURE — 1159F PR MEDICATION LIST DOCUMENTED IN MEDICAL RECORD: ICD-10-PCS | Mod: S$GLB,,, | Performed by: STUDENT IN AN ORGANIZED HEALTH CARE EDUCATION/TRAINING PROGRAM

## 2020-09-30 PROCEDURE — 3075F PR MOST RECENT SYSTOLIC BLOOD PRESS GE 130-139MM HG: ICD-10-PCS | Mod: CPTII,S$GLB,, | Performed by: STUDENT IN AN ORGANIZED HEALTH CARE EDUCATION/TRAINING PROGRAM

## 2020-09-30 PROCEDURE — 3078F PR MOST RECENT DIASTOLIC BLOOD PRESSURE < 80 MM HG: ICD-10-PCS | Mod: CPTII,S$GLB,, | Performed by: STUDENT IN AN ORGANIZED HEALTH CARE EDUCATION/TRAINING PROGRAM

## 2020-09-30 PROCEDURE — 1101F PR PT FALLS ASSESS DOC 0-1 FALLS W/OUT INJ PAST YR: ICD-10-PCS | Mod: CPTII,S$GLB,, | Performed by: STUDENT IN AN ORGANIZED HEALTH CARE EDUCATION/TRAINING PROGRAM

## 2020-09-30 PROCEDURE — 99999 PR PBB SHADOW E&M-EST. PATIENT-LVL IV: ICD-10-PCS | Mod: PBBFAC,,, | Performed by: STUDENT IN AN ORGANIZED HEALTH CARE EDUCATION/TRAINING PROGRAM

## 2020-09-30 PROCEDURE — 99999 PR PBB SHADOW E&M-EST. PATIENT-LVL IV: CPT | Mod: PBBFAC,,, | Performed by: STUDENT IN AN ORGANIZED HEALTH CARE EDUCATION/TRAINING PROGRAM

## 2020-09-30 PROCEDURE — 99204 OFFICE O/P NEW MOD 45 MIN: CPT | Mod: S$GLB,,, | Performed by: STUDENT IN AN ORGANIZED HEALTH CARE EDUCATION/TRAINING PROGRAM

## 2020-09-30 PROCEDURE — 1159F MED LIST DOCD IN RCRD: CPT | Mod: S$GLB,,, | Performed by: STUDENT IN AN ORGANIZED HEALTH CARE EDUCATION/TRAINING PROGRAM

## 2020-09-30 PROCEDURE — 3008F PR BODY MASS INDEX (BMI) DOCUMENTED: ICD-10-PCS | Mod: CPTII,S$GLB,, | Performed by: STUDENT IN AN ORGANIZED HEALTH CARE EDUCATION/TRAINING PROGRAM

## 2020-09-30 NOTE — LETTER
September 30, 2020      Mannie Montoya MD  [address] [facility] [address] [place] [address]           [facility]  [address] [place] [address]  Phone: [phone]  Fax: [phone]          Patient: Mayra Pinedo   MR Number: [id number]   YOB: 1945   Date of Visit: 9/30/2020       Dear Dr. Mannie Montoya:    Thank you for referring Mayra Pinedo to me for evaluation. Attached you will find relevant portions of my assessment and plan of care.    If you have questions, please do not hesitate to call me. I look forward to following Mayra Pinedo along with you.    Sincerely,    Rodrigue Gomez MD    Enclosure  CC:  No Recipients    If you would like to receive this communication electronically, please contact [email or web address] or [phone] to request more information on [facility] Dragon Link access.    For providers and/or their staff who would like to refer a patient to [facility], please contact us through our one-stop-shop provider referral line, [facility], at [phone].    If you feel you have received this communication in error or would no longer like to receive these types of communications, please e-mail [email or web address]

## 2020-09-30 NOTE — PROGRESS NOTES
[facility] Neurosurgery  Clinic Consult     Consult Requested By: Mannie Montoya MD  PCP: Robert Tucker MD    SUBJECTIVE:     Chief Complaint:   Chief Complaint   Patient presents with    Lumbar Spine Pain (L-Spine)     Patient reports low back pain that started over 40 years ago; patient reports TAHIRA and nerve block did not help; pain 3/10       History of Present Illness:  Mayra Pinedo is a 75 y.o. female with HTN, DM, and CAD s/p CABG and stenting on Plavix who presents for evaluation of low back pain. Patient reports she has experienced back pain for her entire life. It is present in the low back, L>R and radiates into the lateral hips. She denies radicular pain in the lower extremities. Denies numbness, tingling, weakness. Denies bowel/bladder dysfunction. She reports occasionally she will experience a sensation of her leg about to give out when standing from the seated position. She states the back pain is not present every day. On her bad days, she will take tylenol and rest in her recliner. She is scheduled for a cardiac cath tomorrow and understands she will not be a candidate for treatment for her back pain for at least 6 months. She is s/p TAHIRA and MBB without significant relief.     Pertinent and recent history, provider evaluations, imaging and data reviewed in EPIC        Past Medical History:   Diagnosis Date    Anticoagulant long-term use     Plavix and ASA therapy    Arthritis     Asthma     seasonal, allergies    Blood in stool     Blood transfusion     Coronary artery disease     stent X3    Coronary artery disease involving native coronary artery of native heart without angina pectoris 6/1/2016    Diabetes mellitus     Dyslipidemia 6/1/2016    GERD (gastroesophageal reflux disease)     Hypertension     S/P CABG (coronary artery bypass graft) x 3 1991 6/1/2016    S/P CABG  x 3 1991 LIMA-LAD, VG-D (occluded), VG-RCA    S/P coronary artery stent placement 6/1/2016     7/2016 PDA- 2.25 x 24, synergy  8/15 lad/diag promus 2.5x16  8/2009 vg-rca- promus 3.5x8    Statin intolerance 11/30/2017    Failed prava/lipitor/crestor. Tolerates livalo    SVT (supraventricular tachycardia) 4/24/2017 6/2017 EPS 1.  Normal baseline intervals. 2.  No evidence of an accessory pathway. 3.  Atrial tachycardia mapped to the posterior aspect near the IVC, status post radiofrequency ablation.     Past Surgical History:   Procedure Laterality Date    ACHILLES TENDON SURGERY Left     torn tendon    CARDIAC SURGERY      x 4--22 years ago    cataract surgery      CHOLECYSTECTOMY      cholescystectomy      COLONOSCOPY  7/20/2006  [facility]    One 1 to 2 mm polyp in the proximal descending colon.  HYPERPLASTIC POLYP.    Internal hemorrhoids.    CORONARY STENT PLACEMENT      2009 X 1; 2014 X 1    EPIDURAL STEROID INJECTION INTO LUMBAR SPINE N/A 5/19/2020    Procedure: Injection-steroid-epidural-lumbar L5/S1;  Surgeon: Mannie Montoya MD;  Location: [facility] OR;  Service: Pain Management;  Laterality: N/A;    EPIDURAL STEROID INJECTION INTO LUMBAR SPINE N/A 8/27/2020    Procedure: Injection-steroid-epidural-lumbar, L3/4;  Surgeon: Mannie Montoya MD;  Location: [facility] OR;  Service: Pain Management;  Laterality: N/A;    HYSTERECTOMY      INJECTION OF ANESTHETIC AGENT AROUND MEDIAL BRANCH NERVES INNERVATING LUMBAR FACET JOINT Bilateral 6/25/2020    Procedure: Block-nerve-medial branch-lumbar L3/4, L4/5, L5/S1;  Surgeon: Mannie Montoya MD;  Location: [facility] OR;  Service: Pain Management;  Laterality: Bilateral;     Family History   Problem Relation Age of Onset    Hypertension Sister     Heart disease Brother     Hypertension Brother     Coronary artery disease Brother     Obesity Brother     Hypertension Maternal Grandmother     Heart disease Maternal Grandmother     Heart attack Maternal Grandmother     Hypertension Maternal Grandfather     Heart disease Maternal Grandfather     Breast  cancer Neg Hx     Ovarian cancer Neg Hx      Social History     Tobacco Use    Smoking status: Never Smoker    Smokeless tobacco: Never Used   Substance Use Topics    Alcohol use: Yes    Drug use: No      Review of patient's allergies indicates:   Allergen Reactions    Oxycodone-acetaminophen Hives    Digitalis glycosides Other (See Comments)     Severe joint pain    Digoxin Other (See Comments)     Numbness and painful to move    Statins-hmg-coa reductase inhibitors      Myalgias, alpoecia    Erythromycin Other (See Comments)     Pt reports tachycardia and andres skin to neck and face    Latex, natural rubber Nausea Only and Rash     Nausea at dentist    Penicillins Rash    Sulfa (sulfonamide antibiotics) Rash       Current Outpatient Medications:     ALBUTEROL SULFATE (VENTOLIN HFA INHL), Inhale 2 Inhalers into the lungs as needed. , Disp: , Rfl:     aspirin (ECOTRIN) 81 MG EC tablet, Take 81 mg by mouth once daily.  , Disp: , Rfl:     carvediloL (COREG) 25 MG tablet, Take 1 tablet by mouth twice daily, Disp: 180 tablet, Rfl: 4    cetirizine (ZYRTEC) 10 MG tablet, Take 1 tablet by mouth every evening. , Disp: , Rfl:     clopidogreL (PLAVIX) 75 mg tablet, TAKE 1 TABLET BY MOUTH ONCE DAILY, Disp: 90 tablet, Rfl: 4    conjugated estrogens (PREMARIN) vaginal cream, Place 1 g vaginally once daily., Disp: 42.5 g, Rfl: 1    diclofenac sodium 1 % Gel, 4 g., Disp: , Rfl:     ezetimibe (ZETIA) 10 mg tablet, Take 1 tablet (10 mg total) by mouth once daily., Disp: 90 tablet, Rfl: 3    fenofibrate (TRICOR) 54 MG tablet, Take 1 tablet (54 mg total) by mouth once daily., Disp: 90 tablet, Rfl: 4    isosorbide mononitrate (IMDUR) 60 MG 24 hr tablet, Take 1 tablet by mouth in the evening, Disp: 90 tablet, Rfl: 4    losartan (COZAAR) 25 MG tablet, Take 1 tablet (25 mg total) by mouth every morning., Disp: 90 tablet, Rfl: 6    metFORMIN (GLUCOPHAGE) 500 MG tablet, Take 500 mg by mouth once daily., Disp: , Rfl:  "    nitroGLYCERIN (NITROSTAT) 0.4 MG SL tablet, Place 0.4 mg under the tongue., Disp: , Rfl:     omega-3 fatty acids-vitamin E (FISH OIL) 1,000 mg Cap, Take 2,400 mg by mouth 2 (two) times daily. , Disp: , Rfl:     pantoprazole (PROTONIX) 40 MG tablet, Take 40 mg by mouth once daily., Disp: , Rfl:     pitavastatin calcium (LIVALO) 2 mg Tab tablet, Take 1 tablet (2 mg total) by mouth every evening. (Patient taking differently: Take 2 mg by mouth once daily. ), Disp: 90 tablet, Rfl: 3    triamcinolone acetonide 0.025% (KENALOG) 0.025 % cream, Apply topically 2 (two) times daily. Apply for 2 weeks, then take a break for 2 weeks. Repeat as needed., Disp: 15 g, Rfl: 1    Review of Systems:   Constitutional: no fever, chills or night sweats. No changes in weight   Eyes: no visual changes   ENT: no nasal congestion or sore throat   Respiratory: no cough or shortness of breath   Cardiovascular: no chest pain or palpitations   Gastrointestinal: no nausea or vomiting   Genitourinary: no hematuria or dysuria   Integument/Breast: no rash or pruritis   Hematologic/Lymphatic: no easy bruising or lymphadenopathy   Musculoskeletal: +back pain   Neurological: no seizures or tremors   Behavioral/Psych: no auditory or visual hallucinations   Endocrine: no heat or cold intolerance         OBJECTIVE:     Vital Signs (Most Recent):  Pulse: 69 (09/30/20 1350)  BP: 132/65 (09/30/20 1350)  Estimated body mass index is 33.09 kg/m² as calculated from the following:    Height as of this encounter: 5' 6" (1.676 m).    Weight as of this encounter: 93 kg (205 lb 0.4 oz).    Physical Exam:   General: well developed, well nourished, no distress.   Neurologic: Alert and oriented. Thought content appropriate. GCS 15.   Language: No aphasia  Speech: No dysarthria  Head: normocephalic, atraumatic  Eyes: EOMI.  Neck: trachea midline, no JVD   Cardiovascular: no LE edema  Pulmonary: normal respirations, no signs of respiratory distress  Abdomen: " non-distended  Sensory: intact to light touch throughout  Skin: Skin is warm, dry and intact.    Motor Strength: Moves all extremities spontaneously with good tone. No abnormal movements seen.     Strength  Deltoids Triceps Biceps Wrist Extension Wrist Flexion Hand  Interossei   Upper: R 5/5 5/5 5/5 5/5 5/5 5/5 5/5    L 5/5 5/5 5/5 5/5 5/5 5/5 5/5     Iliopsoas Quadriceps Knee  Flexion Tibialis  anterior Gastro- cnemius EHL    Lower: R 5/5 5/5 5/5 5/5 5/5 5/5     L 5/5 5/5 5/5 5/5 5/5 5/5      DTR's: 1+  Martini: absent  Clonus: absent  Gait: normal    Tandem Gait: No difficulty           Able to stand on heels & toes   Lumbar Spine: full ROM, increased pain with extension        Diagnostic Results:  I have independently reviewed the following imaging:  CT: Reviewed  MRI     Lumbar spondylosis L2-S1  Disc/ osteopthyes with variable degerees of lateral recess, foraminal stenosis  Left L5-S1 foraminal stenosis mutlifactorial  No deformity, no central neural stenosis    ASSESSMENT/PLAN:     Spinal stenosis of lumbar region without neurogenic claudication  -     Ambulatory referral/consult to [facility]    Lumbar spondylosis  -     Ambulatory referral/consult to [facility]        Mayra Pinedo is a 75 y.o. female  With nonspecific back pain, no neurogenic claudication radiculopathy  She has good days and bad days flare up primary lower back pain  She has not had improvement to reach her goals with physical therapy or injection  Her MRI was reviewed she is socially sews lumbar spondylosis with degeneration from L2-S1.  She has no concerning central stenosis variable degrees of lateral recess and foraminal stenosis  Discussed treatment options  Do not think that she will achieve her goals with surgery, there is no focal targeted pain generator.  she has been reassured in counseled on nonoperative treatment options    We discussed non-surgical treatment options.  We discussed medical management  and referral options including anti-inflammatories, muscle relaxants, narcotics and neuropathic pain medications.  We discussed physical therapy for back strengthening and flexibility.  We talked about injection therapy for both treatment and diagnosis.        Patient verbalized understanding of plan. Encouraged to call with any questions or concerns.     This note was partially dictated using voice recognition software, so please excuse any errors that were not corrected.

## 2020-10-01 PROBLEM — I25.10 CORONARY ARTERY DISEASE INVOLVING NATIVE CORONARY ARTERY OF NATIVE HEART: Status: ACTIVE | Noted: 2020-10-01

## 2020-10-28 ENCOUNTER — PATIENT MESSAGE (OUTPATIENT)
Dept: CARDIOLOGY | Facility: CLINIC | Age: 75
End: 2020-10-28

## 2020-11-16 RX ORDER — FENOFIBRATE 54 MG/1
TABLET ORAL
Qty: 90 TABLET | Refills: 4 | Status: SHIPPED | OUTPATIENT
Start: 2020-11-16 | End: 2021-01-05

## 2020-11-19 ENCOUNTER — PATIENT MESSAGE (OUTPATIENT)
Dept: PAIN MEDICINE | Facility: CLINIC | Age: 75
End: 2020-11-19

## 2020-12-29 ENCOUNTER — PATIENT MESSAGE (OUTPATIENT)
Dept: CARDIOLOGY | Facility: CLINIC | Age: 75
End: 2020-12-29

## 2020-12-29 ENCOUNTER — LAB VISIT (OUTPATIENT)
Dept: LAB | Facility: HOSPITAL | Age: 75
End: 2020-12-29
Attending: INTERNAL MEDICINE
Payer: MEDICARE

## 2020-12-29 ENCOUNTER — TELEPHONE (OUTPATIENT)
Dept: CARDIOLOGY | Facility: CLINIC | Age: 75
End: 2020-12-29

## 2020-12-29 DIAGNOSIS — E78.5 DYSLIPIDEMIA: ICD-10-CM

## 2020-12-29 DIAGNOSIS — Z78.9 STATIN INTOLERANCE: ICD-10-CM

## 2020-12-29 DIAGNOSIS — Z95.1 S/P CABG (CORONARY ARTERY BYPASS GRAFT): ICD-10-CM

## 2020-12-29 DIAGNOSIS — Z95.5 S/P CORONARY ARTERY STENT PLACEMENT: Chronic | ICD-10-CM

## 2020-12-29 DIAGNOSIS — I25.10 CORONARY ARTERY DISEASE INVOLVING NATIVE CORONARY ARTERY OF NATIVE HEART WITHOUT ANGINA PECTORIS: Chronic | ICD-10-CM

## 2020-12-29 LAB
ALBUMIN SERPL BCP-MCNC: 3.7 G/DL (ref 3.5–5.2)
ALP SERPL-CCNC: 50 U/L (ref 55–135)
ALT SERPL W/O P-5'-P-CCNC: 18 U/L (ref 10–44)
ANION GAP SERPL CALC-SCNC: 9 MMOL/L (ref 8–16)
AST SERPL-CCNC: 18 U/L (ref 10–40)
BILIRUB SERPL-MCNC: 0.4 MG/DL (ref 0.1–1)
BUN SERPL-MCNC: 13 MG/DL (ref 8–23)
CALCIUM SERPL-MCNC: 9.3 MG/DL (ref 8.7–10.5)
CHLORIDE SERPL-SCNC: 105 MMOL/L (ref 95–110)
CHOLEST SERPL-MCNC: 93 MG/DL (ref 120–199)
CHOLEST/HDLC SERPL: 3.4 {RATIO} (ref 2–5)
CK SERPL-CCNC: 52 U/L (ref 20–180)
CO2 SERPL-SCNC: 25 MMOL/L (ref 23–29)
CREAT SERPL-MCNC: 1 MG/DL (ref 0.5–1.4)
EST. GFR  (AFRICAN AMERICAN): >60 ML/MIN/1.73 M^2
EST. GFR  (NON AFRICAN AMERICAN): 56 ML/MIN/1.73 M^2
GLUCOSE SERPL-MCNC: 132 MG/DL (ref 70–110)
HDLC SERPL-MCNC: 27 MG/DL (ref 40–75)
HDLC SERPL: 29 % (ref 20–50)
LDLC SERPL CALC-MCNC: 21 MG/DL (ref 63–159)
NONHDLC SERPL-MCNC: 66 MG/DL
POTASSIUM SERPL-SCNC: 4.3 MMOL/L (ref 3.5–5.1)
PROT SERPL-MCNC: 7.3 G/DL (ref 6–8.4)
SODIUM SERPL-SCNC: 139 MMOL/L (ref 136–145)
TRIGL SERPL-MCNC: 225 MG/DL (ref 30–150)

## 2020-12-29 PROCEDURE — 80061 LIPID PANEL: CPT

## 2020-12-29 PROCEDURE — 36415 COLL VENOUS BLD VENIPUNCTURE: CPT | Mod: PO

## 2020-12-29 PROCEDURE — 82550 ASSAY OF CK (CPK): CPT

## 2020-12-29 PROCEDURE — 80053 COMPREHEN METABOLIC PANEL: CPT

## 2020-12-29 NOTE — TELEPHONE ENCOUNTER
----- Message from Laxmi Herr sent at 12/29/2020  9:16 AM CST -----  Contact: pt  Type: Needs Medical Advice    Who Called:  pt  Best Call Back Number: [phone]    Additional Information: Requesting a call back regarding an order for an a1c test  Please Advise ---Thank you

## 2021-01-05 ENCOUNTER — OFFICE VISIT (OUTPATIENT)
Dept: CARDIOLOGY | Facility: CLINIC | Age: 76
End: 2021-01-05
Payer: MEDICARE

## 2021-01-05 ENCOUNTER — LAB VISIT (OUTPATIENT)
Dept: LAB | Facility: HOSPITAL | Age: 76
End: 2021-01-05
Attending: INTERNAL MEDICINE
Payer: MEDICARE

## 2021-01-05 VITALS
BODY MASS INDEX: 32.59 KG/M2 | HEART RATE: 67 BPM | SYSTOLIC BLOOD PRESSURE: 118 MMHG | HEIGHT: 66 IN | WEIGHT: 202.81 LBS | DIASTOLIC BLOOD PRESSURE: 64 MMHG

## 2021-01-05 DIAGNOSIS — I25.10 CORONARY ARTERY DISEASE INVOLVING NATIVE CORONARY ARTERY OF NATIVE HEART WITHOUT ANGINA PECTORIS: ICD-10-CM

## 2021-01-05 DIAGNOSIS — I10 ESSENTIAL HYPERTENSION: ICD-10-CM

## 2021-01-05 DIAGNOSIS — E78.5 DYSLIPIDEMIA: ICD-10-CM

## 2021-01-05 DIAGNOSIS — Z95.1 S/P CABG (CORONARY ARTERY BYPASS GRAFT): ICD-10-CM

## 2021-01-05 DIAGNOSIS — I47.10 SVT (SUPRAVENTRICULAR TACHYCARDIA): ICD-10-CM

## 2021-01-05 DIAGNOSIS — Z78.9 STATIN INTOLERANCE: ICD-10-CM

## 2021-01-05 DIAGNOSIS — Z95.5 S/P CORONARY ARTERY STENT PLACEMENT: Chronic | ICD-10-CM

## 2021-01-05 DIAGNOSIS — E08.21 DIABETES MELLITUS DUE TO UNDERLYING CONDITION WITH DIABETIC NEPHROPATHY: ICD-10-CM

## 2021-01-05 DIAGNOSIS — Z95.1 S/P CABG (CORONARY ARTERY BYPASS GRAFT): Primary | ICD-10-CM

## 2021-01-05 LAB
ALBUMIN SERPL BCP-MCNC: 3.6 G/DL (ref 3.5–5.2)
ALP SERPL-CCNC: 49 U/L (ref 55–135)
ALT SERPL W/O P-5'-P-CCNC: 17 U/L (ref 10–44)
ANION GAP SERPL CALC-SCNC: 10 MMOL/L (ref 8–16)
AST SERPL-CCNC: 19 U/L (ref 10–40)
BILIRUB SERPL-MCNC: 0.4 MG/DL (ref 0.1–1)
BUN SERPL-MCNC: 11 MG/DL (ref 8–23)
CALCIUM SERPL-MCNC: 9.1 MG/DL (ref 8.7–10.5)
CHLORIDE SERPL-SCNC: 106 MMOL/L (ref 95–110)
CHOLEST SERPL-MCNC: 91 MG/DL (ref 120–199)
CHOLEST/HDLC SERPL: 3 {RATIO} (ref 2–5)
CK SERPL-CCNC: 57 U/L (ref 20–180)
CO2 SERPL-SCNC: 25 MMOL/L (ref 23–29)
CREAT SERPL-MCNC: 0.9 MG/DL (ref 0.5–1.4)
EST. GFR  (AFRICAN AMERICAN): >60 ML/MIN/1.73 M^2
EST. GFR  (NON AFRICAN AMERICAN): >60 ML/MIN/1.73 M^2
GLUCOSE SERPL-MCNC: 121 MG/DL (ref 70–110)
HDLC SERPL-MCNC: 30 MG/DL (ref 40–75)
HDLC SERPL: 33 % (ref 20–50)
LDLC SERPL CALC-MCNC: 17.6 MG/DL (ref 63–159)
NONHDLC SERPL-MCNC: 61 MG/DL
POTASSIUM SERPL-SCNC: 3.9 MMOL/L (ref 3.5–5.1)
PROT SERPL-MCNC: 7 G/DL (ref 6–8.4)
SODIUM SERPL-SCNC: 141 MMOL/L (ref 136–145)
T4 FREE SERPL-MCNC: 0.86 NG/DL (ref 0.71–1.51)
TRIGL SERPL-MCNC: 217 MG/DL (ref 30–150)
TSH SERPL DL<=0.005 MIU/L-ACNC: 3.73 UIU/ML (ref 0.4–4)

## 2021-01-05 PROCEDURE — 99214 PR OFFICE/OUTPT VISIT, EST, LEVL IV, 30-39 MIN: ICD-10-PCS | Mod: S$GLB,,, | Performed by: INTERNAL MEDICINE

## 2021-01-05 PROCEDURE — 80061 LIPID PANEL: CPT

## 2021-01-05 PROCEDURE — 3078F PR MOST RECENT DIASTOLIC BLOOD PRESSURE < 80 MM HG: ICD-10-PCS | Mod: CPTII,S$GLB,, | Performed by: INTERNAL MEDICINE

## 2021-01-05 PROCEDURE — 80053 COMPREHEN METABOLIC PANEL: CPT

## 2021-01-05 PROCEDURE — 1159F PR MEDICATION LIST DOCUMENTED IN MEDICAL RECORD: ICD-10-PCS | Mod: S$GLB,,, | Performed by: INTERNAL MEDICINE

## 2021-01-05 PROCEDURE — 3074F SYST BP LT 130 MM HG: CPT | Mod: CPTII,S$GLB,, | Performed by: INTERNAL MEDICINE

## 2021-01-05 PROCEDURE — 1101F PT FALLS ASSESS-DOCD LE1/YR: CPT | Mod: CPTII,S$GLB,, | Performed by: INTERNAL MEDICINE

## 2021-01-05 PROCEDURE — 99214 OFFICE O/P EST MOD 30 MIN: CPT | Mod: S$GLB,,, | Performed by: INTERNAL MEDICINE

## 2021-01-05 PROCEDURE — 1126F PR PAIN SEVERITY QUANTIFIED, NO PAIN PRESENT: ICD-10-PCS | Mod: S$GLB,,, | Performed by: INTERNAL MEDICINE

## 2021-01-05 PROCEDURE — 1159F MED LIST DOCD IN RCRD: CPT | Mod: S$GLB,,, | Performed by: INTERNAL MEDICINE

## 2021-01-05 PROCEDURE — 84443 ASSAY THYROID STIM HORMONE: CPT

## 2021-01-05 PROCEDURE — 3078F DIAST BP <80 MM HG: CPT | Mod: CPTII,S$GLB,, | Performed by: INTERNAL MEDICINE

## 2021-01-05 PROCEDURE — 1126F AMNT PAIN NOTED NONE PRSNT: CPT | Mod: S$GLB,,, | Performed by: INTERNAL MEDICINE

## 2021-01-05 PROCEDURE — 99999 PR PBB SHADOW E&M-EST. PATIENT-LVL IV: CPT | Mod: PBBFAC,,, | Performed by: INTERNAL MEDICINE

## 2021-01-05 PROCEDURE — 3074F PR MOST RECENT SYSTOLIC BLOOD PRESSURE < 130 MM HG: ICD-10-PCS | Mod: CPTII,S$GLB,, | Performed by: INTERNAL MEDICINE

## 2021-01-05 PROCEDURE — 3288F PR FALLS RISK ASSESSMENT DOCUMENTED: ICD-10-PCS | Mod: CPTII,S$GLB,, | Performed by: INTERNAL MEDICINE

## 2021-01-05 PROCEDURE — 84439 ASSAY OF FREE THYROXINE: CPT

## 2021-01-05 PROCEDURE — 82550 ASSAY OF CK (CPK): CPT

## 2021-01-05 PROCEDURE — 36415 COLL VENOUS BLD VENIPUNCTURE: CPT | Mod: PO

## 2021-01-05 PROCEDURE — 3008F PR BODY MASS INDEX (BMI) DOCUMENTED: ICD-10-PCS | Mod: CPTII,S$GLB,, | Performed by: INTERNAL MEDICINE

## 2021-01-05 PROCEDURE — 99999 PR PBB SHADOW E&M-EST. PATIENT-LVL IV: ICD-10-PCS | Mod: PBBFAC,,, | Performed by: INTERNAL MEDICINE

## 2021-01-05 PROCEDURE — 83036 HEMOGLOBIN GLYCOSYLATED A1C: CPT

## 2021-01-05 PROCEDURE — 3008F BODY MASS INDEX DOCD: CPT | Mod: CPTII,S$GLB,, | Performed by: INTERNAL MEDICINE

## 2021-01-05 PROCEDURE — 3288F FALL RISK ASSESSMENT DOCD: CPT | Mod: CPTII,S$GLB,, | Performed by: INTERNAL MEDICINE

## 2021-01-05 PROCEDURE — 1101F PR PT FALLS ASSESS DOC 0-1 FALLS W/OUT INJ PAST YR: ICD-10-PCS | Mod: CPTII,S$GLB,, | Performed by: INTERNAL MEDICINE

## 2021-01-05 RX ORDER — PITAVASTATIN CALCIUM 2.09 MG/1
2 TABLET, FILM COATED ORAL NIGHTLY
Qty: 90 TABLET | Refills: 3 | Status: SHIPPED | OUTPATIENT
Start: 2021-01-05 | End: 2021-08-18 | Stop reason: SDUPTHER

## 2021-01-05 RX ORDER — FENOFIBRATE 160 MG/1
160 TABLET ORAL DAILY
Qty: 90 TABLET | Refills: 3 | Status: SHIPPED | OUTPATIENT
Start: 2021-01-05 | End: 2021-08-18 | Stop reason: SDUPTHER

## 2021-01-06 LAB
ESTIMATED AVG GLUCOSE: 123 MG/DL (ref 68–131)
HBA1C MFR BLD HPLC: 5.9 % (ref 4–5.6)

## 2021-01-11 ENCOUNTER — IMMUNIZATION (OUTPATIENT)
Dept: FAMILY MEDICINE | Facility: CLINIC | Age: 76
End: 2021-01-11
Payer: MEDICARE

## 2021-01-11 DIAGNOSIS — Z23 NEED FOR VACCINATION: ICD-10-CM

## 2021-01-11 PROCEDURE — 91300 COVID-19, MRNA, LNP-S, PF, 30 MCG/0.3 ML DOSE VACCINE: CPT | Mod: PBBFAC | Performed by: FAMILY MEDICINE

## 2021-02-01 ENCOUNTER — IMMUNIZATION (OUTPATIENT)
Dept: FAMILY MEDICINE | Facility: CLINIC | Age: 76
End: 2021-02-01
Payer: MEDICARE

## 2021-02-01 DIAGNOSIS — Z23 NEED FOR VACCINATION: Primary | ICD-10-CM

## 2021-02-01 PROCEDURE — 91300 COVID-19, MRNA, LNP-S, PF, 30 MCG/0.3 ML DOSE VACCINE: CPT | Mod: PBBFAC | Performed by: FAMILY MEDICINE

## 2021-02-01 PROCEDURE — 0002A COVID-19, MRNA, LNP-S, PF, 30 MCG/0.3 ML DOSE VACCINE: CPT | Mod: PBBFAC | Performed by: FAMILY MEDICINE

## 2021-02-11 ENCOUNTER — PATIENT MESSAGE (OUTPATIENT)
Dept: PAIN MEDICINE | Facility: CLINIC | Age: 76
End: 2021-02-11

## 2021-03-30 ENCOUNTER — PATIENT MESSAGE (OUTPATIENT)
Dept: CARDIOLOGY | Facility: CLINIC | Age: 76
End: 2021-03-30

## 2021-04-21 ENCOUNTER — TELEPHONE (OUTPATIENT)
Dept: CARDIOLOGY | Facility: CLINIC | Age: 76
End: 2021-04-21

## 2021-04-26 ENCOUNTER — PATIENT MESSAGE (OUTPATIENT)
Dept: CARDIOLOGY | Facility: CLINIC | Age: 76
End: 2021-04-26

## 2021-05-13 ENCOUNTER — PATIENT MESSAGE (OUTPATIENT)
Dept: CARDIOLOGY | Facility: CLINIC | Age: 76
End: 2021-05-13

## 2021-05-14 ENCOUNTER — PATIENT MESSAGE (OUTPATIENT)
Dept: CARDIOLOGY | Facility: CLINIC | Age: 76
End: 2021-05-14

## 2021-06-21 ENCOUNTER — PATIENT MESSAGE (OUTPATIENT)
Dept: CARDIOLOGY | Facility: CLINIC | Age: 76
End: 2021-06-21

## 2021-06-21 DIAGNOSIS — Z95.5 S/P CORONARY ARTERY STENT PLACEMENT: Chronic | ICD-10-CM

## 2021-06-21 DIAGNOSIS — I10 ESSENTIAL HYPERTENSION: ICD-10-CM

## 2021-06-21 DIAGNOSIS — E78.5 DYSLIPIDEMIA: ICD-10-CM

## 2021-06-21 DIAGNOSIS — I25.10 CORONARY ARTERY DISEASE INVOLVING NATIVE CORONARY ARTERY OF NATIVE HEART WITHOUT ANGINA PECTORIS: Chronic | ICD-10-CM

## 2021-06-22 ENCOUNTER — PATIENT MESSAGE (OUTPATIENT)
Dept: CARDIOLOGY | Facility: CLINIC | Age: 76
End: 2021-06-22

## 2021-06-22 RX ORDER — LOSARTAN POTASSIUM 25 MG/1
25 TABLET ORAL EVERY MORNING
Qty: 90 TABLET | Refills: 4 | Status: SHIPPED | OUTPATIENT
Start: 2021-06-22 | End: 2021-08-18 | Stop reason: SDUPTHER

## 2021-08-06 ENCOUNTER — TELEPHONE (OUTPATIENT)
Dept: OBSTETRICS AND GYNECOLOGY | Facility: CLINIC | Age: 76
End: 2021-08-06

## 2021-08-11 ENCOUNTER — LAB VISIT (OUTPATIENT)
Dept: LAB | Facility: HOSPITAL | Age: 76
End: 2021-08-11
Attending: INTERNAL MEDICINE
Payer: MEDICARE

## 2021-08-11 DIAGNOSIS — Z95.5 S/P CORONARY ARTERY STENT PLACEMENT: Chronic | ICD-10-CM

## 2021-08-11 DIAGNOSIS — Z95.1 S/P CABG (CORONARY ARTERY BYPASS GRAFT): ICD-10-CM

## 2021-08-11 DIAGNOSIS — I47.10 SVT (SUPRAVENTRICULAR TACHYCARDIA): ICD-10-CM

## 2021-08-11 DIAGNOSIS — I25.10 CORONARY ARTERY DISEASE INVOLVING NATIVE CORONARY ARTERY OF NATIVE HEART WITHOUT ANGINA PECTORIS: ICD-10-CM

## 2021-08-11 DIAGNOSIS — I10 ESSENTIAL HYPERTENSION: ICD-10-CM

## 2021-08-11 LAB
ALBUMIN SERPL BCP-MCNC: 3.7 G/DL (ref 3.5–5.2)
ALP SERPL-CCNC: 41 U/L (ref 55–135)
ALT SERPL W/O P-5'-P-CCNC: 16 U/L (ref 10–44)
ANION GAP SERPL CALC-SCNC: 8 MMOL/L (ref 8–16)
AST SERPL-CCNC: 21 U/L (ref 10–40)
BILIRUB SERPL-MCNC: 0.5 MG/DL (ref 0.1–1)
BUN SERPL-MCNC: 20 MG/DL (ref 8–23)
CALCIUM SERPL-MCNC: 9.5 MG/DL (ref 8.7–10.5)
CHLORIDE SERPL-SCNC: 106 MMOL/L (ref 95–110)
CHOLEST SERPL-MCNC: 99 MG/DL (ref 120–199)
CHOLEST/HDLC SERPL: 4.3 {RATIO} (ref 2–5)
CK SERPL-CCNC: 57 U/L (ref 20–180)
CO2 SERPL-SCNC: 23 MMOL/L (ref 23–29)
CREAT SERPL-MCNC: 1.4 MG/DL (ref 0.5–1.4)
EST. GFR  (AFRICAN AMERICAN): 42.7 ML/MIN/1.73 M^2
EST. GFR  (NON AFRICAN AMERICAN): 37 ML/MIN/1.73 M^2
GLUCOSE SERPL-MCNC: 120 MG/DL (ref 70–110)
HDLC SERPL-MCNC: 23 MG/DL (ref 40–75)
HDLC SERPL: 23.2 % (ref 20–50)
LDLC SERPL CALC-MCNC: 28 MG/DL (ref 63–159)
NONHDLC SERPL-MCNC: 76 MG/DL
POTASSIUM SERPL-SCNC: 4.3 MMOL/L (ref 3.5–5.1)
PROT SERPL-MCNC: 7.1 G/DL (ref 6–8.4)
SODIUM SERPL-SCNC: 137 MMOL/L (ref 136–145)
TRIGL SERPL-MCNC: 240 MG/DL (ref 30–150)

## 2021-08-11 PROCEDURE — 80053 COMPREHEN METABOLIC PANEL: CPT | Performed by: INTERNAL MEDICINE

## 2021-08-11 PROCEDURE — 82550 ASSAY OF CK (CPK): CPT | Performed by: INTERNAL MEDICINE

## 2021-08-11 PROCEDURE — 80061 LIPID PANEL: CPT | Performed by: INTERNAL MEDICINE

## 2021-08-11 PROCEDURE — 36415 COLL VENOUS BLD VENIPUNCTURE: CPT | Mod: PO | Performed by: INTERNAL MEDICINE

## 2021-08-12 DIAGNOSIS — E11.9 TYPE 2 DIABETES MELLITUS WITHOUT COMPLICATION: ICD-10-CM

## 2021-08-12 DIAGNOSIS — I25.10 CORONARY ARTERY DISEASE INVOLVING NATIVE CORONARY ARTERY OF NATIVE HEART WITHOUT ANGINA PECTORIS: Chronic | ICD-10-CM

## 2021-08-12 DIAGNOSIS — E78.5 DYSLIPIDEMIA: ICD-10-CM

## 2021-08-12 DIAGNOSIS — I10 ESSENTIAL HYPERTENSION: ICD-10-CM

## 2021-08-12 DIAGNOSIS — Z95.5 S/P CORONARY ARTERY STENT PLACEMENT: Chronic | ICD-10-CM

## 2021-08-16 RX ORDER — ISOSORBIDE MONONITRATE 60 MG/1
TABLET, EXTENDED RELEASE ORAL
Qty: 90 TABLET | Refills: 4 | Status: SHIPPED | OUTPATIENT
Start: 2021-08-16 | End: 2021-08-18 | Stop reason: SDUPTHER

## 2021-08-18 ENCOUNTER — OFFICE VISIT (OUTPATIENT)
Dept: CARDIOLOGY | Facility: CLINIC | Age: 76
End: 2021-08-18
Payer: MEDICARE

## 2021-08-18 VITALS
HEIGHT: 66 IN | HEART RATE: 68 BPM | BODY MASS INDEX: 31.43 KG/M2 | SYSTOLIC BLOOD PRESSURE: 131 MMHG | WEIGHT: 195.56 LBS | DIASTOLIC BLOOD PRESSURE: 65 MMHG

## 2021-08-18 DIAGNOSIS — Z95.5 S/P CORONARY ARTERY STENT PLACEMENT: Chronic | ICD-10-CM

## 2021-08-18 DIAGNOSIS — E78.5 DYSLIPIDEMIA: ICD-10-CM

## 2021-08-18 DIAGNOSIS — Z95.1 S/P CABG (CORONARY ARTERY BYPASS GRAFT): ICD-10-CM

## 2021-08-18 DIAGNOSIS — Z95.1 S/P CABG (CORONARY ARTERY BYPASS GRAFT): Primary | ICD-10-CM

## 2021-08-18 DIAGNOSIS — I25.10 CORONARY ARTERY DISEASE INVOLVING NATIVE CORONARY ARTERY OF NATIVE HEART WITHOUT ANGINA PECTORIS: Chronic | ICD-10-CM

## 2021-08-18 DIAGNOSIS — I47.10 SVT (SUPRAVENTRICULAR TACHYCARDIA): ICD-10-CM

## 2021-08-18 DIAGNOSIS — I25.10 CORONARY ARTERY DISEASE INVOLVING NATIVE CORONARY ARTERY OF NATIVE HEART WITHOUT ANGINA PECTORIS: ICD-10-CM

## 2021-08-18 DIAGNOSIS — Z78.9 STATIN INTOLERANCE: ICD-10-CM

## 2021-08-18 DIAGNOSIS — I10 ESSENTIAL HYPERTENSION: ICD-10-CM

## 2021-08-18 DIAGNOSIS — E11.9 TYPE 2 DIABETES MELLITUS WITHOUT COMPLICATION: ICD-10-CM

## 2021-08-18 DIAGNOSIS — Z95.5 S/P CORONARY ARTERY STENT PLACEMENT: ICD-10-CM

## 2021-08-18 PROCEDURE — 3078F PR MOST RECENT DIASTOLIC BLOOD PRESSURE < 80 MM HG: ICD-10-PCS | Mod: CPTII,S$GLB,, | Performed by: INTERNAL MEDICINE

## 2021-08-18 PROCEDURE — 3044F HG A1C LEVEL LT 7.0%: CPT | Mod: CPTII,S$GLB,, | Performed by: INTERNAL MEDICINE

## 2021-08-18 PROCEDURE — 1160F RVW MEDS BY RX/DR IN RCRD: CPT | Mod: CPTII,S$GLB,, | Performed by: INTERNAL MEDICINE

## 2021-08-18 PROCEDURE — 1101F PR PT FALLS ASSESS DOC 0-1 FALLS W/OUT INJ PAST YR: ICD-10-PCS | Mod: CPTII,S$GLB,, | Performed by: INTERNAL MEDICINE

## 2021-08-18 PROCEDURE — 1126F PR PAIN SEVERITY QUANTIFIED, NO PAIN PRESENT: ICD-10-PCS | Mod: CPTII,S$GLB,, | Performed by: INTERNAL MEDICINE

## 2021-08-18 PROCEDURE — 3288F PR FALLS RISK ASSESSMENT DOCUMENTED: ICD-10-PCS | Mod: CPTII,S$GLB,, | Performed by: INTERNAL MEDICINE

## 2021-08-18 PROCEDURE — 99999 PR PBB SHADOW E&M-EST. PATIENT-LVL III: ICD-10-PCS | Mod: PBBFAC,,, | Performed by: INTERNAL MEDICINE

## 2021-08-18 PROCEDURE — 99214 OFFICE O/P EST MOD 30 MIN: CPT | Mod: S$GLB,,, | Performed by: INTERNAL MEDICINE

## 2021-08-18 PROCEDURE — 1159F MED LIST DOCD IN RCRD: CPT | Mod: CPTII,S$GLB,, | Performed by: INTERNAL MEDICINE

## 2021-08-18 PROCEDURE — 3044F PR MOST RECENT HEMOGLOBIN A1C LEVEL <7.0%: ICD-10-PCS | Mod: CPTII,S$GLB,, | Performed by: INTERNAL MEDICINE

## 2021-08-18 PROCEDURE — 99214 PR OFFICE/OUTPT VISIT, EST, LEVL IV, 30-39 MIN: ICD-10-PCS | Mod: S$GLB,,, | Performed by: INTERNAL MEDICINE

## 2021-08-18 PROCEDURE — 3288F FALL RISK ASSESSMENT DOCD: CPT | Mod: CPTII,S$GLB,, | Performed by: INTERNAL MEDICINE

## 2021-08-18 PROCEDURE — 3008F PR BODY MASS INDEX (BMI) DOCUMENTED: ICD-10-PCS | Mod: CPTII,S$GLB,, | Performed by: INTERNAL MEDICINE

## 2021-08-18 PROCEDURE — 3078F DIAST BP <80 MM HG: CPT | Mod: CPTII,S$GLB,, | Performed by: INTERNAL MEDICINE

## 2021-08-18 PROCEDURE — 3008F BODY MASS INDEX DOCD: CPT | Mod: CPTII,S$GLB,, | Performed by: INTERNAL MEDICINE

## 2021-08-18 PROCEDURE — 3075F SYST BP GE 130 - 139MM HG: CPT | Mod: CPTII,S$GLB,, | Performed by: INTERNAL MEDICINE

## 2021-08-18 PROCEDURE — 3075F PR MOST RECENT SYSTOLIC BLOOD PRESS GE 130-139MM HG: ICD-10-PCS | Mod: CPTII,S$GLB,, | Performed by: INTERNAL MEDICINE

## 2021-08-18 PROCEDURE — 1101F PT FALLS ASSESS-DOCD LE1/YR: CPT | Mod: CPTII,S$GLB,, | Performed by: INTERNAL MEDICINE

## 2021-08-18 PROCEDURE — 1160F PR REVIEW ALL MEDS BY PRESCRIBER/CLIN PHARMACIST DOCUMENTED: ICD-10-PCS | Mod: CPTII,S$GLB,, | Performed by: INTERNAL MEDICINE

## 2021-08-18 PROCEDURE — 1126F AMNT PAIN NOTED NONE PRSNT: CPT | Mod: CPTII,S$GLB,, | Performed by: INTERNAL MEDICINE

## 2021-08-18 PROCEDURE — 1159F PR MEDICATION LIST DOCUMENTED IN MEDICAL RECORD: ICD-10-PCS | Mod: CPTII,S$GLB,, | Performed by: INTERNAL MEDICINE

## 2021-08-18 PROCEDURE — 99999 PR PBB SHADOW E&M-EST. PATIENT-LVL III: CPT | Mod: PBBFAC,,, | Performed by: INTERNAL MEDICINE

## 2021-08-18 RX ORDER — ISOSORBIDE MONONITRATE 60 MG/1
60 TABLET, EXTENDED RELEASE ORAL NIGHTLY
Qty: 90 TABLET | Refills: 4 | Status: SHIPPED | OUTPATIENT
Start: 2021-08-18 | End: 2022-09-05

## 2021-08-18 RX ORDER — EZETIMIBE 10 MG/1
10 TABLET ORAL DAILY
Qty: 90 TABLET | Refills: 3 | Status: SHIPPED | OUTPATIENT
Start: 2021-08-18 | End: 2021-09-01

## 2021-08-18 RX ORDER — NITROGLYCERIN 0.4 MG/1
0.4 TABLET SUBLINGUAL EVERY 5 MIN PRN
Qty: 25 TABLET | Refills: 12 | Status: SHIPPED | OUTPATIENT
Start: 2021-08-18 | End: 2021-12-28 | Stop reason: SDUPTHER

## 2021-08-18 RX ORDER — LOSARTAN POTASSIUM 25 MG/1
25 TABLET ORAL EVERY MORNING
Qty: 90 TABLET | Refills: 4 | Status: SHIPPED | OUTPATIENT
Start: 2021-08-18 | End: 2022-02-03 | Stop reason: ALTCHOICE

## 2021-08-18 RX ORDER — PITAVASTATIN CALCIUM 2.09 MG/1
2 TABLET, FILM COATED ORAL NIGHTLY
Qty: 90 TABLET | Refills: 3 | Status: SHIPPED | OUTPATIENT
Start: 2021-08-18 | End: 2022-03-04 | Stop reason: SDUPTHER

## 2021-08-18 RX ORDER — FAMOTIDINE 20 MG/1
20 TABLET, FILM COATED ORAL 2 TIMES DAILY
COMMUNITY

## 2021-08-18 RX ORDER — CARVEDILOL 25 MG/1
25 TABLET ORAL 2 TIMES DAILY
Qty: 180 TABLET | Refills: 4 | Status: SHIPPED | OUTPATIENT
Start: 2021-08-18 | End: 2021-11-03

## 2021-08-18 RX ORDER — FENOFIBRATE 160 MG/1
160 TABLET ORAL DAILY
Qty: 90 TABLET | Refills: 3 | Status: SHIPPED | OUTPATIENT
Start: 2021-08-18 | End: 2022-09-12

## 2021-08-18 RX ORDER — CLOPIDOGREL BISULFATE 75 MG/1
75 TABLET ORAL DAILY
Qty: 90 TABLET | Refills: 4 | Status: SHIPPED | OUTPATIENT
Start: 2021-08-18 | End: 2021-09-21

## 2021-08-24 ENCOUNTER — OFFICE VISIT (OUTPATIENT)
Dept: OBSTETRICS AND GYNECOLOGY | Facility: CLINIC | Age: 76
End: 2021-08-24
Payer: MEDICARE

## 2021-08-24 ENCOUNTER — HOSPITAL ENCOUNTER (OUTPATIENT)
Dept: RADIOLOGY | Facility: HOSPITAL | Age: 76
Discharge: HOME | End: 2021-08-24
Attending: OBSTETRICS & GYNECOLOGY
Payer: MEDICARE

## 2021-08-24 VITALS
BODY MASS INDEX: 31.43 KG/M2 | HEIGHT: 66 IN | WEIGHT: 195.56 LBS | SYSTOLIC BLOOD PRESSURE: 138 MMHG | DIASTOLIC BLOOD PRESSURE: 64 MMHG

## 2021-08-24 DIAGNOSIS — Z12.31 OTHER SCREENING MAMMOGRAM: Primary | ICD-10-CM

## 2021-08-24 DIAGNOSIS — Z12.31 OTHER SCREENING MAMMOGRAM: ICD-10-CM

## 2021-08-24 DIAGNOSIS — Z01.419 PAP TEST, AS PART OF ROUTINE GYNECOLOGICAL EXAMINATION: ICD-10-CM

## 2021-08-24 DIAGNOSIS — Z01.419 ENCOUNTER FOR ANNUAL ROUTINE GYNECOLOGICAL EXAMINATION: Primary | ICD-10-CM

## 2021-08-24 PROCEDURE — 77063 MAMMO DIGITAL SCREENING BILAT WITH TOMO: ICD-10-PCS | Mod: 26,,, | Performed by: RADIOLOGY

## 2021-08-24 PROCEDURE — 3008F BODY MASS INDEX DOCD: CPT | Mod: CPTII,S$GLB,, | Performed by: OBSTETRICS & GYNECOLOGY

## 2021-08-24 PROCEDURE — 3075F PR MOST RECENT SYSTOLIC BLOOD PRESS GE 130-139MM HG: ICD-10-PCS | Mod: CPTII,S$GLB,, | Performed by: OBSTETRICS & GYNECOLOGY

## 2021-08-24 PROCEDURE — 3288F PR FALLS RISK ASSESSMENT DOCUMENTED: ICD-10-PCS | Mod: CPTII,S$GLB,, | Performed by: OBSTETRICS & GYNECOLOGY

## 2021-08-24 PROCEDURE — 77067 SCR MAMMO BI INCL CAD: CPT | Mod: TC,PN

## 2021-08-24 PROCEDURE — 3078F DIAST BP <80 MM HG: CPT | Mod: CPTII,S$GLB,, | Performed by: OBSTETRICS & GYNECOLOGY

## 2021-08-24 PROCEDURE — 1159F PR MEDICATION LIST DOCUMENTED IN MEDICAL RECORD: ICD-10-PCS | Mod: CPTII,S$GLB,, | Performed by: OBSTETRICS & GYNECOLOGY

## 2021-08-24 PROCEDURE — 77067 SCR MAMMO BI INCL CAD: CPT | Mod: 26,,, | Performed by: RADIOLOGY

## 2021-08-24 PROCEDURE — G0101 CA SCREEN;PELVIC/BREAST EXAM: HCPCS | Mod: S$GLB,,, | Performed by: OBSTETRICS & GYNECOLOGY

## 2021-08-24 PROCEDURE — 3044F HG A1C LEVEL LT 7.0%: CPT | Mod: CPTII,S$GLB,, | Performed by: OBSTETRICS & GYNECOLOGY

## 2021-08-24 PROCEDURE — 1101F PT FALLS ASSESS-DOCD LE1/YR: CPT | Mod: CPTII,S$GLB,, | Performed by: OBSTETRICS & GYNECOLOGY

## 2021-08-24 PROCEDURE — 1126F PR PAIN SEVERITY QUANTIFIED, NO PAIN PRESENT: ICD-10-PCS | Mod: CPTII,S$GLB,, | Performed by: OBSTETRICS & GYNECOLOGY

## 2021-08-24 PROCEDURE — G0101 PR CA SCREEN;PELVIC/BREAST EXAM: ICD-10-PCS | Mod: S$GLB,,, | Performed by: OBSTETRICS & GYNECOLOGY

## 2021-08-24 PROCEDURE — 3075F SYST BP GE 130 - 139MM HG: CPT | Mod: CPTII,S$GLB,, | Performed by: OBSTETRICS & GYNECOLOGY

## 2021-08-24 PROCEDURE — 3078F PR MOST RECENT DIASTOLIC BLOOD PRESSURE < 80 MM HG: ICD-10-PCS | Mod: CPTII,S$GLB,, | Performed by: OBSTETRICS & GYNECOLOGY

## 2021-08-24 PROCEDURE — 1101F PR PT FALLS ASSESS DOC 0-1 FALLS W/OUT INJ PAST YR: ICD-10-PCS | Mod: CPTII,S$GLB,, | Performed by: OBSTETRICS & GYNECOLOGY

## 2021-08-24 PROCEDURE — 99999 PR PBB SHADOW E&M-EST. PATIENT-LVL IV: ICD-10-PCS | Mod: PBBFAC,,, | Performed by: OBSTETRICS & GYNECOLOGY

## 2021-08-24 PROCEDURE — 77063 BREAST TOMOSYNTHESIS BI: CPT | Mod: 26,,, | Performed by: RADIOLOGY

## 2021-08-24 PROCEDURE — 88175 CYTOPATH C/V AUTO FLUID REDO: CPT | Performed by: OBSTETRICS & GYNECOLOGY

## 2021-08-24 PROCEDURE — 1126F AMNT PAIN NOTED NONE PRSNT: CPT | Mod: CPTII,S$GLB,, | Performed by: OBSTETRICS & GYNECOLOGY

## 2021-08-24 PROCEDURE — 77067 MAMMO DIGITAL SCREENING BILAT WITH TOMO: ICD-10-PCS | Mod: 26,,, | Performed by: RADIOLOGY

## 2021-08-24 PROCEDURE — 3044F PR MOST RECENT HEMOGLOBIN A1C LEVEL <7.0%: ICD-10-PCS | Mod: CPTII,S$GLB,, | Performed by: OBSTETRICS & GYNECOLOGY

## 2021-08-24 PROCEDURE — 1159F MED LIST DOCD IN RCRD: CPT | Mod: CPTII,S$GLB,, | Performed by: OBSTETRICS & GYNECOLOGY

## 2021-08-24 PROCEDURE — 99999 PR PBB SHADOW E&M-EST. PATIENT-LVL IV: CPT | Mod: PBBFAC,,, | Performed by: OBSTETRICS & GYNECOLOGY

## 2021-08-24 PROCEDURE — 3288F FALL RISK ASSESSMENT DOCD: CPT | Mod: CPTII,S$GLB,, | Performed by: OBSTETRICS & GYNECOLOGY

## 2021-08-24 PROCEDURE — 3008F PR BODY MASS INDEX (BMI) DOCUMENTED: ICD-10-PCS | Mod: CPTII,S$GLB,, | Performed by: OBSTETRICS & GYNECOLOGY

## 2021-08-25 ENCOUNTER — TELEPHONE (OUTPATIENT)
Dept: RADIOLOGY | Facility: HOSPITAL | Age: 76
End: 2021-08-25

## 2021-09-08 ENCOUNTER — HOSPITAL ENCOUNTER (OUTPATIENT)
Dept: RADIOLOGY | Facility: HOSPITAL | Age: 76
Discharge: HOME | End: 2021-09-08
Attending: OBSTETRICS & GYNECOLOGY
Payer: MEDICARE

## 2021-09-08 DIAGNOSIS — R92.8 ABNORMAL MAMMOGRAM OF LEFT BREAST: ICD-10-CM

## 2021-09-08 DIAGNOSIS — R92.8 ABNORMAL MAMMOGRAM: ICD-10-CM

## 2021-09-08 PROCEDURE — 77061 MAMMO DIGITAL DIAGNOSTIC LEFT WITH TOMO: ICD-10-PCS | Mod: 26,LT,, | Performed by: RADIOLOGY

## 2021-09-08 PROCEDURE — 77065 DX MAMMO INCL CAD UNI: CPT | Mod: 26,LT,, | Performed by: RADIOLOGY

## 2021-09-08 PROCEDURE — 76642 ULTRASOUND BREAST LIMITED: CPT | Mod: 26,LT,, | Performed by: RADIOLOGY

## 2021-09-08 PROCEDURE — 77061 BREAST TOMOSYNTHESIS UNI: CPT | Mod: TC,PO,LT

## 2021-09-08 PROCEDURE — 77061 BREAST TOMOSYNTHESIS UNI: CPT | Mod: 26,LT,, | Performed by: RADIOLOGY

## 2021-09-08 PROCEDURE — 76642 ULTRASOUND BREAST LIMITED: CPT | Mod: TC,PO,LT

## 2021-09-08 PROCEDURE — 76642 US BREAST LEFT LIMITED: ICD-10-PCS | Mod: 26,LT,, | Performed by: RADIOLOGY

## 2021-09-08 PROCEDURE — 77065 MAMMO DIGITAL DIAGNOSTIC LEFT WITH TOMO: ICD-10-PCS | Mod: 26,LT,, | Performed by: RADIOLOGY

## 2021-09-21 DIAGNOSIS — I10 ESSENTIAL HYPERTENSION: ICD-10-CM

## 2021-09-21 DIAGNOSIS — Z95.5 S/P CORONARY ARTERY STENT PLACEMENT: Chronic | ICD-10-CM

## 2021-09-21 DIAGNOSIS — E78.5 DYSLIPIDEMIA: ICD-10-CM

## 2021-09-21 DIAGNOSIS — I25.10 CORONARY ARTERY DISEASE INVOLVING NATIVE CORONARY ARTERY OF NATIVE HEART WITHOUT ANGINA PECTORIS: Chronic | ICD-10-CM

## 2021-09-21 DIAGNOSIS — E11.9 TYPE 2 DIABETES MELLITUS WITHOUT COMPLICATION: ICD-10-CM

## 2021-09-21 RX ORDER — CLOPIDOGREL BISULFATE 75 MG/1
TABLET ORAL
Qty: 90 TABLET | Refills: 4 | Status: ON HOLD | OUTPATIENT
Start: 2021-09-21 | End: 2023-02-09 | Stop reason: SDUPTHER

## 2021-12-03 ENCOUNTER — PATIENT MESSAGE (OUTPATIENT)
Dept: CARDIOLOGY | Facility: CLINIC | Age: 76
End: 2021-12-03
Payer: MEDICARE

## 2021-12-06 ENCOUNTER — PATIENT MESSAGE (OUTPATIENT)
Dept: CARDIOLOGY | Facility: CLINIC | Age: 76
End: 2021-12-06
Payer: MEDICARE

## 2021-12-09 ENCOUNTER — TELEPHONE (OUTPATIENT)
Dept: CARDIOLOGY | Facility: CLINIC | Age: 76
End: 2021-12-09
Payer: MEDICARE

## 2021-12-28 ENCOUNTER — PATIENT MESSAGE (OUTPATIENT)
Dept: CARDIOLOGY | Facility: CLINIC | Age: 76
End: 2021-12-28
Payer: MEDICARE

## 2021-12-28 DIAGNOSIS — Z95.5 S/P CORONARY ARTERY STENT PLACEMENT: Primary | ICD-10-CM

## 2021-12-29 RX ORDER — NITROGLYCERIN 0.4 MG/1
0.4 TABLET SUBLINGUAL EVERY 5 MIN PRN
Qty: 25 TABLET | Refills: 4 | Status: SHIPPED | OUTPATIENT
Start: 2021-12-29 | End: 2022-02-03 | Stop reason: SDUPTHER

## 2022-01-28 ENCOUNTER — TELEPHONE (OUTPATIENT)
Dept: GASTROENTEROLOGY | Facility: CLINIC | Age: 77
End: 2022-01-28
Payer: MEDICARE

## 2022-01-28 NOTE — TELEPHONE ENCOUNTER
Attempted to reach the patient to set up her colonoscopy, left a message asking for a call back, clinic number provided.

## 2022-02-01 ENCOUNTER — TELEPHONE (OUTPATIENT)
Dept: GASTROENTEROLOGY | Facility: CLINIC | Age: 77
End: 2022-02-01
Payer: MEDICARE

## 2022-02-01 NOTE — TELEPHONE ENCOUNTER
Attempted to return call to the patient, left message asking for a call back to the clinic, clinic number provided.

## 2022-02-01 NOTE — TELEPHONE ENCOUNTER
----- Message from Mayte Ruiz sent at 2/1/2022  3:48 PM CST -----  Regarding: returning call  Contact: pt  Type:  Patient Returning Call    Who Called:  pt  Who Left Message for Patient:  ruby  Does the patient know what this is regarding?:  appt  Best Call Back Number: [phone]    Additional Information:  please call back

## 2022-02-01 NOTE — TELEPHONE ENCOUNTER
Called and left a message asking the patient to call the clinic back to set up colonoscopy, clinic number provided.

## 2022-02-01 NOTE — TELEPHONE ENCOUNTER
----- Message from Lexis Gongora sent at 2/1/2022  1:11 PM CST -----  Regarding: returning call  Contact: patient  Type:  Patient Returning Call    Who Called:  patient  Who Left Message for Patient:  Sara  Does the patient know what this is regarding?:  colonoscopy appt  Best Call Back Number: [phone] cell  Additional Information: Please call patient at this number. Thanks!

## 2022-02-02 ENCOUNTER — TELEPHONE (OUTPATIENT)
Dept: GASTROENTEROLOGY | Facility: CLINIC | Age: 77
End: 2022-02-02
Payer: MEDICARE

## 2022-02-02 ENCOUNTER — PATIENT MESSAGE (OUTPATIENT)
Dept: CARDIOLOGY | Facility: CLINIC | Age: 77
End: 2022-02-02
Payer: MEDICARE

## 2022-02-02 ENCOUNTER — PATIENT MESSAGE (OUTPATIENT)
Dept: GASTROENTEROLOGY | Facility: CLINIC | Age: 77
End: 2022-02-02
Payer: MEDICARE

## 2022-02-02 DIAGNOSIS — Z95.5 S/P CORONARY ARTERY STENT PLACEMENT: ICD-10-CM

## 2022-02-02 DIAGNOSIS — Z01.818 PRE-OP TESTING: Primary | ICD-10-CM

## 2022-02-02 NOTE — TELEPHONE ENCOUNTER
----- Message from Ananth Erickson sent at 2/2/2022  8:32 AM CST -----  Type:  Patient Returning Call    Who Called:  Patient  Who Left Message for Patient:  Sara  Does the patient know what this is regarding?:  Colonoscopy  Best Call Back Number:  [phone]  Additional Information:

## 2022-02-02 NOTE — TELEPHONE ENCOUNTER
Returned call to the patient, patient was scheduled for her pre procedure covid test as well as her colonoscopy, my chart message sent to patient with prep instructions per patient request, patient verbalized understanding of this.

## 2022-02-03 ENCOUNTER — PATIENT MESSAGE (OUTPATIENT)
Dept: GASTROENTEROLOGY | Facility: CLINIC | Age: 77
End: 2022-02-03
Payer: MEDICARE

## 2022-02-03 RX ORDER — NITROGLYCERIN 0.4 MG/1
0.4 TABLET SUBLINGUAL EVERY 5 MIN PRN
Qty: 25 TABLET | Refills: 4 | Status: SHIPPED | OUTPATIENT
Start: 2022-02-03

## 2022-02-03 RX ORDER — VALSARTAN 160 MG/1
160 TABLET ORAL 2 TIMES DAILY
Qty: 180 TABLET | Refills: 3 | Status: SHIPPED | OUTPATIENT
Start: 2022-02-03 | End: 2022-05-30

## 2022-02-08 ENCOUNTER — PATIENT MESSAGE (OUTPATIENT)
Dept: GASTROENTEROLOGY | Facility: CLINIC | Age: 77
End: 2022-02-08
Payer: MEDICARE

## 2022-02-13 ENCOUNTER — PATIENT MESSAGE (OUTPATIENT)
Dept: CARDIOLOGY | Facility: CLINIC | Age: 77
End: 2022-02-13
Payer: MEDICARE

## 2022-02-14 ENCOUNTER — PATIENT MESSAGE (OUTPATIENT)
Dept: CARDIOLOGY | Facility: CLINIC | Age: 77
End: 2022-02-14
Payer: MEDICARE

## 2022-02-18 RX ORDER — FENOFIBRATE 54 MG/1
TABLET ORAL
Qty: 90 TABLET | Refills: 4 | Status: SHIPPED | OUTPATIENT
Start: 2022-02-18 | End: 2022-05-30

## 2022-03-04 ENCOUNTER — PATIENT MESSAGE (OUTPATIENT)
Dept: CARDIOLOGY | Facility: CLINIC | Age: 77
End: 2022-03-04
Payer: MEDICARE

## 2022-03-04 DIAGNOSIS — E78.5 DYSLIPIDEMIA: ICD-10-CM

## 2022-03-04 DIAGNOSIS — Z78.9 STATIN INTOLERANCE: ICD-10-CM

## 2022-03-04 DIAGNOSIS — I25.10 CORONARY ARTERY DISEASE INVOLVING NATIVE CORONARY ARTERY OF NATIVE HEART WITHOUT ANGINA PECTORIS: ICD-10-CM

## 2022-03-04 DIAGNOSIS — Z95.5 S/P CORONARY ARTERY STENT PLACEMENT: Chronic | ICD-10-CM

## 2022-03-04 DIAGNOSIS — Z95.1 S/P CABG (CORONARY ARTERY BYPASS GRAFT): ICD-10-CM

## 2022-03-04 RX ORDER — PITAVASTATIN CALCIUM 2.09 MG/1
2 TABLET, FILM COATED ORAL NIGHTLY
Qty: 90 TABLET | Refills: 3 | Status: SHIPPED | OUTPATIENT
Start: 2022-03-04 | End: 2022-08-29

## 2022-04-07 ENCOUNTER — TELEPHONE (OUTPATIENT)
Dept: GASTROENTEROLOGY | Facility: CLINIC | Age: 77
End: 2022-04-07
Payer: MEDICARE

## 2022-04-07 NOTE — TELEPHONE ENCOUNTER
Attempted to reach the patient to notify her that she is no longer required to have the pre procedure covid test performed on 04/11/2022 , left a message with this information and that the pre procedure covid test has been cancelled for her, clinic number provided.

## 2022-04-13 ENCOUNTER — TELEPHONE (OUTPATIENT)
Dept: ENDOSCOPY | Facility: HOSPITAL | Age: 77
End: 2022-04-13
Payer: MEDICARE

## 2022-04-13 NOTE — TELEPHONE ENCOUNTER
Called and spoke with the patient, patient was given the colonoscopy prep instructions, patient verbalized understanding of this and states that she finally found the instructions on her my chart from February and has printed them out on her computer at home.  Patient states that she accidentally took her plavix and aspirin last night and does not know if Dr. Ornelas will even do her procedure tomorrow, called endoscopy unit, spoke with Dr. Ornelas, per Dr. Ornelas- will proceed with scope tomorrow, called and spoke with the patient, patient notified that Dr. Ornelas will proceed to do her procedure tomorrow however she can not take the plavix or aspirin today and will be able to resume it once Dr. Ornelas advises her tomorrow on when to restart it, patient verbalized understanding of this.

## 2022-04-13 NOTE — H&P
History & Physical - Short Stay  Gastroenterology      SUBJECTIVE:     Procedure: Colonoscopy    Chief Complaint/Indication for Procedure: Surveillance, Hx of colon polyps    History of Present Illness:  The patient presents for routine colonoscopy, because of her hx of colon polyps..  She is asymptomatic.    See the last Colonoscopy 5/23/2012 :  Indications:         Rectal bleeding off and on for the last 2 months.                        High risk colon cancer surveillance: Personal                        history of colonic polyps, Last colonoscopy: July 20, 2006   Impression:  - One 1 mm polyp in the descending colon. Resected and retrieved.                        - One 1 mm polyp in the proximal ascending colon.                        Resected and retrieved.                        - Internal hemorrhoids.                        - Tortuous colon.                        - The examination was otherwise normal.                        - The examined portion of the ileum was normal.   Recommendation:      - Discharge patient to home.                        - Await pathology results.                        - If the pathology report reveals adenomatous                        tissue, then repeat the colonoscopy for surveillance                        in 4-5 years.                        - If the pathology report indicates hyperplastic                        polyp, then repeat colonoscopy for surveillance in                        6-7 years.                        - High fiber diet.                        - Anusol (pramoxine) HC suppository: Insert rectally                        as necessary.                        - Call the G.I. clinic in 2 weeks for reports (if                        you haven't heard from us sooner) [phone].                        -  CONSIDER MAC / PROPOFOL FOR FUTURE                        COLONOSCOPIES.   Amaury Ornelas MD   5/23/2012     SPECIMEN  1) Descending colon polyp.  2) Ascending  colon polyp.  FINAL PATHOLOGIC DIAGNOSIS  1. HYPERPLASTIC POLYP.  2. FRAGMENT OF NONNEOPLASTIC COLONIC MUCOSA.        PTA Medications   Medication Sig    ALBUTEROL SULFATE (VENTOLIN HFA INHL) Inhale 2 Inhalers into the lungs as needed.     aspirin (ECOTRIN) 81 MG EC tablet Take 81 mg by mouth once daily.    carvediloL (COREG) 25 MG tablet Take 1 tablet by mouth twice daily    cetirizine (ZYRTEC) 10 MG tablet Take 1 tablet by mouth every evening.     clopidogreL (PLAVIX) 75 mg tablet Take 1 tablet by mouth once daily    diclofenac sodium 1 % Gel 4 g.    ezetimibe (ZETIA) 10 mg tablet Take 1 tablet by mouth once daily    famotidine (PEPCID) 20 MG tablet Take 20 mg by mouth 2 (two) times daily.    fenofibrate (TRICOR) 54 MG tablet Take 1 tablet by mouth once daily    fenofibrate 160 MG Tab Take 1 tablet (160 mg total) by mouth once daily.    isosorbide mononitrate (IMDUR) 60 MG 24 hr tablet Take 1 tablet (60 mg total) by mouth every evening.    LIVALO 4 mg Tab Take 1 tablet by mouth once daily    metFORMIN (GLUCOPHAGE) 500 MG tablet Take 500 mg by mouth once daily.    omega-3 fatty acids-vitamin E 1,000 mg Cap Take 2,400 mg by mouth 2 (two) times daily.     pitavastatin calcium (LIVALO) 2 mg Tab tablet Take 1 tablet (2 mg total) by mouth every evening.    triamcinolone acetonide 0.025% (KENALOG) 0.025 % cream Apply topically 2 (two) times daily. Apply for 2 weeks, then take a break for 2 weeks. Repeat as needed.    valsartan (DIOVAN) 160 MG tablet Take 1 tablet (160 mg total) by mouth 2 (two) times daily. (Patient taking differently: Take 40 mg by mouth 2 (two) times daily. Taking 40 mg once daily. Pt states if she takes more, she sees spots and blacks out. Her MD is aware.)    conjugated estrogens (PREMARIN) vaginal cream Place 1 g vaginally once daily. (Patient not taking: Reported on 8/24/2021)    nitroGLYCERIN (NITROSTAT) 0.4 MG SL tablet Place 1 tablet (0.4 mg total) under the tongue every 5  (five) minutes as needed for Chest pain.       Review of patient's allergies indicates:   Allergen Reactions    Oxycodone-acetaminophen Hives    Digitalis glycosides Other (See Comments)     Severe joint pain    Digoxin Other (See Comments)     Numbness and painful to move    Statins-hmg-coa reductase inhibitors      Myalgias, alpoecia    Erythromycin Other (See Comments)     Pt reports tachycardia and andres skin to neck and face    Latex, natural rubber Nausea Only and Rash     Nausea at dentist    Penicillins Rash    Sulfa (sulfonamide antibiotics) Rash        Past Medical History:   Diagnosis Date    Anticoagulant long-term use     Plavix and ASA therapy    Arthritis     Asthma     seasonal, allergies    Blood in stool     Blood transfusion     Coronary artery disease     stent X3    Coronary artery disease involving native coronary artery of native heart without angina pectoris 6/1/2016    Diabetes mellitus     Dyslipidemia 6/1/2016    GERD (gastroesophageal reflux disease)     Hypertension     S/P CABG (coronary artery bypass graft) x 3 1991 6/1/2016    S/P CABG  x 3 1991 LIMA-LAD, VG-D (occluded), VG-RCA    S/P coronary artery stent placement 6/1/2016 7/2016 PDA- 2.25 x 24, synergy  8/15 lad/diag promus 2.5x16  8/2009 vg-rca- promus 3.5x8    Statin intolerance 11/30/2017    Failed prava/lipitor/crestor. Tolerates livalo    SVT (supraventricular tachycardia) 4/24/2017 6/2017 EPS 1.  Normal baseline intervals. 2.  No evidence of an accessory pathway. 3.  Atrial tachycardia mapped to the posterior aspect near the IVC, status post radiofrequency ablation.     Past Surgical History:   Procedure Laterality Date    ACHILLES TENDON SURGERY Left     torn tendon    CARDIAC SURGERY      x 4--22 years ago    cataract surgery      CHOLECYSTECTOMY      cholescystectomy      COLONOSCOPY  7/20/2006  [facility]    One 1 to 2 mm polyp in the proximal descending colon.  HYPERPLASTIC POLYP.     Internal hemorrhoids.    CORONARY ANGIOGRAPHY N/A 10/1/2020    Procedure: ANGIOGRAM, CORONARY ARTERY;  Surgeon: Jun Thompson MD;  [facility];  Service: Cardiology;  Laterality: N/A;    CORONARY BYPASS GRAFT ANGIOGRAPHY  10/1/2020    Procedure: Bypass graft study;  Surgeon: Jun Thompson MD;  Location: [facility] CATH;  Service: Cardiology;;    CORONARY STENT PLACEMENT      2009 X 1; 2014 X 1    EPIDURAL STEROID INJECTION INTO LUMBAR SPINE N/A 5/19/2020    Procedure: Injection-steroid-epidural-lumbar L5/S1;  Surgeon: Mannie Montoya MD;  Location: [facility] OR;  Service: Pain Management;  Laterality: N/A;    EPIDURAL STEROID INJECTION INTO LUMBAR SPINE N/A 8/27/2020    Procedure: Injection-steroid-epidural-lumbar, L3/4;  Surgeon: Mannie Montoya MD;  Location: [facility] OR;  Service: Pain Management;  Laterality: N/A;    HYSTERECTOMY      INJECTION OF ANESTHETIC AGENT AROUND MEDIAL BRANCH NERVES INNERVATING LUMBAR FACET JOINT Bilateral 6/25/2020    Procedure: Block-nerve-medial branch-lumbar L3/4, L4/5, L5/S1;  Surgeon: Mannie Montoya MD;  Location: [facility] OR;  Service: Pain Management;  Laterality: Bilateral;    Right kidney removed Right 05/05/2021     Family History   Problem Relation Age of Onset    Hypertension Sister     Heart disease Brother     Hypertension Brother     Coronary artery disease Brother     Obesity Brother     Hypertension Maternal Grandmother     Heart disease Maternal Grandmother     Heart attack Maternal Grandmother     Hypertension Maternal Grandfather     Heart disease Maternal Grandfather     Breast cancer Neg Hx     Ovarian cancer Neg Hx      Social History     Tobacco Use    Smoking status: Never Smoker    Smokeless tobacco: Never Used   Substance Use Topics    Alcohol use: Yes    Drug use: No         OBJECTIVE:     Vital Signs (Most Recent)  Temp: 97.8 °F (36.6 °C) (04/14/22 1116)  Pulse: 64 (04/14/22 1116)  Resp: 15 (04/14/22 1116)  BP: (!) 178/75  "(04/14/22 1116)  SpO2: 95 % (04/14/22 1116)    Physical Exam:  :Ht: 5' 6" (167.6 cm)   Wt: 88.9 kg (196 lb)   BMI: 31.64 kg/m²                                                          GENERAL:  Comfortable, in no acute distress.                                 HEENT EXAM:  Nonicteric.  No adenopathy.  Oropharynx is clear.               NECK:  Supple.                                                               LUNGS:  Clear.                                                               CARDIAC:  Regular rate and rhythm.  S1, S2.  No murmur.                      ABDOMEN:  Obese.   Soft, positive bowel sounds, nontender.  No hepatosplenomegaly or masses.  No rebound or guarding.                                             EXTREMITIES:  No edema.     MENTAL STATUS:  Alert and oriented.    ASSESSMENT/PLAN:     Assessment: Surveillance, Hx of colon polyps    Plan: Colonoscopy    Anesthesia Plan:   MAC / General Anaesthesia    ASA Grade: ASA 2 - Patient with mild systemic disease with no functional limitations    MALLAMPATI SCORE: II (hard and soft palate, upper portion of tonsils anduvula visible)    "

## 2022-04-14 ENCOUNTER — ANESTHESIA (OUTPATIENT)
Dept: ENDOSCOPY | Facility: HOSPITAL | Age: 77
End: 2022-04-14
Payer: MEDICARE

## 2022-04-14 ENCOUNTER — ANESTHESIA EVENT (OUTPATIENT)
Dept: ENDOSCOPY | Facility: HOSPITAL | Age: 77
End: 2022-04-14
Payer: MEDICARE

## 2022-04-14 ENCOUNTER — HOSPITAL ENCOUNTER (OUTPATIENT)
Facility: HOSPITAL | Age: 77
Discharge: HOME | End: 2022-04-14
Attending: INTERNAL MEDICINE | Admitting: INTERNAL MEDICINE
Payer: MEDICARE

## 2022-04-14 DIAGNOSIS — Z86.010 HX OF COLONIC POLYPS: ICD-10-CM

## 2022-04-14 PROBLEM — Z86.0100 PERSONAL HISTORY OF COLONIC POLYPS: Status: ACTIVE | Noted: 2022-04-14

## 2022-04-14 LAB — GLUCOSE SERPL-MCNC: 92 MG/DL (ref 70–110)

## 2022-04-14 PROCEDURE — 63600175 PHARM REV CODE 636 W HCPCS: Mod: PO | Performed by: NURSE ANESTHETIST, CERTIFIED REGISTERED

## 2022-04-14 PROCEDURE — 88341 IMHCHEM/IMCYTCHM EA ADD ANTB: CPT | Performed by: PATHOLOGY

## 2022-04-14 PROCEDURE — D9220A PRA ANESTHESIA: Mod: PT,ANES,, | Performed by: ANESTHESIOLOGY

## 2022-04-14 PROCEDURE — 27201089 HC SNARE, DISP (ANY): Mod: PO | Performed by: INTERNAL MEDICINE

## 2022-04-14 PROCEDURE — 88341 PR IHC OR ICC EACH ADD'L SINGLE ANTIBODY  STAINPR: ICD-10-PCS | Mod: 26,,, | Performed by: PATHOLOGY

## 2022-04-14 PROCEDURE — 63600175 PHARM REV CODE 636 W HCPCS: Mod: PO | Performed by: INTERNAL MEDICINE

## 2022-04-14 PROCEDURE — 45385 COLONOSCOPY W/LESION REMOVAL: CPT | Mod: PO | Performed by: INTERNAL MEDICINE

## 2022-04-14 PROCEDURE — 88342 IMHCHEM/IMCYTCHM 1ST ANTB: CPT | Mod: 26,,, | Performed by: PATHOLOGY

## 2022-04-14 PROCEDURE — 88305 TISSUE EXAM BY PATHOLOGIST: CPT | Performed by: PATHOLOGY

## 2022-04-14 PROCEDURE — 45385 COLONOSCOPY W/LESION REMOVAL: CPT | Mod: PT,,, | Performed by: INTERNAL MEDICINE

## 2022-04-14 PROCEDURE — D9220A PRA ANESTHESIA: ICD-10-PCS | Mod: PT,ANES,, | Performed by: ANESTHESIOLOGY

## 2022-04-14 PROCEDURE — D9220A PRA ANESTHESIA: ICD-10-PCS | Mod: PT,CRNA,, | Performed by: NURSE ANESTHETIST, CERTIFIED REGISTERED

## 2022-04-14 PROCEDURE — 37000008 HC ANESTHESIA 1ST 15 MINUTES: Mod: PO | Performed by: INTERNAL MEDICINE

## 2022-04-14 PROCEDURE — 88341 IMHCHEM/IMCYTCHM EA ADD ANTB: CPT | Mod: 26,,, | Performed by: PATHOLOGY

## 2022-04-14 PROCEDURE — 25000003 PHARM REV CODE 250: Mod: PO | Performed by: NURSE ANESTHETIST, CERTIFIED REGISTERED

## 2022-04-14 PROCEDURE — 37000009 HC ANESTHESIA EA ADD 15 MINS: Mod: PO | Performed by: INTERNAL MEDICINE

## 2022-04-14 PROCEDURE — D9220A PRA ANESTHESIA: Mod: PT,CRNA,, | Performed by: NURSE ANESTHETIST, CERTIFIED REGISTERED

## 2022-04-14 PROCEDURE — 88342 IMHCHEM/IMCYTCHM 1ST ANTB: CPT | Performed by: PATHOLOGY

## 2022-04-14 PROCEDURE — 82962 GLUCOSE BLOOD TEST: CPT | Mod: PO | Performed by: INTERNAL MEDICINE

## 2022-04-14 PROCEDURE — 88342 CHG IMMUNOCYTOCHEMISTRY: ICD-10-PCS | Mod: 26,,, | Performed by: PATHOLOGY

## 2022-04-14 PROCEDURE — 88305 TISSUE EXAM BY PATHOLOGIST: CPT | Mod: 26,,, | Performed by: PATHOLOGY

## 2022-04-14 PROCEDURE — 88305 TISSUE EXAM BY PATHOLOGIST: ICD-10-PCS | Mod: 26,,, | Performed by: PATHOLOGY

## 2022-04-14 PROCEDURE — 45385 PR COLONOSCOPY,REMV LESN,SNARE: ICD-10-PCS | Mod: PT,,, | Performed by: INTERNAL MEDICINE

## 2022-04-14 RX ORDER — PROPOFOL 10 MG/ML
VIAL (ML) INTRAVENOUS CONTINUOUS PRN
Status: DISCONTINUED | OUTPATIENT
Start: 2022-04-14 | End: 2022-04-14

## 2022-04-14 RX ORDER — SODIUM CHLORIDE 0.9 % (FLUSH) 0.9 %
10 SYRINGE (ML) INJECTION
Status: DISCONTINUED | OUTPATIENT
Start: 2022-04-14 | End: 2022-04-14 | Stop reason: HOSPADM

## 2022-04-14 RX ORDER — PROPOFOL 10 MG/ML
VIAL (ML) INTRAVENOUS
Status: DISCONTINUED | OUTPATIENT
Start: 2022-04-14 | End: 2022-04-14

## 2022-04-14 RX ORDER — SODIUM CHLORIDE, SODIUM LACTATE, POTASSIUM CHLORIDE, CALCIUM CHLORIDE 600; 310; 30; 20 MG/100ML; MG/100ML; MG/100ML; MG/100ML
INJECTION, SOLUTION INTRAVENOUS CONTINUOUS
Status: DISCONTINUED | OUTPATIENT
Start: 2022-04-14 | End: 2022-04-14 | Stop reason: HOSPADM

## 2022-04-14 RX ORDER — LIDOCAINE HCL/PF 100 MG/5ML
SYRINGE (ML) INTRAVENOUS
Status: DISCONTINUED | OUTPATIENT
Start: 2022-04-14 | End: 2022-04-14

## 2022-04-14 RX ADMIN — PROPOFOL 30 MG: 10 INJECTION, EMULSION INTRAVENOUS at 11:04

## 2022-04-14 RX ADMIN — SODIUM CHLORIDE, SODIUM LACTATE, POTASSIUM CHLORIDE, AND CALCIUM CHLORIDE: .6; .31; .03; .02 INJECTION, SOLUTION INTRAVENOUS at 11:04

## 2022-04-14 RX ADMIN — PROPOFOL 40 MG: 10 INJECTION, EMULSION INTRAVENOUS at 11:04

## 2022-04-14 RX ADMIN — PROPOFOL 100 MG: 10 INJECTION, EMULSION INTRAVENOUS at 11:04

## 2022-04-14 RX ADMIN — LIDOCAINE HYDROCHLORIDE 75 MG: 20 INJECTION, SOLUTION INTRAVENOUS at 11:04

## 2022-04-14 RX ADMIN — PROPOFOL 150 MCG/KG/MIN: 10 INJECTION, EMULSION INTRAVENOUS at 12:04

## 2022-04-14 NOTE — [employer] PATIENT INSTRUCTIONS
Discharge Summary/Instructions for after Colonoscopy with   Biopsy/Polypectomy  Mayra Pinedo    Thursday, April 14, 2022  Amaury Ornelas MD  RESTRICTIONS ON ACTIVITY:  - Do not drive a car or operate machinery until the day after the procedure.      - The following day: return to full activity including work.  - For  3 days: No heavy lifting, straining or running.  - Diet: You can have solid foods, but no gassy foods (i.e. beans, broccoli,   cabbage, etc).  TREATMENT FOR COMMON SIDE EFFECTS:  - Mild abdominal pain and bloating or excessive gas: rest, eat lightly and   use a heating pad.  SYMPTOMS TO WATCH FOR AND REPORT TO YOUR PHYSICIAN:  1. Severe abdominal pain.  2. Fever within 24 hours after a procedure.  3. A large amount of rectal bleeding. (A small amount of blood from the   rectum is not serious, especially if hemorrhoids are present.  3.  Because air was put into your colon during the procedure, expelling   large amounts of air from your rectum is normal.  4.  You may not have a bowel movement for 1-3 days because of the   colonoscopy prep.  This is normal.  5.  Call immediately if you notice any of the following:   Chills and/or fever over 101   Persistent vomiting   Severe abdominal pain, other than gas cramps   Severe chest pain   Black, tarry stools   Any bleeding - exceeding one tablespoon  Your doctor recommends these additional instructions:  We are waiting for your pathology results.   If the pathology report reveals adenomatous (precancerous) tissue, then your   physician recommends a repeat colonoscopy for surveillance in 5 years.   If the pathology report indicates a hyperplastic polyp, then the colonoscopy   will be repeated for surveillance in 7-8 years.   Eat a high fiber diet.   Take a fiber supplement, for example Citrucel, Fibercon, Konsyl or   Metamucil.   Take Miralax 1 capful (17 grams) in 8 ounces of water by mouth once a day as   needed.   Take a PROBIOTIC, such as a carton  of Hungarian YOGURT (Chobani or Oikos,  or   Activia or Dannon);  or tablets of ALIGN or CULTURELLE or NIRANJAN-Q (all   non-prescription), every day for a month.   And repeat this at least 5-6   times a year.  Call the G.I. clinic in 2 weeks for reports (if you haven't heard from us   sooner)  [phone].  None  If you have any questions or problems, please call your physician.  EMERGENCY PHONE NUMBER: [phone]  LAB RESULTS: Call in two (2) weeks for lab results, [phone]  ___________________________________________  Nurse Signature  ___________________________________________  Patient/Designated Responsible Party Signature  Amaury Ornelas MD  4/14/2022 12:38:53 PM  This report has been verified and signed electronically.  Dear patient,  As a result of recent federal legislation (The Federal Cures Act), you may   receive lab or pathology results from your procedure in your [facility]   account before your physician is able to contact you. Your physician or   their representative will relay the results to you with their   recommendations at their soonest availability.  Thank you.  [employer]

## 2022-04-14 NOTE — PATIENT INSTRUCTIONS
Recovery After Procedural Sedation (Adult)   You have been given medicine by vein to make you sleep during your surgery. This may have included both a pain medicine and sleeping medicine. Most of the effects have worn off. But you may still have some drowsiness for the next 6 to 8 hours.  Home care  Follow these guidelines when you get home:  For the next 8 hours, you should be watched by a responsible adult. This person should make sure your condition is not getting worse.  Don't drink any alcohol for the next 24 hours.  Don't drive, operate dangerous machinery, or make important business or personal decisions during the next 24 hours.  To prevent injury or falls, use caution when standing and walking for at least 24 hours after your procedure.  Note: Your healthcare provider may tell you not to take any medicine by mouth for pain or sleep in the next 4 hours. These medicines may react with the medicines you were given in the hospital. This could cause a much stronger response than usual.  Follow-up care  Follow up with your healthcare provider if you are not alert and back to your usual level of activity within 12 hours.  When to seek medical advice  Call your healthcare provider right away if any of these occur:  Drowsiness gets worse  Weakness or dizziness gets worse  Repeated vomiting  You can't be awakened  Fever  New rash  [employer] last reviewed this educational content on 9/1/2019  © 7969-5417 [employer]. [address] [place] [address], [place] [id number]. All rights reserved. This information is not intended as a substitute for professional medical care. Always follow your healthcare professional's instructions       High-Fiber Diet  Fiber is in fruits, vegetables, cereals, and grains. Fiber passes through your body undigested. A high-fiber diet helps food move through your intestinal tract. The added bulk is helpful in preventing constipation. In people with diverticulosis, fiber helps clean out the  pouches along the colon wall. It also prevents new pouches from forming. A high-fiber diet reduces the risk of colon cancer. It also lowers blood cholesterol and prevents high blood sugar in people with diabetes.    The fiber-rich foods listed below should be part of your diet. If you are not used to high-fiber foods, start with 1 or 2 foods from this list. Every 3 to 4 days add a new one to your diet. Do this until you are eating 4 high-fiber foods per day. This should give you 20 to 35 grams of fiber a day. It is also important to drink a lot of water when you are on this diet. You should have 6 to 8 glasses of water a day. Water makes the fiber swell and increases the benefit.  Foods high in dietary fiber  The following foods are high in dietary fiber:  Breads. Breads made with 100% whole-wheat flour; marilu, wheat, or rye crackers; whole-grain tortillas, bran muffins.  Cereals. Whole-grain and bran cereals with bran (shredded wheat, wheat flakes, raisin bran, corn bran); oatmeal, rolled oats, granola, and brown rice.  Fruits. Fresh fruits and their edible skins (pears, prunes, raisins, berries, apples, and apricots); bananas, citrus fruit, mangoes, pineapple; and prune juice.  Nuts. Any nuts and seeds.  Vegetables. Best served raw or lightly cooked. All types, especially: green peas, celery, eggplant, potatoes, spinach, broccoli, Fr[place]ham sprouts, winter squash, carrots, cauliflower, soybeans, lentils, and fresh and dried beans of all kinds.  Other. Popcorn, any spices.  Date Last Reviewed: 8/1/2016  © 8138-9808 [employer]. [address] [place] [address], [place] [address]. All rights reserved. This information is not intended as a substitute for professional medical care. Always follow your healthcare professional's instructions.

## 2022-04-14 NOTE — ANESTHESIA PREPROCEDURE EVALUATION
04/14/2022  Mayra Pinedo is a 76 y.o., female.      Pre-op Assessment    I have reviewed the Patient Summary Reports.     I have reviewed the Nursing Notes. I have reviewed the NPO Status.   I have reviewed the Medications.     Review of Systems  Anesthesia Hx:  No problems with previous Anesthesia    Social:  Non-Smoker    Cardiovascular:   Hypertension CAD  CABG/stent     Pulmonary:   Asthma    Hepatic/GI:   Bowel Prep. GERD    Musculoskeletal:   Arthritis     Neurological:   Neuromuscular Disease,    Endocrine:   Diabetes  Obesity / BMI > 30      Physical Exam  General: Well nourished, Cooperative, Alert and Oriented    Airway:  Mallampati: III   Mouth Opening: Small, but > 3cm  TM Distance: Normal  Tongue: Normal  Neck ROM: Normal ROM    Dental:  Intact        Anesthesia Plan  Type of Anesthesia, risks & benefits discussed:    Anesthesia Type: Gen Natural Airway  Intra-op Monitoring Plan: Standard ASA Monitors  Induction:  IV  Informed Consent: Informed consent signed with the Patient and all parties understand the risks and agree with anesthesia plan.  All questions answered.   ASA Score: 3  Day of Surgery Review of History & Physical: H&P Update referred to the surgeon/provider.    Ready For Surgery From Anesthesia Perspective.     .

## 2022-04-14 NOTE — TRANSFER OF CARE
"Anesthesia Transfer of Care Note    Patient: Mayra Pinedo    Procedure(s) Performed: Procedure(s) (LRB):  COLONOSCOPY (N/A)    Patient location: PACU    Anesthesia Type: general    Transport from OR: Transported from OR on 2-3 L/min O2 by NC with adequate spontaneous ventilation    Post pain: adequate analgesia    Post assessment: no apparent anesthetic complications and tolerated procedure well    Post vital signs: stable    Level of consciousness: responds to stimulation    Nausea/Vomiting: no nausea/vomiting    Complications: none    Transfer of care protocol was followed      Last vitals:   Visit Vitals  BP (!) 178/75   Pulse 64   Temp 36.6 °C (97.8 °F)   Resp 15   Ht 5' 6" (1.676 m)   Wt 88.9 kg (196 lb)   SpO2 95%   Breastfeeding No   BMI 31.64 kg/m²     "

## 2022-04-14 NOTE — BRIEF OP NOTE
Discharge Note  Short Stay      SUMMARY     Admit Date: 4/14/2022    Attending Physician: Amaury Ornelas Jr., MD     Discharge Physician: Amaury Ornelas Jr., MD    Discharge Date: 4/14/2022 12:40 PM    Final Diagnosis: Screening for colon cancer [Z12.11]  Personal history of colonic polyps [Z86.010]    Impression:            - One 2 to 3 mm polyp in the proximal descending                          colon, removed with a cold snare. Resected and                          retrieved.                          - Diverticulosis in the sigmoid colon.                          - Tortuous colon.                          - Non-bleeding internal hemorrhoids.                          - Redundant colon.                          - The examination was otherwise normal.                          - The examined portion of the ileum was normal.                          .   Recommendation:        - Discharge patient to home.                          - Await pathology results.                          - If the pathology report reveals adenomatous                          tissue, then repeat the colonoscopy for                          surveillance in 5 years.                          - If the pathology report indicates hyperplastic                          polyp, then repeat colonoscopy for surveillance in                          7-8 years.                          - High fiber diet.                          - Use fiber, for example Citrucel, Fibercon,                          Konsyl or Metamucil.                          - Take a PROBIOTIC, such as a carton of GREEK                          YOGURT (Chobani or Oikos, or Activia or Dannon);                          or tablets of ALIGN or CULTURELLE or NIRANJAN-Q (all                          non-prescription), every day for a month.                          - Miralax 1 capful (17 grams) in 8 ounces of water                          PO daily PRN.                          - Call the [facility] in 2 weeks for reports (if                          you haven't heard from us sooner) [phone].                          - Continue present medications.                          - Patient has a contact number available for                          emergencies. The signs and symptoms of potential                          delayed complications were discussed with the                          patient. Return to normal activities tomorrow.                          Written discharge instructions were provided to                          the patient.                          - Return to normal activities tomorrow.   Amaury Ornelas MD   4/14/2022       Disposition: HOME OR SELF CARE    Patient Instructions:   Current Discharge Medication List      CONTINUE these medications which have NOT CHANGED    Details   ALBUTEROL SULFATE (VENTOLIN HFA INHL) Inhale 2 Inhalers into the lungs as needed.       aspirin (ECOTRIN) 81 MG EC tablet Take 81 mg by mouth once daily.      carvediloL (COREG) 25 MG tablet Take 1 tablet by mouth twice daily  Qty: 180 tablet, Refills: 0    Associated Diagnoses: S/P CABG (coronary artery bypass graft); S/P coronary artery stent placement; Coronary artery disease involving native coronary artery of native heart without angina pectoris; Essential hypertension; Dyslipidemia      cetirizine (ZYRTEC) 10 MG tablet Take 1 tablet by mouth every evening.       clopidogreL (PLAVIX) 75 mg tablet Take 1 tablet by mouth once daily  Qty: 90 tablet, Refills: 4    Associated Diagnoses: Essential hypertension; Dyslipidemia; S/P coronary artery stent placement; Coronary artery disease involving native coronary artery of native heart without angina pectoris; Type 2 diabetes mellitus without complication      diclofenac sodium 1 % Gel 4 g.      ezetimibe (ZETIA) 10 mg tablet Take 1 tablet by mouth once daily  Qty: 90 tablet, Refills: 0    Associated Diagnoses: S/P CABG (coronary artery bypass graft); S/P  coronary artery stent placement; Statin intolerance; Coronary artery disease involving native coronary artery of native heart without angina pectoris; Dyslipidemia      famotidine (PEPCID) 20 MG tablet Take 20 mg by mouth 2 (two) times daily.      !! fenofibrate (TRICOR) 54 MG tablet Take 1 tablet by mouth once daily  Qty: 90 tablet, Refills: 4      !! fenofibrate 160 MG Tab Take 1 tablet (160 mg total) by mouth once daily.  Qty: 90 tablet, Refills: 3      isosorbide mononitrate (IMDUR) 60 MG 24 hr tablet Take 1 tablet (60 mg total) by mouth every evening.  Qty: 90 tablet, Refills: 4    Associated Diagnoses: Essential hypertension; Dyslipidemia; S/P coronary artery stent placement; Coronary artery disease involving native coronary artery of native heart without angina pectoris; Type 2 diabetes mellitus without complication      !! LIVALO 4 mg Tab Take 1 tablet by mouth once daily  Qty: 30 tablet, Refills: 0      metFORMIN (GLUCOPHAGE) 500 MG tablet Take 500 mg by mouth once daily.      omega-3 fatty acids-vitamin E 1,000 mg Cap Take 2,400 mg by mouth 2 (two) times daily.       !! pitavastatin calcium (LIVALO) 2 mg Tab tablet Take 1 tablet (2 mg total) by mouth every evening.  Qty: 90 tablet, Refills: 3    Associated Diagnoses: S/P CABG (coronary artery bypass graft); S/P coronary artery stent placement; Statin intolerance; Coronary artery disease involving native coronary artery of native heart without angina pectoris; Dyslipidemia      triamcinolone acetonide 0.025% (KENALOG) 0.025 % cream Apply topically 2 (two) times daily. Apply for 2 weeks, then take a break for 2 weeks. Repeat as needed.  Qty: 15 g, Refills: 1      valsartan (DIOVAN) 160 MG tablet Take 1 tablet (160 mg total) by mouth 2 (two) times daily.  Qty: 180 tablet, Refills: 3    Comments: .      conjugated estrogens (PREMARIN) vaginal cream Place 1 g vaginally once daily.  Qty: 42.5 g, Refills: 1      nitroGLYCERIN (NITROSTAT) 0.4 MG SL tablet Place 1  tablet (0.4 mg total) under the tongue every 5 (five) minutes as needed for Chest pain.  Qty: 25 tablet, Refills: 4    Associated Diagnoses: S/P coronary artery stent placement       !! - Potential duplicate medications found. Please discuss with provider.          Discharge Procedure Orders (must include Diet, Follow-up, Activity)    Follow Up:  Follow up with PCP as per your routine.  Please follow a high fiber diet.  Activity as tolerated.    No driving day of procedure.

## 2022-04-18 VITALS
TEMPERATURE: 97 F | RESPIRATION RATE: 16 BRPM | DIASTOLIC BLOOD PRESSURE: 60 MMHG | HEART RATE: 70 BPM | BODY MASS INDEX: 31.5 KG/M2 | HEIGHT: 66 IN | WEIGHT: 196 LBS | OXYGEN SATURATION: 97 % | SYSTOLIC BLOOD PRESSURE: 130 MMHG

## 2022-04-29 ENCOUNTER — PATIENT MESSAGE (OUTPATIENT)
Dept: CARDIOLOGY | Facility: CLINIC | Age: 77
End: 2022-04-29
Payer: MEDICARE

## 2022-05-02 ENCOUNTER — PATIENT MESSAGE (OUTPATIENT)
Dept: CARDIOLOGY | Facility: CLINIC | Age: 77
End: 2022-05-02
Payer: MEDICARE

## 2022-05-02 RX ORDER — PITAVASTATIN CALCIUM 4.18 MG/1
1 TABLET, FILM COATED ORAL DAILY
Qty: 90 TABLET | Refills: 4 | Status: SHIPPED | OUTPATIENT
Start: 2022-05-02 | End: 2022-05-30

## 2022-05-02 NOTE — TELEPHONE ENCOUNTER
Spoke with patient informed prescription was in process of being filled. Patient verbally understood.

## 2022-05-04 ENCOUNTER — TELEPHONE (OUTPATIENT)
Dept: GASTROENTEROLOGY | Facility: CLINIC | Age: 77
End: 2022-05-04
Payer: MEDICARE

## 2022-05-04 ENCOUNTER — IMMUNIZATION (OUTPATIENT)
Dept: FAMILY MEDICINE | Facility: CLINIC | Age: 77
End: 2022-05-04
Payer: MEDICARE

## 2022-05-04 DIAGNOSIS — Z23 NEED FOR VACCINATION: Primary | ICD-10-CM

## 2022-05-04 LAB
FINAL PATHOLOGIC DIAGNOSIS: NORMAL
Lab: NORMAL

## 2022-05-04 PROCEDURE — 91305 COVID-19, MRNA, LNP-S, PF, 30 MCG/0.3 ML DOSE VACCINE (PFIZER): CPT | Mod: PBBFAC | Performed by: FAMILY MEDICINE

## 2022-05-04 NOTE — TELEPHONE ENCOUNTER
Called and spoke with Betsey in Pathology [facility] who states [facility] when a lot of specimens are received at [facility] and she will check  To see what is going on with the specimen, Betsey states that the result is finalized however has not yet been signed off on by a pathologist so that it can be completed in [facility], Betsey states that she will give it to a pathologist today.

## 2022-05-06 ENCOUNTER — TELEPHONE (OUTPATIENT)
Dept: GASTROENTEROLOGY | Facility: CLINIC | Age: 77
End: 2022-05-06
Payer: MEDICARE

## 2022-05-19 ENCOUNTER — LAB VISIT (OUTPATIENT)
Dept: LAB | Facility: HOSPITAL | Age: 77
End: 2022-05-19
Attending: INTERNAL MEDICINE
Payer: MEDICARE

## 2022-05-19 ENCOUNTER — CLINICAL SUPPORT (OUTPATIENT)
Dept: CARDIOLOGY | Facility: HOSPITAL | Age: 77
End: 2022-05-19
Attending: INTERNAL MEDICINE
Payer: MEDICARE

## 2022-05-19 VITALS
SYSTOLIC BLOOD PRESSURE: 130 MMHG | BODY MASS INDEX: 31.5 KG/M2 | DIASTOLIC BLOOD PRESSURE: 60 MMHG | WEIGHT: 196 LBS | HEIGHT: 66 IN

## 2022-05-19 DIAGNOSIS — Z78.9 STATIN INTOLERANCE: ICD-10-CM

## 2022-05-19 DIAGNOSIS — Z95.1 S/P CABG (CORONARY ARTERY BYPASS GRAFT): ICD-10-CM

## 2022-05-19 DIAGNOSIS — I47.10 SVT (SUPRAVENTRICULAR TACHYCARDIA): ICD-10-CM

## 2022-05-19 DIAGNOSIS — I25.10 CORONARY ARTERY DISEASE INVOLVING NATIVE CORONARY ARTERY OF NATIVE HEART WITHOUT ANGINA PECTORIS: ICD-10-CM

## 2022-05-19 DIAGNOSIS — Z95.5 S/P CORONARY ARTERY STENT PLACEMENT: Chronic | ICD-10-CM

## 2022-05-19 DIAGNOSIS — I10 ESSENTIAL HYPERTENSION: ICD-10-CM

## 2022-05-19 LAB
ALBUMIN SERPL BCP-MCNC: 3.6 G/DL (ref 3.5–5.2)
ALP SERPL-CCNC: 37 U/L (ref 55–135)
ALT SERPL W/O P-5'-P-CCNC: 13 U/L (ref 10–44)
ANION GAP SERPL CALC-SCNC: 11 MMOL/L (ref 8–16)
AST SERPL-CCNC: 26 U/L (ref 10–40)
BASOPHILS # BLD AUTO: 0.05 K/UL (ref 0–0.2)
BASOPHILS NFR BLD: 0.9 % (ref 0–1.9)
BILIRUB SERPL-MCNC: 0.4 MG/DL (ref 0.1–1)
BUN SERPL-MCNC: 16 MG/DL (ref 8–23)
CALCIUM SERPL-MCNC: 9.7 MG/DL (ref 8.7–10.5)
CHLORIDE SERPL-SCNC: 105 MMOL/L (ref 95–110)
CHOLEST SERPL-MCNC: 101 MG/DL (ref 120–199)
CHOLEST/HDLC SERPL: 3.9 {RATIO} (ref 2–5)
CO2 SERPL-SCNC: 21 MMOL/L (ref 23–29)
CREAT SERPL-MCNC: 1.5 MG/DL (ref 0.5–1.4)
DIFFERENTIAL METHOD: ABNORMAL
EOSINOPHIL # BLD AUTO: 0.4 K/UL (ref 0–0.5)
EOSINOPHIL NFR BLD: 6.6 % (ref 0–8)
ERYTHROCYTE [DISTWIDTH] IN BLOOD BY AUTOMATED COUNT: 14.6 % (ref 11.5–14.5)
EST. GFR  (AFRICAN AMERICAN): 39.3 ML/MIN/1.73 M^2
EST. GFR  (NON AFRICAN AMERICAN): 34.1 ML/MIN/1.73 M^2
GLUCOSE SERPL-MCNC: 126 MG/DL (ref 70–110)
HCT VFR BLD AUTO: 33.3 % (ref 37–48.5)
HDLC SERPL-MCNC: 26 MG/DL (ref 40–75)
HDLC SERPL: 25.7 % (ref 20–50)
HGB BLD-MCNC: 10.9 G/DL (ref 12–16)
IMM GRANULOCYTES # BLD AUTO: 0.02 K/UL (ref 0–0.04)
IMM GRANULOCYTES NFR BLD AUTO: 0.4 % (ref 0–0.5)
LDLC SERPL CALC-MCNC: 7.4 MG/DL (ref 63–159)
LYMPHOCYTES # BLD AUTO: 1.5 K/UL (ref 1–4.8)
LYMPHOCYTES NFR BLD: 28.2 % (ref 18–48)
MCH RBC QN AUTO: 30.4 PG (ref 27–31)
MCHC RBC AUTO-ENTMCNC: 32.7 G/DL (ref 32–36)
MCV RBC AUTO: 93 FL (ref 82–98)
MONOCYTES # BLD AUTO: 0.6 K/UL (ref 0.3–1)
MONOCYTES NFR BLD: 10.7 % (ref 4–15)
NEUTROPHILS # BLD AUTO: 2.8 K/UL (ref 1.8–7.7)
NEUTROPHILS NFR BLD: 53.2 % (ref 38–73)
NONHDLC SERPL-MCNC: 75 MG/DL
NRBC BLD-RTO: 0 /100 WBC
PLATELET # BLD AUTO: 196 K/UL (ref 150–450)
PMV BLD AUTO: 10.9 FL (ref 9.2–12.9)
POTASSIUM SERPL-SCNC: 4.7 MMOL/L (ref 3.5–5.1)
PROT SERPL-MCNC: 7.4 G/DL (ref 6–8.4)
RBC # BLD AUTO: 3.59 M/UL (ref 4–5.4)
SODIUM SERPL-SCNC: 137 MMOL/L (ref 136–145)
T4 FREE SERPL-MCNC: 0.7 NG/DL (ref 0.71–1.51)
TRIGL SERPL-MCNC: 338 MG/DL (ref 30–150)
TSH SERPL DL<=0.005 MIU/L-ACNC: 4.06 UIU/ML (ref 0.4–4)
WBC # BLD AUTO: 5.32 K/UL (ref 3.9–12.7)

## 2022-05-19 PROCEDURE — 80053 COMPREHEN METABOLIC PANEL: CPT | Performed by: INTERNAL MEDICINE

## 2022-05-19 PROCEDURE — 93306 ECHO (CUPID ONLY): ICD-10-PCS | Mod: 26,,, | Performed by: INTERNAL MEDICINE

## 2022-05-19 PROCEDURE — 84439 ASSAY OF FREE THYROXINE: CPT | Performed by: INTERNAL MEDICINE

## 2022-05-19 PROCEDURE — 84443 ASSAY THYROID STIM HORMONE: CPT | Performed by: INTERNAL MEDICINE

## 2022-05-19 PROCEDURE — 36415 COLL VENOUS BLD VENIPUNCTURE: CPT | Mod: PO | Performed by: INTERNAL MEDICINE

## 2022-05-19 PROCEDURE — 93306 TTE W/DOPPLER COMPLETE: CPT | Mod: PO

## 2022-05-19 PROCEDURE — 80061 LIPID PANEL: CPT | Performed by: INTERNAL MEDICINE

## 2022-05-19 PROCEDURE — 85025 COMPLETE CBC W/AUTO DIFF WBC: CPT | Performed by: INTERNAL MEDICINE

## 2022-05-19 PROCEDURE — 93306 TTE W/DOPPLER COMPLETE: CPT | Mod: 26,,, | Performed by: INTERNAL MEDICINE

## 2022-05-20 LAB
ASCENDING AORTA: 2.91 CM
AV INDEX (PROSTH): 0.69
AV MEAN GRADIENT: 6 MMHG
AV PEAK GRADIENT: 10 MMHG
AV VALVE AREA: 2.13 CM2
AV VELOCITY RATIO: 0.61
BSA FOR ECHO PROCEDURE: 2.03 M2
CV ECHO LV RWT: 0.44 CM
DOP CALC AO PEAK VEL: 1.6 M/S
DOP CALC AO VTI: 36.83 CM
DOP CALC LVOT AREA: 3.1 CM2
DOP CALC LVOT DIAMETER: 1.99 CM
DOP CALC LVOT PEAK VEL: 0.97 M/S
DOP CALC LVOT STROKE VOLUME: 78.49 CM3
DOP CALCLVOT PEAK VEL VTI: 25.25 CM
E WAVE DECELERATION TIME: 168.74 MSEC
E/A RATIO: 1.34
E/E' RATIO: 23.56 M/S
ECHO LV POSTERIOR WALL: 1.04 CM (ref 0.6–1.1)
EJECTION FRACTION: 60 %
FRACTIONAL SHORTENING: 35 % (ref 28–44)
INTERVENTRICULAR SEPTUM: 0.9 CM (ref 0.6–1.1)
LA MAJOR: 4.88 CM
LA MINOR: 4.62 CM
LA WIDTH: 4.21 CM
LEFT ATRIUM SIZE: 4.57 CM
LEFT ATRIUM VOLUME INDEX: 39.2 ML/M2
LEFT ATRIUM VOLUME: 77.62 CM3
LEFT INTERNAL DIMENSION IN SYSTOLE: 3.1 CM (ref 2.1–4)
LEFT VENTRICLE DIASTOLIC VOLUME INDEX: 53.9 ML/M2
LEFT VENTRICLE DIASTOLIC VOLUME: 106.73 ML
LEFT VENTRICLE MASS INDEX: 82 G/M2
LEFT VENTRICLE SYSTOLIC VOLUME INDEX: 19.1 ML/M2
LEFT VENTRICLE SYSTOLIC VOLUME: 37.89 ML
LEFT VENTRICULAR INTERNAL DIMENSION IN DIASTOLE: 4.78 CM (ref 3.5–6)
LEFT VENTRICULAR MASS: 162.22 G
LV LATERAL E/E' RATIO: 21.2 M/S
LV SEPTAL E/E' RATIO: 26.5 M/S
MV A" WAVE DURATION": 9.71 MSEC
MV PEAK A VEL: 0.79 M/S
MV PEAK E VEL: 1.06 M/S
MV STENOSIS PRESSURE HALF TIME: 48.93 MS
MV VALVE AREA P 1/2 METHOD: 4.5 CM2
PISA TR MAX VEL: 2.79 M/S
PULM VEIN S/D RATIO: 0.51
PV PEAK D VEL: 0.67 M/S
PV PEAK S VEL: 0.34 M/S
RA MAJOR: 4.21 CM
RA PRESSURE: 3 MMHG
RA WIDTH: 2.81 CM
RIGHT VENTRICULAR END-DIASTOLIC DIMENSION: 2.65 CM
RV TISSUE DOPPLER FREE WALL SYSTOLIC VELOCITY 1 (APICAL 4 CHAMBER VIEW): 7.46 CM/S
SINUS: 2.84 CM
STJ: 2.9 CM
TDI LATERAL: 0.05 M/S
TDI SEPTAL: 0.04 M/S
TDI: 0.05 M/S
TR MAX PG: 31 MMHG
TRICUSPID ANNULAR PLANE SYSTOLIC EXCURSION: 1.8 CM
TV REST PULMONARY ARTERY PRESSURE: 34 MMHG

## 2022-05-30 ENCOUNTER — OFFICE VISIT (OUTPATIENT)
Dept: CARDIOLOGY | Facility: CLINIC | Age: 77
End: 2022-05-30
Attending: INTERNAL MEDICINE
Payer: MEDICARE

## 2022-05-30 VITALS
WEIGHT: 196.44 LBS | HEIGHT: 66 IN | SYSTOLIC BLOOD PRESSURE: 151 MMHG | BODY MASS INDEX: 31.57 KG/M2 | DIASTOLIC BLOOD PRESSURE: 68 MMHG | HEART RATE: 70 BPM

## 2022-05-30 DIAGNOSIS — I25.10 CORONARY ARTERY DISEASE INVOLVING NATIVE CORONARY ARTERY OF NATIVE HEART WITHOUT ANGINA PECTORIS: Chronic | ICD-10-CM

## 2022-05-30 DIAGNOSIS — Z95.5 S/P CORONARY ARTERY STENT PLACEMENT: Primary | Chronic | ICD-10-CM

## 2022-05-30 DIAGNOSIS — E11.9 TYPE 2 DIABETES MELLITUS WITHOUT COMPLICATION, WITHOUT LONG-TERM CURRENT USE OF INSULIN: ICD-10-CM

## 2022-05-30 DIAGNOSIS — Z78.9 STATIN INTOLERANCE: ICD-10-CM

## 2022-05-30 DIAGNOSIS — E78.5 DYSLIPIDEMIA: ICD-10-CM

## 2022-05-30 DIAGNOSIS — Z95.1 S/P CABG (CORONARY ARTERY BYPASS GRAFT): ICD-10-CM

## 2022-05-30 DIAGNOSIS — I47.10 SVT (SUPRAVENTRICULAR TACHYCARDIA): ICD-10-CM

## 2022-05-30 DIAGNOSIS — I10 ESSENTIAL HYPERTENSION: ICD-10-CM

## 2022-05-30 PROCEDURE — 99999 PR PBB SHADOW E&M-EST. PATIENT-LVL III: CPT | Mod: PBBFAC,,, | Performed by: INTERNAL MEDICINE

## 2022-05-30 PROCEDURE — 99999 PR PBB SHADOW E&M-EST. PATIENT-LVL III: ICD-10-PCS | Mod: PBBFAC,,, | Performed by: INTERNAL MEDICINE

## 2022-05-30 PROCEDURE — 99214 OFFICE O/P EST MOD 30 MIN: CPT | Mod: S$GLB,,, | Performed by: INTERNAL MEDICINE

## 2022-05-30 PROCEDURE — 99213 OFFICE O/P EST LOW 20 MIN: CPT | Mod: PBBFAC,PO | Performed by: INTERNAL MEDICINE

## 2022-05-30 PROCEDURE — 99214 PR OFFICE/OUTPT VISIT, EST, LEVL IV, 30-39 MIN: ICD-10-PCS | Mod: S$GLB,,, | Performed by: INTERNAL MEDICINE

## 2022-05-30 RX ORDER — VALSARTAN 40 MG/1
40 TABLET ORAL 2 TIMES DAILY
Qty: 180 TABLET | Refills: 3 | Status: SHIPPED | OUTPATIENT
Start: 2022-05-30 | End: 2022-06-01 | Stop reason: SDUPTHER

## 2022-05-30 NOTE — PROGRESS NOTES
Subjective:    Patient ID:  Mayra Pinedo is a 76 y.o. female who presents for follow-up of Coronary Artery Disease and Results      HPI  Here for follow up of CABG- PCI (NICOLE 10/20)/atrial tach s/p RFA (6/17)/mild AS. Remains active no angina.     Review of Systems   Constitutional: Negative for malaise/fatigue.   Eyes: Negative for blurred vision.   Cardiovascular: Negative for chest pain, claudication, cyanosis, dyspnea on exertion, irregular heartbeat, leg swelling, near-syncope, orthopnea, palpitations, paroxysmal nocturnal dyspnea and syncope.   Respiratory: Negative for cough and shortness of breath.    Hematologic/Lymphatic: Does not bruise/bleed easily.   Musculoskeletal: Negative for back pain, falls, joint pain, muscle cramps, muscle weakness and myalgias.   Gastrointestinal: Negative for abdominal pain, change in bowel habit, nausea and vomiting.   Genitourinary: Negative for urgency.   Neurological: Negative for dizziness, focal weakness and light-headedness.       Past Medical History:   Diagnosis Date    Anticoagulant long-term use     Plavix and ASA therapy    Arthritis     Asthma     seasonal, allergies    Blood in stool     Blood transfusion     Coronary artery disease     stent X3    Coronary artery disease involving native coronary artery of native heart without angina pectoris 6/1/2016    Diabetes mellitus     Dyslipidemia 6/1/2016    GERD (gastroesophageal reflux disease)     Hypertension     S/P CABG (coronary artery bypass graft) x 3 1991 6/1/2016    S/P CABG  x 3 1991 LIMA-LAD, VG-D (occluded), VG-RCA    S/P coronary artery stent placement 6/1/2016 7/2016 PDA- 2.25 x 24, synergy  8/15 lad/diag promus 2.5x16  8/2009 vg-rca- promus 3.5x8    Statin intolerance 11/30/2017    Failed prava/lipitor/crestor. Tolerates livalo    SVT (supraventricular tachycardia) 4/24/2017 6/2017 EPS 1.  Normal baseline intervals. 2.  No evidence of an accessory pathway. 3.  Atrial tachycardia  mapped to the posterior aspect near the IVC, status post radiofrequency ablation.     There are no hospital problems to display for this patient.       Objective:     Vitals:    05/30/22 1112   BP: (!) 151/68   Pulse: 70        Physical Exam  Constitutional:       Appearance: She is well-developed.   HENT:      Head: Normocephalic.   Eyes:      Conjunctiva/sclera: Conjunctivae normal.   Neck:      Vascular: No JVD.   Cardiovascular:      Rate and Rhythm: Normal rate and regular rhythm.      Pulses: Intact distal pulses.           Carotid pulses are 2+ on the right side and 2+ on the left side.       Radial pulses are 2+ on the right side and 2+ on the left side.        Dorsalis pedis pulses are 2+ on the right side and 2+ on the left side.        Posterior tibial pulses are 2+ on the right side and 2+ on the left side.      Heart sounds: Normal heart sounds.      Comments: Well healed midline sternal incision.    Pulmonary:      Effort: Pulmonary effort is normal.      Breath sounds: Normal breath sounds.   Abdominal:      General: Bowel sounds are normal.      Palpations: Abdomen is soft.   Musculoskeletal:         General: No tenderness.      Cervical back: Normal range of motion and neck supple.   Skin:     General: Skin is warm and dry.      Nails: There is no clubbing.   Neurological:      Mental Status: She is alert and oriented to person, place, and time.      Gait: Gait normal.   Psychiatric:         Speech: Speech normal.         Behavior: Behavior normal.         Thought Content: Thought content normal.               ..    Chemistry        Component Value Date/Time     05/19/2022 0746    K 4.7 05/19/2022 0746     05/19/2022 0746    CO2 21 (L) 05/19/2022 0746    BUN 16 05/19/2022 0746    CREATININE 1.5 (H) 05/19/2022 0746     (H) 05/19/2022 0746        Component Value Date/Time    CALCIUM 9.7 05/19/2022 0746    ALKPHOS 37 (L) 05/19/2022 0746    AST 26 05/19/2022 0746    ALT 13 05/19/2022  0746    BILITOT 0.4 05/19/2022 0746    ESTGFRAFRICA 39.3 (A) 05/19/2022 0746    EGFRNONAA 34.1 (A) 05/19/2022 0746            ..  Lab Results   Component Value Date    CHOL 101 (L) 05/19/2022    CHOL 99 (L) 08/11/2021    CHOL 91 (L) 01/05/2021     Lab Results   Component Value Date    HDL 26 (L) 05/19/2022    HDL 23 (L) 08/11/2021    HDL 30 (L) 01/05/2021     Lab Results   Component Value Date    LDLCALC 7.4 (L) 05/19/2022    LDLCALC 28.0 (L) 08/11/2021    LDLCALC 17.6 (L) 01/05/2021     Lab Results   Component Value Date    TRIG 338 (H) 05/19/2022    TRIG 240 (H) 08/11/2021    TRIG 217 (H) 01/05/2021     Lab Results   Component Value Date    CHOLHDL 25.7 05/19/2022    CHOLHDL 23.2 08/11/2021    CHOLHDL 33.0 01/05/2021     ..  Lab Results   Component Value Date    WBC 5.32 05/19/2022    HGB 10.9 (L) 05/19/2022    HCT 33.3 (L) 05/19/2022    MCV 93 05/19/2022     05/19/2022       Test(s) Reviewed  I have reviewed the following in detail:  [] Stress test   [] Angiography   [x] Echocardiogram   [x] Labs   [x] Other:       Assessment:         ICD-10-CM ICD-9-CM   1. S/P coronary artery stent placement  Z95.5 V45.82   2. Statin intolerance  Z78.9 995.27   3. Type 2 diabetes mellitus without complication, without long-term current use of insulin  E11.9 250.00   4. S/P CABG (coronary artery bypass graft) x 3 1991  Z95.1 V45.81   5. Dyslipidemia  E78.5 272.4   6. SVT (supraventricular tachycardia)  I47.1 427.89   7. Essential hypertension  I10 401.9   8. Coronary artery disease involving native coronary artery of native heart without angina pectoris  I25.10 414.01     Active Problem List with Overview Notes    Diagnosis Date Noted    Personal history of colonic polyps 04/14/2022    Coronary artery disease involving native coronary artery of native heart 10/01/2020    Lumbar spondylosis 06/25/2020    Lumbar radiculopathy 05/19/2020    Statin intolerance 11/30/2017     Failed prava/lipitor/crestor. Tolerates  livalo      Severe obesity (BMI 35.0-39.9) with comorbidity 08/08/2017    SVT (supraventricular tachycardia) 04/24/2017 6/2017 EPS  1.  Normal baseline intervals.  2.  No evidence of an accessory pathway.  3.  Atrial tachycardia mapped to the posterior aspect near the IVC, status post radiofrequency ablation.      S/P coronary artery stent placement 06/01/2016 7/2016 PDA- 2.25 x 24, synergy    8/15 lad/diag promus 2.5x16    8/2009 vg-rca- promus 3.5x8      Coronary artery disease involving native coronary artery of native heart without angina pectoris 06/01/2016    Essential hypertension 06/01/2016    Dyslipidemia 06/01/2016    Type 2 diabetes mellitus without complication 06/01/2016    Aortic sclerosis 06/01/2016    S/P CABG (coronary artery bypass graft) x 3 1991 06/01/2016     S/P CABG  x 3 1991 LIMA-LAD, VG-D (occluded), VG-RCA          Plan:           Return to clinic 3 months   Low level/low impact aerobic exercise 5x's/wk. Heart healthy diet and risk factor modification.    See labs and med orders.  C/o severe constipation -hold fibrate x 1 week if improves then reduce back to 54 mg add welchol.     Portions of this note may have been created with voice recognition software.  Grammatical, syntax and spelling errors may be inevitable.

## 2022-06-01 ENCOUNTER — TELEPHONE (OUTPATIENT)
Dept: GASTROENTEROLOGY | Facility: CLINIC | Age: 77
End: 2022-06-01
Payer: MEDICARE

## 2022-06-01 NOTE — TELEPHONE ENCOUNTER
"----- Message from Jun Thompson MD sent at 5/31/2022  7:34 PM CDT -----  Take 20 mg then  ----- Message -----  From: Farzad Clements LPN  Sent: 5/31/2022  12:04 PM CDT  To: Jun Thompson MD    Please verify valsartan order. States pt blacks out if she takes more than 40 mg a day  ----- Message -----  From: Gladys Vick  Sent: 5/31/2022   8:20 AM CDT  To: Donna TA Staff    "Type:  Patient Call Back    Who Called:Sarah (Cresencio)    What is the reqeust in detail:Requesting call back in regards to medication valsartan (DIOVAN) 40 MG tablet. Please advise    Can the clinic reply by [facility]?no    Best Call Back Number:[phone]      Additional Information:Question about the directions                "

## 2022-06-02 RX ORDER — VALSARTAN 40 MG/1
20 TABLET ORAL 2 TIMES DAILY
Qty: 90 TABLET | Refills: 3 | Status: SHIPPED | OUTPATIENT
Start: 2022-06-02 | End: 2022-08-29 | Stop reason: SDUPTHER

## 2022-08-11 ENCOUNTER — TELEPHONE (OUTPATIENT)
Dept: OBSTETRICS AND GYNECOLOGY | Facility: CLINIC | Age: 77
End: 2022-08-11
Payer: MEDICARE

## 2022-08-11 NOTE — TELEPHONE ENCOUNTER
Spoke with patient notified that yes with her insurance it will be every 2 years for an exam with Dr Alva unless she is having issues or needs a medication refilled that he prescribes her. She verbalized understanding and states she will see us next year.

## 2022-08-11 NOTE — TELEPHONE ENCOUNTER
----- Message from Wanda Jacques sent at 8/11/2022  2:54 PM CDT -----  Contact: pt at [phone]  Type: Needs Medical Advice  Who Called:  pt    Best Call Back Number: [phone]    Additional Information: pt is calling the office stating she received a letter that she is due for a visit. She states if her insurance will cover the visit she would like to schedule. Please call back and advise.

## 2022-08-19 RX ORDER — PITAVASTATIN CALCIUM 4.18 MG/1
TABLET, FILM COATED ORAL
Qty: 30 TABLET | Refills: 4 | Status: SHIPPED | OUTPATIENT
Start: 2022-08-19 | End: 2023-08-28 | Stop reason: SDUPTHER

## 2022-10-12 ENCOUNTER — PATIENT MESSAGE (OUTPATIENT)
Dept: OBSTETRICS AND GYNECOLOGY | Facility: CLINIC | Age: 77
End: 2022-10-12
Payer: MEDICARE

## 2022-10-12 ENCOUNTER — HOSPITAL ENCOUNTER (OUTPATIENT)
Dept: RADIOLOGY | Facility: HOSPITAL | Age: 77
Discharge: HOME | End: 2022-10-12
Attending: OBSTETRICS & GYNECOLOGY
Payer: MEDICARE

## 2022-10-12 DIAGNOSIS — Z12.31 ENCOUNTER FOR SCREENING MAMMOGRAM FOR MALIGNANT NEOPLASM OF BREAST: ICD-10-CM

## 2022-10-12 PROCEDURE — 77063 MAMMO DIGITAL SCREENING BILAT WITH TOMO: ICD-10-PCS | Mod: 26,,, | Performed by: RADIOLOGY

## 2022-10-12 PROCEDURE — 77067 MAMMO DIGITAL SCREENING BILAT WITH TOMO: ICD-10-PCS | Mod: 26,,, | Performed by: RADIOLOGY

## 2022-10-12 PROCEDURE — 77063 BREAST TOMOSYNTHESIS BI: CPT | Mod: TC,PN

## 2022-10-12 PROCEDURE — 77067 SCR MAMMO BI INCL CAD: CPT | Mod: 26,,, | Performed by: RADIOLOGY

## 2022-10-12 PROCEDURE — 77067 SCR MAMMO BI INCL CAD: CPT | Mod: TC,PN

## 2022-10-12 PROCEDURE — 77063 BREAST TOMOSYNTHESIS BI: CPT | Mod: 26,,, | Performed by: RADIOLOGY

## 2023-10-26 NOTE — ANESTHESIA POSTPROCEDURE EVALUATION
Anesthesia Post Evaluation    Patient: Mayra Pinedo    Procedure(s) Performed: Procedure(s) (LRB):  COLONOSCOPY (N/A)    Final Anesthesia Type: general      Patient location during evaluation: PACU  Patient participation: Yes- Able to Participate  Level of consciousness: awake and alert  Post-procedure vital signs: reviewed and stable  Pain management: adequate  Airway patency: patent    PONV status at discharge: No PONV  Anesthetic complications: no      Cardiovascular status: hemodynamically stable  Respiratory status: unassisted and room air  Hydration status: euvolemic  Follow-up not needed.          Vitals Value Taken Time   /60 04/14/22 1235   Temp 36.2 °C (97.2 °F) 04/14/22 1225   Pulse 70 04/14/22 1249   Resp 16 04/14/22 1249   SpO2 97 % 04/14/22 1249         Event Time   Out of Recovery 13:04:23         Pain/Cristela Score: Cristela Score: 10 (4/14/2022 12:50 PM)         Dr. Burk - office aware

## 2024-01-03 ENCOUNTER — OFFICE VISIT (OUTPATIENT)
Dept: OBSTETRICS AND GYNECOLOGY | Facility: CLINIC | Age: 79
End: 2024-01-03
Payer: MEDICARE

## 2024-01-03 ENCOUNTER — HOSPITAL ENCOUNTER (OUTPATIENT)
Dept: RADIOLOGY | Facility: HOSPITAL | Age: 79
Discharge: HOME | End: 2024-01-03
Attending: OBSTETRICS & GYNECOLOGY
Payer: MEDICARE

## 2024-01-03 VITALS
BODY MASS INDEX: 31.22 KG/M2 | SYSTOLIC BLOOD PRESSURE: 140 MMHG | WEIGHT: 194.25 LBS | HEIGHT: 66 IN | DIASTOLIC BLOOD PRESSURE: 52 MMHG

## 2024-01-03 DIAGNOSIS — Z12.31 VISIT FOR SCREENING MAMMOGRAM: ICD-10-CM

## 2024-01-03 DIAGNOSIS — Z12.31 VISIT FOR SCREENING MAMMOGRAM: Primary | ICD-10-CM

## 2024-01-03 PROCEDURE — 77067 SCR MAMMO BI INCL CAD: CPT | Mod: 26,,, | Performed by: RADIOLOGY

## 2024-01-03 PROCEDURE — 77063 BREAST TOMOSYNTHESIS BI: CPT | Mod: 26,,, | Performed by: RADIOLOGY

## 2024-01-03 PROCEDURE — 99999 PR PBB SHADOW E&M-EST. PATIENT-LVL IV: CPT | Mod: PBBFAC,,, | Performed by: OBSTETRICS & GYNECOLOGY

## 2024-01-03 PROCEDURE — 77067 SCR MAMMO BI INCL CAD: CPT | Mod: TC,PN

## 2024-01-03 PROCEDURE — G0101 CA SCREEN;PELVIC/BREAST EXAM: HCPCS | Mod: S$GLB,,, | Performed by: OBSTETRICS & GYNECOLOGY

## 2024-01-03 NOTE — PROGRESS NOTES
Chief Complaint   Patient presents with    Well Woman    Mammogram       History and Physical:  No LMP recorded. Patient has had a hysterectomy.       Mayra Pinedo is a 78 y.o.  female who presents today for her routine annual GYN exam. The patient has no Gynecology complaints today. No bowel or bladder complaints. , no Vaginal Bleeding      Allergies:   Review of patient's allergies indicates:   Allergen Reactions    Oxycodone-acetaminophen Hives    Digitalis glycosides Other (See Comments)     Severe joint pain    Digoxin Other (See Comments)     Numbness and painful to move    Statins-hmg-coa reductase inhibitors      Myalgias, alpoecia    Erythromycin Other (See Comments)     Pt reports tachycardia and andres skin to neck and face    Latex, natural rubber Nausea Only and Rash     Nausea at dentist    Penicillins Rash    Sulfa (sulfonamide antibiotics) Rash       Past Medical History:   Diagnosis Date    Anticoagulant long-term use     Plavix and ASA therapy    Arthritis     Asthma     seasonal, allergies    Blood in stool     Blood transfusion     Coronary artery disease     stent X3    Coronary artery disease involving native coronary artery of native heart without angina pectoris 06/01/2016    Diabetes mellitus     Dyslipidemia 06/01/2016    GERD (gastroesophageal reflux disease)     Hypertension     Malignant neoplasm of kidney     s/p nephrectomy    S/P CABG (coronary artery bypass graft) x 3 1991 06/01/2016    S/P CABG  x 3 1991 LIMA-LAD, VG-D (occluded), VG-RCA    S/P coronary artery stent placement 06/01/2016 7/2016 PDA- 2.25 x 24, synergy  8/15 lad/diag promus 2.5x16  8/2009 vg-rca- promus 3.5x8    Statin intolerance 11/30/2017    Failed prava/lipitor/crestor. Tolerates livalo    SVT (supraventricular tachycardia) 04/24/2017 6/2017 EPS 1.  Normal baseline intervals. 2.  No evidence of an accessory pathway. 3.  Atrial tachycardia mapped to the posterior aspect near the IVC, status post  radiofrequency ablation.       Past Surgical History:   Procedure Laterality Date    ACHILLES TENDON SURGERY Left     torn tendon    CARDIAC SURGERY      x 4--22 years ago    cataract surgery      CHOLECYSTECTOMY      cholescystectomy      COLONOSCOPY  7/20/2006  [facility]    One 1 to 2 mm polyp in the proximal descending colon.  HYPERPLASTIC POLYP.    Internal hemorrhoids.    COLONOSCOPY N/A 4/14/2022    Procedure: COLONOSCOPY;  Surgeon: Amaury Ornelas Jr., MD;  Location: [facility] ENDO;  Service: Endoscopy;  Laterality: N/A;    CORONARY ANGIOGRAPHY N/A 10/1/2020    Procedure: ANGIOGRAM, CORONARY ARTERY;  Surgeon: Jun Thompson MD;  Location: [facility] CATH;  Service: Cardiology;  Laterality: N/A;    CORONARY ANGIOGRAPHY INCLUDING BYPASS GRAFTS WITH CATHETERIZATION OF LEFT HEART  2/9/2023    Procedure: ANGIOGRAM, CORONARY, INCLUDING BYPASS GRAFT, WITH LEFT HEART CATHETERIZATION;  Surgeon: Jun Thompson MD;  Location: [facility] CATH;  Service: Cardiology;;    CORONARY BYPASS GRAFT ANGIOGRAPHY  10/1/2020    Procedure: Bypass graft study;  Surgeon: Jun Thompson MD;  Location: [facility] CATH;  Service: Cardiology;;    CORONARY STENT PLACEMENT      2009 X 1; 2014 X 1    EPIDURAL STEROID INJECTION INTO LUMBAR SPINE N/A 5/19/2020    Procedure: Injection-steroid-epidural-lumbar L5/S1;  Surgeon: Mannie Montoya MD;  Location: [facility] OR;  Service: Pain Management;  Laterality: N/A;    EPIDURAL STEROID INJECTION INTO LUMBAR SPINE N/A 8/27/2020    Procedure: Injection-steroid-epidural-lumbar, L3/4;  Surgeon: Mannie Montoya MD;  Location: [facility] OR;  Service: Pain Management;  Laterality: N/A;    HYSTERECTOMY      INJECTION OF ANESTHETIC AGENT AROUND MEDIAL BRANCH NERVES INNERVATING LUMBAR FACET JOINT Bilateral 6/25/2020    Procedure: Block-nerve-medial branch-lumbar L3/4, L4/5, L5/S1;  Surgeon: Mannie Montoya MD;  Location: [facility] OR;  Service: Pain Management;  Laterality: Bilateral;    PERCUTANEOUS CORONARY INTERVENTION, ARTERY   2023    Procedure: Percutaneous coronary intervention;  Surgeon: Jun Thompson MD;  Location: [facility];  Service: Cardiology;;    Right kidney removed Right 2021       MEDS:   Current Outpatient Medications on File Prior to Visit   Medication Sig Dispense Refill    ALBUTEROL SULFATE (VENTOLIN HFA INHL) Inhale 2 Inhalers into the lungs as needed.       aspirin (ECOTRIN) 81 MG EC tablet Take 81 mg by mouth once daily.      carvediloL (COREG) 25 MG tablet Take 1 tablet (25 mg total) by mouth 2 (two) times daily. 180 tablet 4    cetirizine (ZYRTEC) 10 MG tablet Take 1 tablet by mouth every evening.       clopidogreL (PLAVIX) 75 mg tablet Take 1 tablet (75 mg total) by mouth once daily. 90 tablet 4    diclofenac sodium 1 % Gel 4 g.      ezetimibe (ZETIA) 10 mg tablet Take 1 tablet (10 mg total) by mouth once daily. 90 tablet 4    famotidine (PEPCID) 20 MG tablet Take 20 mg by mouth 2 (two) times daily.      fenofibrate 160 MG Tab Take 1 tablet (160 mg total) by mouth once daily. 90 tablet 4    hydrALAZINE (APRESOLINE) 25 MG tablet Take 1 tablet (25 mg total) by mouth 2 (two) times daily. 90 tablet 4    isosorbide mononitrate (IMDUR) 60 MG 24 hr tablet Take 1 tablet (60 mg total) by mouth every evening. 90 tablet 4    levothyroxine (SYNTHROID) 50 MCG tablet Take 50 mcg by mouth every morning.      nitroGLYCERIN (NITROSTAT) 0.4 MG SL tablet Place 1 tablet (0.4 mg total) under the tongue every 5 (five) minutes as needed for Chest pain. 25 tablet 4    omega-3 fatty acids-vitamin E 1,000 mg Cap Take 2,000 mg by mouth 2 (two) times daily.      pitavastatin calcium (LIVALO) 4 mg Tab Take 1 tablet by mouth once daily. 30 tablet 4    valsartan (DIOVAN) 40 MG tablet Take 1 tablet (40 mg total) by mouth 2 (two) times daily. 90 tablet 3     No current facility-administered medications on file prior to visit.       OB History          6    Para   6    Term   3            AB        Living             SAB      "   IAB        Ectopic        Multiple        Live Births                     Social History     Socioeconomic History    Marital status:    Tobacco Use    Smoking status: Never    Smokeless tobacco: Never   Substance and Sexual Activity    Alcohol use: Not Currently     Comment: occasional    Drug use: No    Sexual activity: Yes     Partners: Male     Birth control/protection: Surgical     Comment: hysterectomy        Family History   Problem Relation Age of Onset    Hypertension Sister     Heart disease Brother     Hypertension Brother     Coronary artery disease Brother     Obesity Brother     Hypertension Maternal Grandmother     Heart disease Maternal Grandmother     Heart attack Maternal Grandmother     Hypertension Maternal Grandfather     Heart disease Maternal Grandfather     Breast cancer Neg Hx     Ovarian cancer Neg Hx          Past medical and surgical history reviewed.   I have reviewed the patient's medical history in detail and updated the computerized patient record.        Review of System:   General: no chills, fever, night sweats, weight gain or weight loss  Psychological: no depression or suicidal ideation  Breasts: no new or changing breast lumps, nipple discharge or masses.  Respiratory: no cough, shortness of breath, or wheezing  Cardiovascular: no chest pain or dyspnea on exertion  Gastrointestinal: no abdominal pain, change in bowel habits, or black or bloody stools  Genito-Urinary: no incontinence, urinary frequency/urgency, pelvic pain or abnormal vaginal bleeding.  Musculoskeletal: no gait disturbance or muscular weakness      Physical Exam:   BP (!) 140/52   Ht 5' 6" (1.676 m)   Wt 88.1 kg (194 lb 3.6 oz)   BMI 31.35 kg/m²   Constitutional: She is oriented to person, place, and time. She appears well-developed and well-nourished. No distress.   HENT:   Head: Normocephalic and atraumatic.   Eyes: Conjunctivae and EOM are normal. No scleral icterus.   Neck: Normal range of " motion. Neck supple. No tracheal deviation present.   Cardiovascular: Normal rate.    Pulmonary/Chest: Effort normal. No respiratory distress. She exhibits no tenderness.  Breasts: are symmetrical.   Right breast exhibits no inverted nipple, no mass, no nipple discharge, no skin change and no tenderness.   Left breast exhibits no inverted nipple, no mass, no nipple discharge, no skin change and no tenderness.  Abdominal: Soft. She exhibits no distension and no mass. There is no tenderness. There is no rebound and no guarding.   Genitourinary:    External rectal exam shows no thrombosed external hemorrhoids.    Pelvic exam was performed with patient supine.   No labial fusion.   There is no rash, lesion or injury on the right labia.   There is no rash, lesion or injury on the left labia.   No bleeding and no signs of injury around the vaginal introitus, urethra is without lesions and well supported.    No vaginal discharge found. Wetprep; no clue cells or trichomonads. Budding yeast confirmed with KOH, whiff negative   No significant Cystocele, Enterocele or rectocele, and cuff well supported.   Bimanual exam:   The urethra and vagina are without palpable masses or tenderness.   Uterus and cervix are surgically absents, vaginal cuff is intact and well supported.   Right adnexum displays no mass and no tenderness.   Left adnexum displays no mass and no tenderness.  Musculoskeletal: Normal range of motion.   Lymphadenopathy: No inguinal adenopathy present.   Neurological: She is alert and oriented to person, place, and time. Coordination normal.   Skin: Skin is warm and dry. She is not diaphoretic.   Psychiatric: She has a normal mood and affect.        Assessment:   Normal annual GYN exam  H/o abn PAP/ normal cuff pap 2 years ago    Plan:   PAP not needed   Mammogram  Prn  estrogen cream  Follow up in 1 year.

## 2024-01-05 ENCOUNTER — PATIENT MESSAGE (OUTPATIENT)
Dept: OBSTETRICS AND GYNECOLOGY | Facility: CLINIC | Age: 79
End: 2024-01-05
Payer: MEDICARE

## 2025-01-27 ENCOUNTER — TELEPHONE (OUTPATIENT)
Dept: OBSTETRICS AND GYNECOLOGY | Facility: CLINIC | Age: 80
End: 2025-01-27
Payer: MEDICARE

## 2025-01-27 NOTE — TELEPHONE ENCOUNTER
----- Message from Margie sent at 1/27/2025  9:14 AM CST -----  Contact: Mayra  Type:  Mammogram    Caller is requesting to schedule their annual mammogram appointment.  Order is not listed in EPIC.  Please enter order and contact patient to schedule.  Name of Caller:Mayra  Where would they like the mammogram performed?'s office  Would the patient rather a call back or a response via [facility]? Call back  Best Call Back Number:[phone]  Additional Information: She ask to do her mammogram on the same day of her WWE on Feb/12/2025  Thanks!

## 2025-02-12 ENCOUNTER — HOSPITAL ENCOUNTER (OUTPATIENT)
Dept: RADIOLOGY | Facility: HOSPITAL | Age: 80
Discharge: HOME | End: 2025-02-12
Attending: OBSTETRICS & GYNECOLOGY
Payer: MEDICARE

## 2025-02-12 ENCOUNTER — PATIENT MESSAGE (OUTPATIENT)
Dept: OBSTETRICS AND GYNECOLOGY | Facility: CLINIC | Age: 80
End: 2025-02-12

## 2025-02-12 ENCOUNTER — OFFICE VISIT (OUTPATIENT)
Dept: OBSTETRICS AND GYNECOLOGY | Facility: CLINIC | Age: 80
End: 2025-02-12
Payer: MEDICARE

## 2025-02-12 VITALS
BODY MASS INDEX: 31.21 KG/M2 | SYSTOLIC BLOOD PRESSURE: 126 MMHG | DIASTOLIC BLOOD PRESSURE: 88 MMHG | WEIGHT: 193.31 LBS

## 2025-02-12 DIAGNOSIS — Z12.31 VISIT FOR SCREENING MAMMOGRAM: ICD-10-CM

## 2025-02-12 DIAGNOSIS — Z01.419 WOMEN'S ANNUAL ROUTINE GYNECOLOGICAL EXAMINATION: ICD-10-CM

## 2025-02-12 DIAGNOSIS — Z12.31 VISIT FOR SCREENING MAMMOGRAM: Primary | ICD-10-CM

## 2025-02-12 PROCEDURE — 77063 BREAST TOMOSYNTHESIS BI: CPT | Mod: 26,,, | Performed by: RADIOLOGY

## 2025-02-12 PROCEDURE — 99999 PR PBB SHADOW E&M-EST. PATIENT-LVL III: CPT | Mod: PBBFAC,,, | Performed by: OBSTETRICS & GYNECOLOGY

## 2025-02-12 PROCEDURE — 77067 SCR MAMMO BI INCL CAD: CPT | Mod: 26,,, | Performed by: RADIOLOGY

## 2025-02-12 PROCEDURE — 77063 BREAST TOMOSYNTHESIS BI: CPT | Mod: TC,PN

## 2025-02-12 NOTE — PROGRESS NOTES
Chief Complaint   Patient presents with    Well Woman       History and Physical:  No LMP recorded. Patient has had a hysterectomy.       Mayra Pinedo is a 79 y.o.  female who presents today for her routine annual GYN exam. The patient has no Gynecology complaints today. No bowel or bladder complaints.       Allergies:   Review of patient's allergies indicates:   Allergen Reactions    Oxycodone-acetaminophen Hives    Digitalis glycosides Other (See Comments)     Severe joint pain    Digoxin Other (See Comments)     Numbness and painful to move    Statins-hmg-coa reductase inhibitors      Myalgias, alpoecia    Erythromycin Other (See Comments)     Pt reports tachycardia and andres skin to neck and face    Latex, natural rubber Nausea Only and Rash     Nausea at dentist    Penicillins Rash    Sulfa (sulfonamide antibiotics) Rash       Past Medical History:   Diagnosis Date    Anticoagulant long-term use     Plavix and ASA therapy    Arthritis     Asthma     seasonal, allergies    Blood in stool     Blood transfusion     Coronary artery disease     stent X3    Coronary artery disease involving native coronary artery of native heart without angina pectoris 06/01/2016    Diabetes mellitus     Dyslipidemia 06/01/2016    GERD (gastroesophageal reflux disease)     Hypertension     Malignant neoplasm of kidney     s/p nephrectomy    S/P CABG (coronary artery bypass graft) x 3 1991 06/01/2016    S/P CABG  x 3 1991 LIMA-LAD, VG-D (occluded), VG-RCA    S/P coronary artery stent placement 06/01/2016 7/2016 PDA- 2.25 x 24, synergy  8/15 lad/diag promus 2.5x16  8/2009 vg-rca- promus 3.5x8    Statin intolerance 11/30/2017    Failed prava/lipitor/crestor. Tolerates livalo    SVT (supraventricular tachycardia) 04/24/2017 6/2017 EPS 1.  Normal baseline intervals. 2.  No evidence of an accessory pathway. 3.  Atrial tachycardia mapped to the posterior aspect near the IVC, status post radiofrequency ablation.        Past Surgical History:   Procedure Laterality Date    ACHILLES TENDON SURGERY Left     torn tendon    CARDIAC SURGERY      x 4--22 years ago    cataract surgery      CHOLECYSTECTOMY      cholescystectomy      COLONOSCOPY  7/20/2006  [facility]    One 1 to 2 mm polyp in the proximal descending colon.  HYPERPLASTIC POLYP.    Internal hemorrhoids.    COLONOSCOPY N/A 4/14/2022    Procedure: COLONOSCOPY;  Surgeon: Amaury Ornelas Jr., MD;  Location: [facility] ENDO;  Service: Endoscopy;  Laterality: N/A;    CORONARY ANGIOGRAPHY N/A 10/1/2020    Procedure: ANGIOGRAM, CORONARY ARTERY;  Surgeon: Jun Thompson MD;  Location: [facility] CATH;  Service: Cardiology;  Laterality: N/A;    CORONARY ANGIOGRAPHY INCLUDING BYPASS GRAFTS WITH CATHETERIZATION OF LEFT HEART  2/9/2023    Procedure: ANGIOGRAM, CORONARY, INCLUDING BYPASS GRAFT, WITH LEFT HEART CATHETERIZATION;  Surgeon: Jun Thompson MD;  Location: [facility] CATH;  Service: Cardiology;;    CORONARY BYPASS GRAFT ANGIOGRAPHY  10/1/2020    Procedure: Bypass graft study;  Surgeon: Jun Thompson MD;  Location: [facility] CATH;  Service: Cardiology;;    CORONARY STENT PLACEMENT      2009 X 1; 2014 X 1    EPIDURAL STEROID INJECTION INTO LUMBAR SPINE N/A 5/19/2020    Procedure: Injection-steroid-epidural-lumbar L5/S1;  Surgeon: Mannie Montoya MD;  Location: [facility] OR;  Service: Pain Management;  Laterality: N/A;    EPIDURAL STEROID INJECTION INTO LUMBAR SPINE N/A 8/27/2020    Procedure: Injection-steroid-epidural-lumbar, L3/4;  Surgeon: Mannie Montoya MD;  Location: [facility] OR;  Service: Pain Management;  Laterality: N/A;    HYSTERECTOMY      INJECTION OF ANESTHETIC AGENT AROUND MEDIAL BRANCH NERVES INNERVATING LUMBAR FACET JOINT Bilateral 6/25/2020    Procedure: Block-nerve-medial branch-lumbar L3/4, L4/5, L5/S1;  Surgeon: Mannie Montoya MD;  Location: [facility] OR;  Service: Pain Management;  Laterality: Bilateral;    PERCUTANEOUS CORONARY INTERVENTION, ARTERY  2/9/2023    Procedure:  Percutaneous coronary intervention;  Surgeon: Jun Thompson MD;  Location: [facility];  Service: Cardiology;;    Right kidney removed Right 2021       MEDS:   Current Outpatient Medications on File Prior to Visit   Medication Sig Dispense Refill    ALBUTEROL SULFATE (VENTOLIN HFA INHL) Inhale 2 Inhalers into the lungs as needed.       aspirin (ECOTRIN) 81 MG EC tablet Take 81 mg by mouth once daily.      carvediloL (COREG) 25 MG tablet Take 1 tablet (25 mg total) by mouth 2 (two) times daily. 180 tablet 4    cetirizine (ZYRTEC) 10 MG tablet Take 1 tablet by mouth every evening.       clopidogreL (PLAVIX) 75 mg tablet Take 1 tablet (75 mg total) by mouth once daily. 90 tablet 4    diclofenac sodium 1 % Gel 4 g.      ezetimibe (ZETIA) 10 mg tablet Take 1 tablet (10 mg total) by mouth once daily. 90 tablet 4    famotidine (PEPCID) 20 MG tablet Take 20 mg by mouth 2 (two) times daily.      fenofibrate 160 MG Tab Take 1 tablet (160 mg total) by mouth once daily. 90 tablet 4    hydrALAZINE (APRESOLINE) 25 MG tablet Take 1 tablet by mouth twice daily 90 tablet 4    isosorbide mononitrate (IMDUR) 60 MG 24 hr tablet Take 1 tablet (60 mg total) by mouth every evening. 90 tablet 4    levothyroxine (SYNTHROID) 50 MCG tablet Take 50 mcg by mouth every morning.      nitroGLYCERIN (NITROSTAT) 0.4 MG SL tablet Place 1 tablet (0.4 mg total) under the tongue every 5 (five) minutes as needed for Chest pain. 25 tablet 4    omega-3 fatty acids-vitamin E 1,000 mg Cap Take 2,000 mg by mouth 2 (two) times daily.      pitavastatin calcium (LIVALO) 4 mg Tab Take 1 tablet by mouth once daily. 30 tablet 4    [DISCONTINUED] valsartan (DIOVAN) 40 MG tablet Take 1 tablet (40 mg total) by mouth 2 (two) times daily. (Patient not taking: Reported on 3/11/2024) 90 tablet 3     No current facility-administered medications on file prior to visit.       OB History          6    Para   6    Term   3            AB        Living              SAB        IAB        Ectopic        Multiple        Live Births                     Social History     Socioeconomic History    Marital status:    Tobacco Use    Smoking status: Never    Smokeless tobacco: Never   Substance and Sexual Activity    Alcohol use: Not Currently     Comment: occasional    Drug use: No    Sexual activity: Yes     Partners: Male     Birth control/protection: Surgical     Comment: hysterectomy      Social Drivers of Health     Financial Resource Strain: Medium Risk (2/11/2025)    Overall Financial Resource Strain (CARDIA)     Difficulty of Paying Living Expenses: Somewhat hard   Food Insecurity: No Food Insecurity (2/11/2025)    Hunger Vital Sign     Worried About Running Out of Food in the Last Year: Never true     Ran Out of Food in the Last Year: Never true   Transportation Needs: No Transportation Needs (8/15/2018)    [facility]    PRAPARE - Transportation     Lack of Transportation (Medical): No     Lack of Transportation (Non-Medical): No   Physical Activity: Inactive (2/11/2025)    Exercise Vital Sign     Days of Exercise per Week: 0 days     Minutes of Exercise per Session: 0 min   Stress: Stress Concern Present (2/11/2025)    [employer] - Occupational Stress Questionnaire     Feeling of Stress : To some extent   Housing Stability: Unknown (2/11/2025)    Housing Stability Vital Sign     Unable to Pay for Housing in the Last Year: No       Family History   Problem Relation Name Age of Onset    Hypertension Sister      Heart disease Brother      Hypertension Brother      Coronary artery disease Brother      Obesity Brother      Hypertension Maternal Grandmother      Heart disease Maternal Grandmother      Heart attack Maternal Grandmother      Hypertension Maternal Grandfather      Heart disease Maternal Grandfather      Breast cancer Neg Hx      Ovarian cancer Neg Hx           Past medical  and surgical history reviewed.   I have reviewed the patient's medical history in detail and updated the computerized patient record.    Review of System:   General: no chills, fever, night sweats, weight gain or weight loss  Psychological: no depression or suicidal ideation  Breasts: no new or changing breast lumps, nipple discharge or masses.  Respiratory: no cough, shortness of breath, or wheezing  Cardiovascular: no chest pain or dyspnea on exertion  Gastrointestinal: no abdominal pain, change in bowel habits, or black or bloody stools  Genito-Urinary: no incontinence, urinary frequency/urgency or vulvar/vaginal symptoms, pelvic pain or abnormal vaginal bleeding.  Musculoskeletal: no gait disturbance or muscular weakness      Physical Exam:   /88 (Patient Position: Sitting)   Wt 87.7 kg (193 lb 5.5 oz)   BMI 31.21 kg/m²   Constitutional: She is oriented to person, place, and time. She appears well-developed and well-nourished. No distress. OverWeight  HENT:   Head: Normocephalic and atraumatic.   Eyes: Conjunctivae and EOM are normal. No scleral icterus.   Neck: Normal range of motion. Neck supple. No tracheal deviation present.   Cardiovascular: Normal rate.    Pulmonary/Chest: Effort normal. No respiratory distress. She exhibits no tenderness.  Breasts: are symmetrical.   Right breast exhibits no inverted nipple, no mass, no nipple discharge, no skin change and no tenderness.   Left breast exhibits no inverted nipple, no mass, no nipple discharge, no skin change and no tenderness.  Abdominal: Soft. She exhibits no distension and no mass. There is no tenderness. There is no rebound and no guarding.   Genitourinary:    External rectal exam shows no thrombosed external hemorrhoids.    Pelvic exam was performed with patient supine.   No labial fusion.   There is no rash, lesion or injury on the right labia.   There is no rash, lesion or injury on the left labia.   No bleeding and no signs of injury around  the vaginal introitus, urethra is without lesions and well supported.    No vaginal discharge found.   No significant Cystocele, Enterocele or rectocele, and cuff well supported.   Bimanual exam:   The urethra and vagina are without palpable masses or tenderness.   Uterus and cervix are surgically absents, vaginal cuff is intact and well supported.   Right adnexum displays no mass and no tenderness.   Left adnexum displays no mass and no tenderness.  Musculoskeletal: Normal range of motion.   Neurological: She is alert and oriented to person, place, and time. Coordination normal.   Skin: Skin is warm and dry. She is not diaphoretic.   Psychiatric: She has a normal mood and affect.        Assessment:   Normal annual GYN exam      Plan:   PAP Not Needed  Mammogram today  Follow up in 1 year.

## 2025-02-18 ENCOUNTER — OFFICE VISIT (OUTPATIENT)
Dept: ORTHOPEDICS | Facility: CLINIC | Age: 80
End: 2025-02-18
Payer: MEDICARE

## 2025-02-18 VITALS — WEIGHT: 193.31 LBS | BODY MASS INDEX: 31.07 KG/M2 | HEIGHT: 66 IN

## 2025-02-18 DIAGNOSIS — M70.61 GREATER TROCHANTERIC BURSITIS OF RIGHT HIP: Primary | ICD-10-CM

## 2025-02-18 PROCEDURE — 99204 OFFICE O/P NEW MOD 45 MIN: CPT | Mod: 25,S$GLB,, | Performed by: ORTHOPAEDIC SURGERY

## 2025-02-18 PROCEDURE — 1160F RVW MEDS BY RX/DR IN RCRD: CPT | Mod: CPTII,S$GLB,, | Performed by: ORTHOPAEDIC SURGERY

## 2025-02-18 PROCEDURE — 1159F MED LIST DOCD IN RCRD: CPT | Mod: CPTII,S$GLB,, | Performed by: ORTHOPAEDIC SURGERY

## 2025-02-18 PROCEDURE — 99999 PR PBB SHADOW E&M-EST. PATIENT-LVL III: CPT | Mod: PBBFAC,,, | Performed by: ORTHOPAEDIC SURGERY

## 2025-02-18 PROCEDURE — 3288F FALL RISK ASSESSMENT DOCD: CPT | Mod: CPTII,S$GLB,, | Performed by: ORTHOPAEDIC SURGERY

## 2025-02-18 PROCEDURE — 20610 DRAIN/INJ JOINT/BURSA W/O US: CPT | Mod: RT,S$GLB,, | Performed by: ORTHOPAEDIC SURGERY

## 2025-02-18 PROCEDURE — 1101F PT FALLS ASSESS-DOCD LE1/YR: CPT | Mod: CPTII,S$GLB,, | Performed by: ORTHOPAEDIC SURGERY

## 2025-02-18 PROCEDURE — 1125F AMNT PAIN NOTED PAIN PRSNT: CPT | Mod: CPTII,S$GLB,, | Performed by: ORTHOPAEDIC SURGERY

## 2025-02-18 RX ORDER — METHOCARBAMOL 750 MG/1
750 TABLET, FILM COATED ORAL 4 TIMES DAILY PRN
Qty: 44 TABLET | Refills: 0 | Status: SHIPPED | OUTPATIENT
Start: 2025-02-18

## 2025-02-18 RX ADMIN — TRIAMCINOLONE ACETONIDE 40 MG: 40 INJECTION, SUSPENSION INTRA-ARTICULAR; INTRAMUSCULAR at 10:02

## 2025-02-25 RX ORDER — TRIAMCINOLONE ACETONIDE 40 MG/ML
40 INJECTION, SUSPENSION INTRA-ARTICULAR; INTRAMUSCULAR
Status: DISCONTINUED | OUTPATIENT
Start: 2025-02-18 | End: 2025-02-25 | Stop reason: HOSPADM

## 2025-02-25 NOTE — PROGRESS NOTES
Chief Complaint   Patient presents with    Right Hip - Pain    Left Hip - Pain      HPI:   This is a 79 y.o. who presents to clinic today for right hip pain for the past 2 months after no known trauma. Complains of pain while sleeping on side and when descending stairs. Pain is dull. No numbness or tingling. No associated signs or symptoms.      Past Medical History:   Diagnosis Date    Anticoagulant long-term use     Plavix and ASA therapy    Arthritis     Asthma     seasonal, allergies    Blood in stool     Blood transfusion     Coronary artery disease     stent X3    Coronary artery disease involving native coronary artery of native heart without angina pectoris 06/01/2016    Diabetes mellitus     Dyslipidemia 06/01/2016    GERD (gastroesophageal reflux disease)     Hypertension     Malignant neoplasm of kidney     s/p nephrectomy    S/P CABG (coronary artery bypass graft) x 3 1991 06/01/2016    S/P CABG  x 3 1991 LIMA-LAD, VG-D (occluded), VG-RCA    S/P coronary artery stent placement 06/01/2016 7/2016 PDA- 2.25 x 24, synergy  8/15 lad/diag promus 2.5x16  8/2009 vg-rca- promus 3.5x8    Statin intolerance 11/30/2017    Failed prava/lipitor/crestor. Tolerates livalo    SVT (supraventricular tachycardia) 04/24/2017 6/2017 EPS 1.  Normal baseline intervals. 2.  No evidence of an accessory pathway. 3.  Atrial tachycardia mapped to the posterior aspect near the IVC, status post radiofrequency ablation.     Past Surgical History:   Procedure Laterality Date    ACHILLES TENDON SURGERY Left     torn tendon    CARDIAC SURGERY      x 4--22 years ago    cataract surgery      CHOLECYSTECTOMY      cholescystectomy      COLONOSCOPY  7/20/2006  [facility]    One 1 to 2 mm polyp in the proximal descending colon.  HYPERPLASTIC POLYP.    Internal hemorrhoids.    COLONOSCOPY N/A 4/14/2022    Procedure: COLONOSCOPY;  Surgeon: Amaury Ornelas Jr., MD;  Location: [facility];  Service: Endoscopy;  Laterality: N/A;    CORONARY  ANGIOGRAPHY N/A 10/1/2020    Procedure: ANGIOGRAM, CORONARY ARTERY;  Surgeon: Jun Thompson MD;  Location: [facility] CATH;  Service: Cardiology;  Laterality: N/A;    CORONARY ANGIOGRAPHY INCLUDING BYPASS GRAFTS WITH CATHETERIZATION OF LEFT HEART  2/9/2023    Procedure: ANGIOGRAM, CORONARY, INCLUDING BYPASS GRAFT, WITH LEFT HEART CATHETERIZATION;  Surgeon: Jun Thompson MD;  Location: [facility] CATH;  Service: Cardiology;;    CORONARY BYPASS GRAFT ANGIOGRAPHY  10/1/2020    Procedure: Bypass graft study;  Surgeon: Jun Thompson MD;  [facility];  Service: Cardiology;;    CORONARY STENT PLACEMENT      2009 X 1; 2014 X 1    EPIDURAL STEROID INJECTION INTO LUMBAR SPINE N/A 5/19/2020    Procedure: Injection-steroid-epidural-lumbar L5/S1;  Surgeon: Mannie Montoya MD;  Location: [facility] OR;  Service: Pain Management;  Laterality: N/A;    EPIDURAL STEROID INJECTION INTO LUMBAR SPINE N/A 8/27/2020    Procedure: Injection-steroid-epidural-lumbar, L3/4;  Surgeon: Mannie Montoya MD;  Location: [facility] OR;  Service: Pain Management;  Laterality: N/A;    HYSTERECTOMY      INJECTION OF ANESTHETIC AGENT AROUND MEDIAL BRANCH NERVES INNERVATING LUMBAR FACET JOINT Bilateral 6/25/2020    Procedure: Block-nerve-medial branch-lumbar L3/4, L4/5, L5/S1;  Surgeon: Mannie Monotya MD;  Location: [facility] OR;  Service: Pain Management;  Laterality: Bilateral;    PERCUTANEOUS CORONARY INTERVENTION, ARTERY  2/9/2023    Procedure: Percutaneous coronary intervention;  Surgeon: Jun Thompson MD;  Location: [facility] CATH;  Service: Cardiology;;    Right kidney removed Right 05/05/2021     Medications Ordered Prior to Encounter[1]  Review of patient's allergies indicates:   Allergen Reactions    Oxycodone-acetaminophen Hives    Digitalis glycosides Other (See Comments)     Severe joint pain    Digoxin Other (See Comments)     Numbness and painful to move    Statins-hmg-coa reductase inhibitors      Myalgias, alpoecia    Erythromycin Other (See  Comments)     Pt reports tachycardia and andres skin to neck and face    Latex, natural rubber Nausea Only and Rash     Nausea at dentist    Penicillins Rash    Sulfa (sulfonamide antibiotics) Rash     Family History   Problem Relation Name Age of Onset    Hypertension Sister      Heart disease Brother      Hypertension Brother      Coronary artery disease Brother      Obesity Brother      Hypertension Maternal Grandmother      Heart disease Maternal Grandmother      Heart attack Maternal Grandmother      Hypertension Maternal Grandfather      Heart disease Maternal Grandfather      Breast cancer Neg Hx      Ovarian cancer Neg Hx       Social History[2]    Review of Systems:  Constitutional:  Denies fever or chills   Eyes:  Denies change in visual acuity   HENT:  Denies nasal congestion or sore throat   Respiratory:  Denies cough or shortness of breath   Cardiovascular:  Denies chest pain or edema   GI:  Denies abdominal pain, nausea, vomiting, bloody stools or diarrhea   :  Denies dysuria   Integument:  Denies rash   Neurologic:  Denies headache, focal weakness or sensory changes   Endocrine:  Denies polyuria or polydipsia   Lymphatic:  Denies swollen glands   Psychiatric:  Denies depression or anxiety     Physical Exam:   Constitutional:  Well developed, well nourished, no acute distress, non-toxic appearance   Integument:  Well hydrated, no rash   Lymphatic:  No lymphadenopathy noted   Neurologic:  Alert & oriented x 3  Psychiatric:  Speech and behavior appropriate     Bilateral Hip Exam    left Hip Exam   left hip exam performed same as contralateral hip and is normal.     right Hip Exam   Tenderness   The patient is experiencing tenderness in the greater trochanter.  Range of Motion   The patient has normal hip ROM.  Muscle Strength   Abduction: 4/5   Other   Erythema: absent  Sensation: normal  Pulse: present    X-rays were personally reviewed by me and findings discussed with the patient.  2 views of the  right hip show no fractures or dislocations    Greater trochanteric bursitis of right hip    Other orders  -     methocarbamoL (ROBAXIN) 750 MG Tab; Take 1 tablet (750 mg total) by mouth 4 (four) times daily as needed.  Dispense: 44 tablet; Refill: 0           Using an aseptic technique, I injected 5 cc of lidocaine 1% without and 1 cc of kenalog 40mg into the right Hip. The patient tolerated this well. I will have them return to clinic in 6 weeks.             [1]   Current Outpatient Medications on File Prior to Visit   Medication Sig Dispense Refill    ALBUTEROL SULFATE (VENTOLIN HFA INHL) Inhale 2 Inhalers into the lungs as needed.       aspirin (ECOTRIN) 81 MG EC tablet Take 81 mg by mouth once daily.      cetirizine (ZYRTEC) 10 MG tablet Take 1 tablet by mouth every evening.       diclofenac sodium 1 % Gel 4 g.      famotidine (PEPCID) 20 MG tablet Take 20 mg by mouth 2 (two) times daily.      levothyroxine (SYNTHROID) 50 MCG tablet Take 50 mcg by mouth every morning.      omega-3 fatty acids-vitamin E 1,000 mg Cap Take 2,000 mg by mouth 2 (two) times daily.      [DISCONTINUED] carvediloL (COREG) 25 MG tablet Take 1 tablet (25 mg total) by mouth 2 (two) times daily. 180 tablet 4    [DISCONTINUED] clopidogreL (PLAVIX) 75 mg tablet Take 1 tablet (75 mg total) by mouth once daily. 90 tablet 4    [DISCONTINUED] ezetimibe (ZETIA) 10 mg tablet Take 1 tablet (10 mg total) by mouth once daily. 90 tablet 4    [DISCONTINUED] fenofibrate 160 MG Tab Take 1 tablet (160 mg total) by mouth once daily. 90 tablet 4    [DISCONTINUED] hydrALAZINE (APRESOLINE) 25 MG tablet Take 1 tablet by mouth twice daily 90 tablet 4    [DISCONTINUED] isosorbide mononitrate (IMDUR) 60 MG 24 hr tablet Take 1 tablet (60 mg total) by mouth every evening. 90 tablet 4    [DISCONTINUED] nitroGLYCERIN (NITROSTAT) 0.4 MG SL tablet Place 1 tablet (0.4 mg total) under the tongue every 5 (five) minutes as needed for Chest pain. 25 tablet 4     [DISCONTINUED] pitavastatin calcium (LIVALO) 4 mg Tab Take 1 tablet by mouth once daily. 30 tablet 4     No current facility-administered medications on file prior to visit.   [2]   Social History  Socioeconomic History    Marital status:    Tobacco Use    Smoking status: Never    Smokeless tobacco: Never   Substance and Sexual Activity    Alcohol use: Not Currently     Comment: occasional    Drug use: No    Sexual activity: Yes     Partners: Male     Birth control/protection: Surgical     Comment: hysterectomy      Social Drivers of Health     Financial Resource Strain: Low Risk  (2/24/2025)    Overall Financial Resource Strain (CARDIA)     Difficulty of Paying Living Expenses: Not very hard   Recent Concern: Financial Resource Strain - Medium Risk (2/11/2025)    Overall Financial Resource Strain (CARDIA)     Difficulty of Paying Living Expenses: Somewhat hard   Food Insecurity: Food Insecurity Present (2/24/2025)    Hunger Vital Sign     Worried About Running Out of Food in the Last Year: Never true     Ran Out of Food in the Last Year: Sometimes true   Transportation Needs: No Transportation Needs (2/24/2025)    PRAPARE - Transportation     Lack of Transportation (Medical): No     Lack of Transportation (Non-Medical): No   Physical Activity: Unknown (2/24/2025)    Exercise Vital Sign     Days of Exercise per Week: Patient declined     Minutes of Exercise per Session: 0 min   Recent Concern: Physical Activity - Inactive (2/11/2025)    Exercise Vital Sign     Days of Exercise per Week: 0 days     Minutes of Exercise per Session: 0 min   Stress: No Stress Concern Present (2/24/2025)    [employer] - Occupational Stress Questionnaire     Feeling of Stress : Only a little   Recent Concern: Stress - Stress Concern Present (2/11/2025)    [employer] - Occupational Stress Questionnaire     Feeling of Stress : To some extent   Housing Stability: Low Risk   (2/24/2025)    Housing Stability Vital Sign     Unable to Pay for Housing in the Last Year: No     Number of Times Moved in the Last Year: 0     Homeless in the Last Year: No

## 2025-02-25 NOTE — PROCEDURES
Large Joint Aspiration/Injection: R greater trochanteric bursa    Date/Time: 2/18/2025 10:15 AM    Performed by: Michael Garcia MD  Authorized by: Michael Garcia MD    Consent Done?:  Yes (Verbal)  Indications:  Pain  Timeout: prior to procedure the correct patient, procedure, and site was verified    Prep: patient was prepped and draped in usual sterile fashion      Local anesthesia used?: Yes    Local anesthetic:  Lidocaine 1% without epinephrine  Anesthetic total (ml):  5      Details:  Needle Size:  22 G  Approach:  Lateral  Location:  Hip  Site:  R greater trochanteric bursa  Medications:  40 mg triamcinolone acetonide 40 mg/mL  Patient tolerance:  Patient tolerated the procedure well with no immediate complications

## 2025-04-03 ENCOUNTER — OFFICE VISIT (OUTPATIENT)
Dept: ORTHOPEDICS | Facility: CLINIC | Age: 80
End: 2025-04-03
Payer: MEDICARE

## 2025-04-03 VITALS — HEIGHT: 66 IN | WEIGHT: 195.56 LBS | BODY MASS INDEX: 31.43 KG/M2

## 2025-04-03 DIAGNOSIS — M75.42 IMPINGEMENT SYNDROME OF LEFT SHOULDER: Primary | ICD-10-CM

## 2025-04-03 PROCEDURE — 99999 PR PBB SHADOW E&M-EST. PATIENT-LVL IV: CPT | Mod: PBBFAC,,, | Performed by: ORTHOPAEDIC SURGERY

## 2025-04-03 RX ADMIN — TRIAMCINOLONE ACETONIDE 40 MG: 40 INJECTION, SUSPENSION INTRA-ARTICULAR; INTRAMUSCULAR at 01:04

## 2025-04-07 ENCOUNTER — PATIENT MESSAGE (OUTPATIENT)
Dept: ORTHOPEDICS | Facility: CLINIC | Age: 80
End: 2025-04-07
Payer: MEDICARE

## 2025-04-08 RX ORDER — TRIAMCINOLONE ACETONIDE 40 MG/ML
40 INJECTION, SUSPENSION INTRA-ARTICULAR; INTRAMUSCULAR
Status: DISCONTINUED | OUTPATIENT
Start: 2025-04-03 | End: 2025-04-08 | Stop reason: HOSPADM

## 2025-04-08 NOTE — PROCEDURES
Large Joint Aspiration/Injection: L subacromial bursa    Date/Time: 4/3/2025 1:45 PM    Performed by: Michael Garcia MD  Authorized by: Michael Garcia MD    Consent Done?:  Yes (Verbal)  Indications:  Pain  Timeout: prior to procedure the correct patient, procedure, and site was verified    Prep: patient was prepped and draped in usual sterile fashion      Local anesthesia used?: Yes    Local anesthetic:  Lidocaine 1% without epinephrine  Anesthetic total (ml):  5      Details:  Needle Size:  21 G  Ultrasonic Guidance for needle placement?: No    Approach:  Posterior  Location:  Shoulder  Site:  L subacromial bursa  Medications:  40 mg triamcinolone acetonide 40 mg/mL  Patient tolerance:  Patient tolerated the procedure well with no immediate complications

## 2025-04-25 PROBLEM — I48.0 PAROXYSMAL ATRIAL FIBRILLATION: Status: ACTIVE | Noted: 2025-04-25

## 2025-05-12 ENCOUNTER — PATIENT MESSAGE (OUTPATIENT)
Dept: ORTHOPEDICS | Facility: CLINIC | Age: 80
End: 2025-05-12
Payer: MEDICARE

## 2025-06-15 ENCOUNTER — PATIENT MESSAGE (OUTPATIENT)
Dept: ORTHOPEDICS | Facility: CLINIC | Age: 80
End: 2025-06-15
Payer: MEDICARE

## 2025-07-07 ENCOUNTER — LAB VISIT (OUTPATIENT)
Dept: LAB | Facility: HOSPITAL | Age: 80
End: 2025-07-07
Attending: NURSE PRACTITIONER
Payer: MEDICARE

## 2025-07-07 DIAGNOSIS — R60.0 LOWER EXTREMITY EDEMA: ICD-10-CM

## 2025-07-07 DIAGNOSIS — I48.0 PAROXYSMAL ATRIAL FIBRILLATION: ICD-10-CM

## 2025-07-07 LAB
T4 FREE SERPL-MCNC: 1.2 NG/DL (ref 0.71–1.51)
TSH SERPL-ACNC: 7.76 UIU/ML (ref 0.4–4)

## 2025-07-07 PROCEDURE — 84439 ASSAY OF FREE THYROXINE: CPT

## 2025-07-07 PROCEDURE — 84443 ASSAY THYROID STIM HORMONE: CPT

## 2025-07-07 PROCEDURE — 36415 COLL VENOUS BLD VENIPUNCTURE: CPT | Mod: PO

## (undated) DEVICE — APPLICATOR CHLORAPREP CLR 10.5

## (undated) DEVICE — SYR GLASS 5CC LUER LOK

## (undated) DEVICE — NDL TUOHY EPIDURAL 20G X 3.5

## (undated) DEVICE — TRAY NERVE BLOCK

## (undated) DEVICE — MARKER SKIN STND TIP BLUE BARR

## (undated) DEVICE — GLOVE SURGICAL LATEX SZ 7

## (undated) DEVICE — NDL 18GA X1 1/2 REG BEVEL

## (undated) DEVICE — NDL SPINAL SPINOCAN 22GX3.5

## (undated) DEVICE — SEE MEDLINE ITEM 152622